# Patient Record
Sex: FEMALE | Race: BLACK OR AFRICAN AMERICAN | NOT HISPANIC OR LATINO | Employment: UNEMPLOYED | URBAN - METROPOLITAN AREA
[De-identification: names, ages, dates, MRNs, and addresses within clinical notes are randomized per-mention and may not be internally consistent; named-entity substitution may affect disease eponyms.]

---

## 2024-03-03 ENCOUNTER — CLINICAL SUPPORT (OUTPATIENT)
Dept: EMERGENCY MEDICINE | Facility: HOSPITAL | Age: 64
End: 2024-03-03
Payer: COMMERCIAL

## 2024-03-03 ENCOUNTER — HOSPITAL ENCOUNTER (OUTPATIENT)
Facility: HOSPITAL | Age: 64
Setting detail: OBSERVATION
Discharge: HOME | End: 2024-03-05
Attending: EMERGENCY MEDICINE | Admitting: PHYSICIAN ASSISTANT
Payer: COMMERCIAL

## 2024-03-03 ENCOUNTER — APPOINTMENT (OUTPATIENT)
Dept: RADIOLOGY | Facility: HOSPITAL | Age: 64
End: 2024-03-03
Payer: COMMERCIAL

## 2024-03-03 DIAGNOSIS — R06.02 SHORTNESS OF BREATH: Primary | ICD-10-CM

## 2024-03-03 DIAGNOSIS — R06.09 OTHER FORMS OF DYSPNEA: ICD-10-CM

## 2024-03-03 DIAGNOSIS — E87.79 OTHER HYPERVOLEMIA: ICD-10-CM

## 2024-03-03 PROBLEM — E87.70 FLUID EXCESS: Status: ACTIVE | Noted: 2024-03-03

## 2024-03-03 LAB
ABO GROUP (TYPE) IN BLOOD: NORMAL
ALBUMIN SERPL BCP-MCNC: 4.1 G/DL (ref 3.4–5)
ALP SERPL-CCNC: 116 U/L (ref 33–136)
ALT SERPL W P-5'-P-CCNC: 28 U/L (ref 7–45)
ANION GAP BLDV CALCULATED.4IONS-SCNC: 11 MMOL/L (ref 10–25)
ANION GAP SERPL CALC-SCNC: 14 MMOL/L (ref 10–20)
ANTIBODY SCREEN: NORMAL
AST SERPL W P-5'-P-CCNC: 16 U/L (ref 9–39)
ATRIAL RATE: 103 BPM
BASE EXCESS BLDV CALC-SCNC: 0.9 MMOL/L (ref -2–3)
BASOPHILS # BLD AUTO: 0.01 X10*3/UL (ref 0–0.1)
BASOPHILS NFR BLD AUTO: 0.1 %
BILIRUB SERPL-MCNC: 0.3 MG/DL (ref 0–1.2)
BNP SERPL-MCNC: 507 PG/ML (ref 0–99)
BODY TEMPERATURE: 37 DEGREES CELSIUS
BUN SERPL-MCNC: 16 MG/DL (ref 6–23)
CA-I BLDV-SCNC: 1.22 MMOL/L (ref 1.1–1.33)
CALCIUM SERPL-MCNC: 9.6 MG/DL (ref 8.6–10.6)
CARDIAC TROPONIN I PNL SERPL HS: 10 NG/L (ref 0–34)
CARDIAC TROPONIN I PNL SERPL HS: 10 NG/L (ref 0–34)
CHLORIDE BLDV-SCNC: 103 MMOL/L (ref 98–107)
CHLORIDE SERPL-SCNC: 104 MMOL/L (ref 98–107)
CO2 SERPL-SCNC: 25 MMOL/L (ref 21–32)
CREAT SERPL-MCNC: 0.95 MG/DL (ref 0.5–1.05)
D DIMER PPP FEU-MCNC: 421 NG/ML FEU
EGFRCR SERPLBLD CKD-EPI 2021: 68 ML/MIN/1.73M*2
EOSINOPHIL # BLD AUTO: 0 X10*3/UL (ref 0–0.7)
EOSINOPHIL NFR BLD AUTO: 0 %
ERYTHROCYTE [DISTWIDTH] IN BLOOD BY AUTOMATED COUNT: 15.3 % (ref 11.5–14.5)
FLUAV RNA RESP QL NAA+PROBE: NOT DETECTED
FLUBV RNA RESP QL NAA+PROBE: NOT DETECTED
GLUCOSE BLD MANUAL STRIP-MCNC: 166 MG/DL (ref 74–99)
GLUCOSE BLD MANUAL STRIP-MCNC: 223 MG/DL (ref 74–99)
GLUCOSE BLDV-MCNC: 284 MG/DL (ref 74–99)
GLUCOSE SERPL-MCNC: 259 MG/DL (ref 74–99)
HCO3 BLDV-SCNC: 26.6 MMOL/L (ref 22–26)
HCT VFR BLD AUTO: 35.8 % (ref 36–46)
HCT VFR BLD EST: 36 % (ref 36–46)
HGB BLD-MCNC: 11.8 G/DL (ref 12–16)
HGB BLDV-MCNC: 12.1 G/DL (ref 12–16)
IMM GRANULOCYTES # BLD AUTO: 0.1 X10*3/UL (ref 0–0.7)
IMM GRANULOCYTES NFR BLD AUTO: 1.3 % (ref 0–0.9)
INHALED O2 CONCENTRATION: 21 %
LACTATE BLDV-SCNC: 2 MMOL/L (ref 0.4–2)
LYMPHOCYTES # BLD AUTO: 1.11 X10*3/UL (ref 1.2–4.8)
LYMPHOCYTES NFR BLD AUTO: 14.4 %
MAGNESIUM SERPL-MCNC: 1.71 MG/DL (ref 1.6–2.4)
MCH RBC QN AUTO: 24.7 PG (ref 26–34)
MCHC RBC AUTO-ENTMCNC: 33 G/DL (ref 32–36)
MCV RBC AUTO: 75 FL (ref 80–100)
MONOCYTES # BLD AUTO: 0.26 X10*3/UL (ref 0.1–1)
MONOCYTES NFR BLD AUTO: 3.4 %
NEUTROPHILS # BLD AUTO: 6.22 X10*3/UL (ref 1.2–7.7)
NEUTROPHILS NFR BLD AUTO: 80.8 %
NRBC BLD-RTO: 0 /100 WBCS (ref 0–0)
OXYHGB MFR BLDV: 59.8 % (ref 45–75)
P AXIS: 77 DEGREES
P OFFSET: 196 MS
P ONSET: 139 MS
PCO2 BLDV: 46 MM HG (ref 41–51)
PH BLDV: 7.37 PH (ref 7.33–7.43)
PHOSPHATE SERPL-MCNC: 3.2 MG/DL (ref 2.5–4.9)
PLATELET # BLD AUTO: 317 X10*3/UL (ref 150–450)
PO2 BLDV: 42 MM HG (ref 35–45)
POTASSIUM BLDV-SCNC: 3.8 MMOL/L (ref 3.5–5.3)
POTASSIUM SERPL-SCNC: 3.7 MMOL/L (ref 3.5–5.3)
PR INTERVAL: 170 MS
PROT SERPL-MCNC: 7.2 G/DL (ref 6.4–8.2)
Q ONSET: 224 MS
QRS COUNT: 17 BEATS
QRS DURATION: 86 MS
QT INTERVAL: 360 MS
QTC CALCULATION(BAZETT): 471 MS
QTC FREDERICIA: 431 MS
R AXIS: 50 DEGREES
RBC # BLD AUTO: 4.78 X10*6/UL (ref 4–5.2)
RH FACTOR (ANTIGEN D): NORMAL
SAO2 % BLDV: 61 % (ref 45–75)
SARS-COV-2 RNA RESP QL NAA+PROBE: NOT DETECTED
SODIUM BLDV-SCNC: 137 MMOL/L (ref 136–145)
SODIUM SERPL-SCNC: 139 MMOL/L (ref 136–145)
T AXIS: 165 DEGREES
T OFFSET: 404 MS
TSH SERPL-ACNC: 0.6 MIU/L (ref 0.44–3.98)
VENTRICULAR RATE: 103 BPM
WBC # BLD AUTO: 7.7 X10*3/UL (ref 4.4–11.3)

## 2024-03-03 PROCEDURE — 99285 EMERGENCY DEPT VISIT HI MDM: CPT | Performed by: EMERGENCY MEDICINE

## 2024-03-03 PROCEDURE — 93010 ELECTROCARDIOGRAM REPORT: CPT | Performed by: EMERGENCY MEDICINE

## 2024-03-03 PROCEDURE — 84132 ASSAY OF SERUM POTASSIUM: CPT

## 2024-03-03 PROCEDURE — 84100 ASSAY OF PHOSPHORUS: CPT

## 2024-03-03 PROCEDURE — 93005 ELECTROCARDIOGRAM TRACING: CPT

## 2024-03-03 PROCEDURE — 84443 ASSAY THYROID STIM HORMONE: CPT

## 2024-03-03 PROCEDURE — 99285 EMERGENCY DEPT VISIT HI MDM: CPT | Mod: 25

## 2024-03-03 PROCEDURE — 71045 X-RAY EXAM CHEST 1 VIEW: CPT

## 2024-03-03 PROCEDURE — 2500000002 HC RX 250 W HCPCS SELF ADMINISTERED DRUGS (ALT 637 FOR MEDICARE OP, ALT 636 FOR OP/ED)

## 2024-03-03 PROCEDURE — 2500000001 HC RX 250 WO HCPCS SELF ADMINISTERED DRUGS (ALT 637 FOR MEDICARE OP)

## 2024-03-03 PROCEDURE — G0378 HOSPITAL OBSERVATION PER HR: HCPCS

## 2024-03-03 PROCEDURE — 86850 RBC ANTIBODY SCREEN: CPT

## 2024-03-03 PROCEDURE — 84484 ASSAY OF TROPONIN QUANT: CPT

## 2024-03-03 PROCEDURE — 2500000002 HC RX 250 W HCPCS SELF ADMINISTERED DRUGS (ALT 637 FOR MEDICARE OP, ALT 636 FOR OP/ED): Performed by: PHYSICIAN ASSISTANT

## 2024-03-03 PROCEDURE — 87636 SARSCOV2 & INF A&B AMP PRB: CPT

## 2024-03-03 PROCEDURE — 85025 COMPLETE CBC W/AUTO DIFF WBC: CPT

## 2024-03-03 PROCEDURE — 71045 X-RAY EXAM CHEST 1 VIEW: CPT | Performed by: RADIOLOGY

## 2024-03-03 PROCEDURE — 85379 FIBRIN DEGRADATION QUANT: CPT

## 2024-03-03 PROCEDURE — 83880 ASSAY OF NATRIURETIC PEPTIDE: CPT

## 2024-03-03 PROCEDURE — 36415 COLL VENOUS BLD VENIPUNCTURE: CPT

## 2024-03-03 PROCEDURE — 82947 ASSAY GLUCOSE BLOOD QUANT: CPT

## 2024-03-03 PROCEDURE — 83735 ASSAY OF MAGNESIUM: CPT

## 2024-03-03 RX ORDER — DEXTROSE MONOHYDRATE 100 MG/ML
0.3 INJECTION, SOLUTION INTRAVENOUS ONCE AS NEEDED
Status: DISCONTINUED | OUTPATIENT
Start: 2024-03-03 | End: 2024-03-05 | Stop reason: HOSPADM

## 2024-03-03 RX ORDER — METOPROLOL TARTRATE 50 MG/1
100 TABLET ORAL DAILY
Status: DISCONTINUED | OUTPATIENT
Start: 2024-03-03 | End: 2024-03-03

## 2024-03-03 RX ORDER — ZOLPIDEM TARTRATE 5 MG/1
10 TABLET ORAL ONCE
Status: COMPLETED | OUTPATIENT
Start: 2024-03-03 | End: 2024-03-03

## 2024-03-03 RX ORDER — HYDROCHLOROTHIAZIDE 25 MG/1
12.5 TABLET ORAL DAILY
Status: DISCONTINUED | OUTPATIENT
Start: 2024-03-03 | End: 2024-03-05 | Stop reason: HOSPADM

## 2024-03-03 RX ORDER — METOPROLOL TARTRATE 50 MG/1
50 TABLET ORAL ONCE
Status: COMPLETED | OUTPATIENT
Start: 2024-03-03 | End: 2024-03-03

## 2024-03-03 RX ORDER — INSULIN LISPRO 100 [IU]/ML
0-10 INJECTION, SOLUTION INTRAVENOUS; SUBCUTANEOUS
Status: DISCONTINUED | OUTPATIENT
Start: 2024-03-03 | End: 2024-03-05 | Stop reason: HOSPADM

## 2024-03-03 RX ORDER — DEXTROSE 50 % IN WATER (D50W) INTRAVENOUS SYRINGE
25
Status: DISCONTINUED | OUTPATIENT
Start: 2024-03-03 | End: 2024-03-05 | Stop reason: HOSPADM

## 2024-03-03 RX ADMIN — ZOLPIDEM TARTRATE 10 MG: 5 TABLET ORAL at 22:37

## 2024-03-03 RX ADMIN — METOPROLOL TARTRATE 50 MG: 50 TABLET, FILM COATED ORAL at 13:41

## 2024-03-03 RX ADMIN — HYDROCHLOROTHIAZIDE 12.5 MG: 25 TABLET ORAL at 13:38

## 2024-03-03 RX ADMIN — INSULIN LISPRO 4 UNITS: 100 INJECTION, SOLUTION INTRAVENOUS; SUBCUTANEOUS at 18:15

## 2024-03-03 ASSESSMENT — PAIN DESCRIPTION - LOCATION: LOCATION: BACK

## 2024-03-03 ASSESSMENT — LIFESTYLE VARIABLES
EVER HAD A DRINK FIRST THING IN THE MORNING TO STEADY YOUR NERVES TO GET RID OF A HANGOVER: NO
EVER FELT BAD OR GUILTY ABOUT YOUR DRINKING: NO
HAVE PEOPLE ANNOYED YOU BY CRITICIZING YOUR DRINKING: NO
HAVE YOU EVER FELT YOU SHOULD CUT DOWN ON YOUR DRINKING: NO

## 2024-03-03 ASSESSMENT — PAIN SCALES - GENERAL: PAINLEVEL_OUTOF10: 7

## 2024-03-03 ASSESSMENT — COLUMBIA-SUICIDE SEVERITY RATING SCALE - C-SSRS
1. IN THE PAST MONTH, HAVE YOU WISHED YOU WERE DEAD OR WISHED YOU COULD GO TO SLEEP AND NOT WAKE UP?: NO
6. HAVE YOU EVER DONE ANYTHING, STARTED TO DO ANYTHING, OR PREPARED TO DO ANYTHING TO END YOUR LIFE?: NO
2. HAVE YOU ACTUALLY HAD ANY THOUGHTS OF KILLING YOURSELF?: NO

## 2024-03-03 ASSESSMENT — PAIN - FUNCTIONAL ASSESSMENT: PAIN_FUNCTIONAL_ASSESSMENT: 0-10

## 2024-03-03 NOTE — DISCHARGE INSTRUCTIONS
Your tests were concerning for heart failure.  Please monitor and control your salt intake.  You should expect a call in the next couple days to get scheduled to see a heart doctor (cardiology).  If you do not hear by this Thursday, please call the following number to get scheduled: [phone]

## 2024-03-03 NOTE — ED PROVIDER NOTES
HPI:      Limitations to History: none  Additional History Obtained from: None   External records reviewed: Prior EMR notes    Chief Complaint   Patient presents with    Shortness of Breath          Ela Shankar is a 63 y.o. female with past medical history of HTN, T2DM, HLD presenting to the ED with shortness of breath over the last week.  Over the last days she has become more short of breath particularly on exertion, noting that she is having difficulty moving around, and is unable to lie flat at night.  Notes that she typically takes her blood pressure medications, however today she woke up, found herself very short of breath, and presented to the emergency department prior to taking her blood pressure medication.  On arrival was 195/98, given metoprolol which he takes at home.  She also notes mild chest pain, but is more concerned about her shortness of breath.  She denies any fevers, chills, nausea, vomiting at home.    Past Medical History:   Diagnosis Date    Diabetes mellitus (CMS/[facility])     Hyperlipidemia     Hypertension      History reviewed. No pertinent surgical history.  Social History     Tobacco Use    Smoking status: Former     Types: Cigarettes    Smokeless tobacco: Never     Allergies   Allergen Reactions    Lisinopril Cough   --------------------------------------------------------------------------------------------------------------------------------------    VS: As documented in the triage note and EMR flowsheet from this visit were reviewed.  Temp 36.6 °C (97.9 °F) HR (!) 105 BP (!) 195/98 RR (!) 22 Sat 97 % on      Physical Exam:  GEN:  no acute distress, appears comfortable. Conversational and appropriate.    HEENT: Normocephalic, atraumatic. Conjunctiva pink with no redness or exudates. Hearing grossly intact. Moist mucous membranes.  CARDIO: Tachycardic rate and regular rhythm. Normal S1, S2  without murmurs, rubs, or gallops.   PULM: Clear to auscultation bilaterally however tachypneic.  no rales, rhonchi, or wheezes. No accessory muscle use or stridor. Speaking in full sentences.  GI: Soft, non-tender, non-distended. No rebound tenderness or guarding.   SKIN: Warm and dry, no rashes, lesions, petechiae, or purpura.  MSK: ROM intact in all 4 extremities without contractures or pain. No peripheral edema, contusions, or wounds.    NEURO: A&Ox3, No focal findings identified. No confusion or gross mental status changes.  PSYCH: Appropriate mood and behavior, converses and responds appropriately during exam.        ---------------------------------------------------------------------------------------------------------------------------------------  Given in the ED:   Medications   metoprolol tartrate (Lopressor) tablet 50 mg (50 mg oral Given 3/3/24 1341)   zolpidem (Ambien) tablet 10 mg (10 mg oral Given 3/3/24 2237)   perflutren lipid microspheres (Definity) injection 1 mL of dilution (4 mL of dilution intravenous Given 3/4/24 1224)        Work up: All labs and imaging were independently reviewed by me.    Labs Reviewed   CBC WITH AUTO DIFFERENTIAL - Abnormal       Result Value    WBC 7.7      nRBC 0.0      RBC 4.78      Hemoglobin 11.8 (*)     Hematocrit 35.8 (*)     MCV 75 (*)     MCH 24.7 (*)     MCHC 33.0      RDW 15.3 (*)     Platelets 317      Neutrophils % 80.8      Immature Granulocytes %, Automated 1.3 (*)     Lymphocytes % 14.4      Monocytes % 3.4      Eosinophils % 0.0      Basophils % 0.1      Neutrophils Absolute 6.22      Immature Granulocytes Absolute, Automated 0.10      Lymphocytes Absolute 1.11 (*)     Monocytes Absolute 0.26      Eosinophils Absolute 0.00      Basophils Absolute 0.01     COMPREHENSIVE METABOLIC PANEL - Abnormal    Glucose 259 (*)     Sodium 139      Potassium 3.7      Chloride 104      Bicarbonate 25      Anion Gap 14      Urea Nitrogen 16      Creatinine 0.95      eGFR 68      Calcium 9.6      Albumin 4.1      Alkaline Phosphatase 116      Total Protein 7.2       AST 16      Bilirubin, Total 0.3      ALT 28     B-TYPE NATRIURETIC PEPTIDE - Abnormal     (*)     Narrative:        <100 pg/mL - Heart failure unlikely  100-299 pg/mL - Intermediate probability of acute heart                  failure exacerbation. Correlate with clinical                  context and patient history.    >=300 pg/mL - Heart Failure likely. Correlate with clinical                  context and patient history.     Biotin interference may cause falsely decreased results. Patients taking a Biotin dose of up to 5 mg/day should refrain from taking Biotin for 24 hours before sample  collection. Providers may contact their local laboratory for further information.   BLOOD GAS VENOUS FULL PANEL - Abnormal    POCT pH, Venous 7.37      POCT pCO2, Venous 46      POCT pO2, Venous 42      POCT SO2, Venous 61      POCT Oxy Hemoglobin, Venous 59.8      POCT Hematocrit Calculated, Venous 36.0      POCT Sodium, Venous 137      POCT Potassium, Venous 3.8      POCT Chloride, Venous 103      POCT Ionized Calicum, Venous 1.22      POCT Glucose, Venous 284 (*)     POCT Lactate, Venous 2.0      POCT Base Excess, Venous 0.9      POCT HCO3 Calculated, Venous 26.6 (*)     POCT Hemoglobin, Venous 12.1      POCT Anion Gap, Venous 11.0      Patient Temperature 37.0      FiO2 21     B-TYPE NATRIURETIC PEPTIDE - Abnormal     (*)     Narrative:        <100 pg/mL - Heart failure unlikely  100-299 pg/mL - Intermediate probability of acute heart                  failure exacerbation. Correlate with clinical                  context and patient history.    >=300 pg/mL - Heart Failure likely. Correlate with clinical                  context and patient history.     Biotin interference may cause falsely decreased results. Patients taking a Biotin dose of up to 5 mg/day should refrain from taking Biotin for 24 hours before sample  collection. Providers may contact their local laboratory for further information.   POCT GLUCOSE  - Abnormal    POCT Glucose 223 (*)    POCT GLUCOSE - Abnormal    POCT Glucose 166 (*)    POCT GLUCOSE - Abnormal    POCT Glucose 164 (*)    POCT GLUCOSE - Abnormal    POCT Glucose 133 (*)    POCT GLUCOSE - Abnormal    POCT Glucose 199 (*)    POCT GLUCOSE - Abnormal    POCT Glucose 124 (*)    POCT GLUCOSE - Abnormal    POCT Glucose 244 (*)    MAGNESIUM - Normal    Magnesium 1.71     SERIAL TROPONIN-INITIAL - Normal    Troponin I, High Sensitivity 10      Narrative:     Less than 99th percentile of normal range cutoff-  Female and children under 18 years old <35 ng/L; Male <54 ng/L: Negative  Repeat testing should be performed if clinically indicated.     Female and children under 18 years old  ng/L; Male  ng/L:  Consistent with possible cardiac damage and possible increased clinical   risk. Serial measurements may help to assess extent of myocardial damage.     >120 ng/L: Consistent with cardiac damage, increased clinical risk and  myocardial infarction. Serial measurements may help assess extent of   myocardial damage.      NOTE: Children less than 1 year old may have higher baseline troponin   levels and results should be interpreted in conjunction with the overall   clinical context.    NOTE: Troponin I testing is performed using a different   testing methodology at [facility] than at other   [facility]. Direct result comparisons should only   be made within the same method.     TSH WITH REFLEX TO FREE T4 IF ABNORMAL - Normal    Thyroid Stimulating Hormone 0.60      Narrative:     TSH testing is performed using different testing methodology at [facility] than at other [facility]. Direct result comparisons should only be made within the same method.     PHOSPHORUS - Normal    Phosphorus 3.2     SERIAL TROPONIN, 1 HOUR - Normal    Troponin I, High Sensitivity 10      Narrative:     Less than 99th percentile of normal range cutoff-  Female and children under 18  years old <35 ng/L; Male <54 ng/L: Negative  Repeat testing should be performed if clinically indicated.     Female and children under 18 years old  ng/L; Male  ng/L:  Consistent with possible cardiac damage and possible increased clinical   risk. Serial measurements may help to assess extent of myocardial damage.     >120 ng/L: Consistent with cardiac damage, increased clinical risk and  myocardial infarction. Serial measurements may help assess extent of   myocardial damage.      NOTE: Children less than 1 year old may have higher baseline troponin   levels and results should be interpreted in conjunction with the overall   clinical context.    NOTE: Troponin I testing is performed using a different   testing methodology at [facility]] than at other   [facility]. Direct result comparisons should only   be made within the same method.     D-DIMER, NON VTE - Normal    D-Dimer Non VTE, Quant (ng/mL FEU) 421      Narrative:     The D-Dimer assay is reported in ng/mL Fibrinogen Equivalent Units (FEU). The results of this assay should NOT be used for the exclusion of Deep Vein Thrombosis and/or Pulmonary Embolism.   SARS-COV-2 AND INFLUENZA A/B PCR - Normal    Flu A Result Not Detected      Flu B Result Not Detected      Coronavirus 2019, PCR Not Detected      Narrative:     This assay has received FDA Emergency Use Authorization (EUA) and  is only authorized for the duration of time that circumstances exist to justify the authorization of the emergency use of in vitro diagnostic tests for the detection of SARS-CoV-2 virus and/or diagnosis of COVID-19 infection under section 564(b)(1) of the Act, 21 U.S.C. 360bbb-3(b)(1). Testing for SARS-CoV-2 is only recommended for patients who meet current clinical and/or epidemiological criteria as defined by federal, state, or local public health directives. This assay is an in vitro diagnostic nucleic acid amplification test for the qualitative  detection of SARS-CoV-2, Influenza A, and Influenza B from nasopharyngeal specimens and has been validated for use at [facility]. Negative results do not preclude COVID-19 infections or Influenza A/B infections, and should not be used as the sole basis for diagnosis, treatment, or other management decisions. If Influenza A/B and RSV PCR results are negative, testing for Parainfluenza virus, Adenovirus and Metapneumovirus is routinely performed for [facility] [place] [place] in[place], and is available on other patients by placing an add-on request.    TROPONIN SERIES- (INITIAL, 1 HR)    Narrative:     The following orders were created for panel order Troponin I Series, High Sensitivity (0, 1 HR).  Procedure                               Abnormality         Status                     ---------                               -----------         ------                     Troponin I, High Sensiti...[[id number]]  Normal              Final result               Troponin, High Sensitivi...[[id number]]  Normal              Final result                 Please view results for these tests on the individual orders.   TYPE AND SCREEN    ABO TYPE B      Rh TYPE POS      ANTIBODY SCREEN NEG     POCT GLUCOSE METER   POCT GLUCOSE METER   POCT GLUCOSE METER   POCT GLUCOSE METER   POCT GLUCOSE METER   POCT GLUCOSE METER   POCT GLUCOSE METER   POCT GLUCOSE METER       Echocardiogram stress test   Final Result      XR chest 1 view   Final Result   Enlarged cardiac silhouette. No evidence of acute cardiopulmonary   process.             MACRO:   None        Signed by: Christoph Villalobos 3/3/2024 12:11 PM   Dictation workstation:   [id number]          ED Course as of 03/05/24 1717   Sun Mar 03, 2024   1406 ED Attending Documentation: This patient was seen by the resident physician.  I have seen and examined the patient, agree with the workup, evaluation, management and diagnosis. I reviewed and edited  the above documentation where necessary. I have looked at the EKG and agree with the interpretation. On my evaluation of the patient: 63-year-old female who presents the emergency department with hypertension, slight tachycardia and exertional shortness of breath.  The patient says that she has had a week of shortness with exertion, she never sleeps on her back so this is unchanged from previous.  She denies any leg swelling.  At rest she does not feel like she is getting a full breath but does not feel short of breath.  She denies any chest pain.  Exam is unremarkable with the exception of some diminished breath sounds bilaterally, patient's lab workup so far reveals a BNP of 500 but otherwise she is not on oxygen, she has no respiratory distress, her troponin is negative.  She does have EKG changes that are concerning that this may be an ischemic equivalent the patient will be placed in CDU for further workup.    Ted Clement MD   Attending Physician   [MV]   Mon Mar 04, 2024   1754 Echocardiogram stress test [BH]      ED Course User Index  [BH] Mario Perez PA-C  [MV] Milady Kilgore PA-C         Diagnoses as of 03/05/24 1717   Shortness of breath   Other hypervolemia       MDM:  Briefly, this is a 63 y.o. female with past medical history of HTN, T2DM, HLD presenting to the ED with shortness of breath over the last week, concerning for heart failure versus cardiac chest pain.  Patient's lab work was remarkable for an elevated BNP, however not clinically fluid overloaded.  Chest x-ray did not show any obvious signs of pneumonia.  EKG did not show any acute signs of STEMI, however EKG sinus sinus tachycardia at 103 bpm, with T wave inversions in 3, aVR, aVF, and V1, in addition to flattening in AVL.  No prior EKG to compare.  Patient noted that she did not have any chest pain, and blood pressure was notably improved after metoprolol dose.  However, given her tachycardia and notable shortness of breath,  a  D-dimer was performed and was unremarkable, therefore a CT PE was not performed.  Given patient's prior history, discussed admission to CDU for further cardiac stress testing, which she was agreeable to.  Remained hemodynamically stable and without chest pain in the ED.      Impression:   1. Shortness of breath    2. Other hypervolemia    3. Other forms of dyspnea      Plan and Disposition: Admission to CDU for observation and stress test in [facility]      Dale Taylor DO  Emergency Medicine, PGY-2    Patient was seen and evaluated by the attending physician. The attending ED physician agrees with the plan. Patient and/or patient´s representative was counseled regarding labs, imaging, likely diagnosis, and plan. All questions were answered.  Disclaimer: This note was dictated by speech recognition.  Attempt at proofreading was made to minimize errors.  Errors in transcription may be present.  Please call if questions.     Dale Taylor DO  Resident  03/05/24 5096

## 2024-03-03 NOTE — ED PROVIDER NOTES
History and Physical  [facility] CLINICAL DECISION  Patient: Ela Shankar  MRN: [id number]  : 1960  Date of Evaluation: 3/3/2024  ED Provider: Mario Perez PA-C      Limitations to history: None  Independent Historian: Yes  External Records Reviewed: Recent and relevant inpatient and outpatient notes and EMR      Patient History:  Ela Shankar is a 63 y.o. female with a past medical history of hypertension, DM 2, HLD and bilateral cataracts who is admitted to the Clinical Decision Unit for shortness of breath.  Patient reported increasing shortness of breath over the past week.  She states that she has been unable to walk up a flight of stairs without stopping to rest which is not her norm.  Patient also states that she have to prop herself up on pillows to sleep in the evenings, as she cannot lie flat in bed without becoming short of breath.  She reports cough productive of clear sputum.  She states that her legs are mildly swollen.  She denies chest pain, palpitations or discomfort.  Additionally she has no headache syncope dizziness nasal congestion rhinorrhea dysphagia nausea vomiting fever chills abdominal pain urinary symptoms diarrhea constipation or any recent illness.  Patient states she stopped smoking cigarettes 3 years ago.    The acute evaluation included:  Orders Placed This Encounter   Procedures    XR chest 1 view    Point of Care Ultrasound    Troponin I Series, High Sensitivity (0, 1 HR)    CBC and Auto Differential    Comprehensive Metabolic Panel    Magnesium    B-type natriuretic peptide    Troponin I, High Sensitivity, Initial    BLOOD GAS VENOUS FULL PANEL    TSH with reflex to Free T4 if abnormal    Phosphorus    Type And Screen    Troponin, High Sensitivity, 1 Hour    D-dimer, quantitative    Sars-CoV-2 and Influenza A/B PCR    Adult diet 2-3 grams sodium    Vital Signs    Cardiac Monitoring - ED/ICU/PACU Only    ECG 12 lead    ECG 12 lead    ECG 12 lead    Insert  and maintain peripheral IV    Send to CDU    Initiate observation status       I reviewed the below labs and imaging as ordered by the ED provider:  XR chest 1 view   Final Result   Enlarged cardiac silhouette. No evidence of acute cardiopulmonary   process.             MACRO:   None        Signed by: Christoph Villalobos 3/3/2024 12:11 PM   Dictation workstation:   [id number]      Point of Care Ultrasound    (Results Pending)       Labs Reviewed   CBC WITH AUTO DIFFERENTIAL - Abnormal       Result Value    WBC 7.7      nRBC 0.0      RBC 4.78      Hemoglobin 11.8 (*)     Hematocrit 35.8 (*)     MCV 75 (*)     MCH 24.7 (*)     MCHC 33.0      RDW 15.3 (*)     Platelets 317      Neutrophils % 80.8      Immature Granulocytes %, Automated 1.3 (*)     Lymphocytes % 14.4      Monocytes % 3.4      Eosinophils % 0.0      Basophils % 0.1      Neutrophils Absolute 6.22      Immature Granulocytes Absolute, Automated 0.10      Lymphocytes Absolute 1.11 (*)     Monocytes Absolute 0.26      Eosinophils Absolute 0.00      Basophils Absolute 0.01     COMPREHENSIVE METABOLIC PANEL - Abnormal    Glucose 259 (*)     Sodium 139      Potassium 3.7      Chloride 104      Bicarbonate 25      Anion Gap 14      Urea Nitrogen 16      Creatinine 0.95      eGFR 68      Calcium 9.6      Albumin 4.1      Alkaline Phosphatase 116      Total Protein 7.2      AST 16      Bilirubin, Total 0.3      ALT 28     B-TYPE NATRIURETIC PEPTIDE - Abnormal     (*)     Narrative:        <100 pg/mL - Heart failure unlikely  100-299 pg/mL - Intermediate probability of acute heart                  failure exacerbation. Correlate with clinical                  context and patient history.    >=300 pg/mL - Heart Failure likely. Correlate with clinical                  context and patient history.     Biotin interference may cause falsely decreased results. Patients taking a Biotin dose of up to 5 mg/day should refrain from taking Biotin for 24 hours before sample   collection. Providers may contact their local laboratory for further information.   BLOOD GAS VENOUS FULL PANEL - Abnormal    POCT pH, Venous 7.37      POCT pCO2, Venous 46      POCT pO2, Venous 42      POCT SO2, Venous 61      POCT Oxy Hemoglobin, Venous 59.8      POCT Hematocrit Calculated, Venous 36.0      POCT Sodium, Venous 137      POCT Potassium, Venous 3.8      POCT Chloride, Venous 103      POCT Ionized Calicum, Venous 1.22      POCT Glucose, Venous 284 (*)     POCT Lactate, Venous 2.0      POCT Base Excess, Venous 0.9      POCT HCO3 Calculated, Venous 26.6 (*)     POCT Hemoglobin, Venous 12.1      POCT Anion Gap, Venous 11.0      Patient Temperature 37.0      FiO2 21     MAGNESIUM - Normal    Magnesium 1.71     SERIAL TROPONIN-INITIAL - Normal    Troponin I, High Sensitivity 10      Narrative:     Less than 99th percentile of normal range cutoff-  Female and children under 18 years old <35 ng/L; Male <54 ng/L: Negative  Repeat testing should be performed if clinically indicated.     Female and children under 18 years old  ng/L; Male  ng/L:  Consistent with possible cardiac damage and possible increased clinical   risk. Serial measurements may help to assess extent of myocardial damage.     >120 ng/L: Consistent with cardiac damage, increased clinical risk and  myocardial infarction. Serial measurements may help assess extent of   myocardial damage.      NOTE: Children less than 1 year old may have higher baseline troponin   levels and results should be interpreted in conjunction with the overall   clinical context.    NOTE: Troponin I testing is performed using a different   testing methodology at [facility] than at other   [facility]. Direct result comparisons should only   be made within the same method.     TSH WITH REFLEX TO FREE T4 IF ABNORMAL - Normal    Thyroid Stimulating Hormone 0.60      Narrative:     TSH testing is performed using different testing methodology  at [facility] than at other [facility]. Direct result comparisons should only be made within the same method.     PHOSPHORUS - Normal    Phosphorus 3.2     SERIAL TROPONIN, 1 HOUR - Normal    Troponin I, High Sensitivity 10      Narrative:     Less than 99th percentile of normal range cutoff-  Female and children under 18 years old <35 ng/L; Male <54 ng/L: Negative  Repeat testing should be performed if clinically indicated.     Female and children under 18 years old  ng/L; Male  ng/L:  Consistent with possible cardiac damage and possible increased clinical   risk. Serial measurements may help to assess extent of myocardial damage.     >120 ng/L: Consistent with cardiac damage, increased clinical risk and  myocardial infarction. Serial measurements may help assess extent of   myocardial damage.      NOTE: Children less than 1 year old may have higher baseline troponin   levels and results should be interpreted in conjunction with the overall   clinical context.    NOTE: Troponin I testing is performed using a different   testing methodology at [facility] than at other   [facility]. Direct result comparisons should only   be made within the same method.     D-DIMER, NON VTE - Normal    D-Dimer Non VTE, Quant (ng/mL FEU) 421      Narrative:     The D-Dimer assay is reported in ng/mL Fibrinogen Equivalent Units (FEU). The results of this assay should NOT be used for the exclusion of Deep Vein Thrombosis and/or Pulmonary Embolism.   SARS-COV-2 AND INFLUENZA A/B PCR - Normal    Flu A Result Not Detected      Flu B Result Not Detected      Coronavirus 2019, PCR Not Detected      Narrative:     This assay has received FDA Emergency Use Authorization (EUA) and  is only authorized for the duration of time that circumstances exist to justify the authorization of the emergency use of in vitro diagnostic tests for the detection of SARS-CoV-2 virus and/or diagnosis of COVID-19  infection under section 564(b)(1) of the Act, 21 U.S.C. 360bbb-3(b)(1). Testing for SARS-CoV-2 is only recommended for patients who meet current clinical and/or epidemiological criteria as defined by federal, state, or local public health directives. This assay is an in vitro diagnostic nucleic acid amplification test for the qualitative detection of SARS-CoV-2, Influenza A, and Influenza B from nasopharyngeal specimens and has been validated for use at [facility]. Negative results do not preclude COVID-19 infections or Influenza A/B infections, and should not be used as the sole basis for diagnosis, treatment, or other management decisions. If Influenza A/B and RSV PCR results are negative, testing for Parainfluenza virus, Adenovirus and Metapneumovirus is routinely performed for [facility] pediatric oncology and intensive care inpatients, and is available on other patients by placing an add-on request.    TROPONIN SERIES- (INITIAL, 1 HR)    Narrative:     The following orders were created for panel order Troponin I Series, High Sensitivity (0, 1 HR).  Procedure                               Abnormality         Status                     ---------                               -----------         ------                     Troponin I, High Sensiti...[[id number]]  Normal              Final result               Troponin, High Sensitivi...[[id number]]  Normal              Final result                 Please view results for these tests on the individual orders.   TYPE AND SCREEN    ABO TYPE B      Rh TYPE POS      ANTIBODY SCREEN NEG             After discussion with the ED provider, a decision was made to admit the patient to the Clinical Decision Unit.    Upon admission to the Clinical Decision Unit, Mrs. Shankar is alert and oriented x 4 and appears in no acute distress.  Medical workup initiated in the emergency department included EKG, laboratory studies and chest x-ray.  Diagnostic information was  "obtained, reviewed and discussed with the patient.  EKG was nonischemic and without arrhythmia.  Laboratory studies were notable for elevated BNP of 507.  High-sensitivity serum troponin of 10, VTE D-dimer of 421, TSH within normal limits at 0.60 and negative influenza and COVID PCR.  Chest x-ray shows enlarged cardiac silhouette.  Patient is not hypoxic and appears to be resting comfortably.  She was placed into the clinical decision unit for close observation, continuous telemetry and stress echocardiogram.    Past History     Past Medical History:   Diagnosis Date    Diabetes mellitus (CMS/[facility])     Hyperlipidemia     Hypertension      History reviewed. No pertinent surgical history.  Social History     Socioeconomic History    Marital status: Single     Spouse name: None    Number of children: None    Years of education: None    Highest education level: None   Occupational History    None   Tobacco Use    Smoking status: Former     Types: Cigarettes    Smokeless tobacco: Never   Substance and Sexual Activity    Alcohol use: None    Drug use: None    Sexual activity: None   Other Topics Concern    None   Social History Narrative    None     Social Determinants of Health     Financial Resource Strain: Not on file   Food Insecurity: Not on file   Transportation Needs: Not on file   Physical Activity: Not on file   Stress: Not on file   Social Connections: Not on file   Intimate Partner Violence: Not on file   Housing Stability: Not on file         Medications/Allergies     Previous Medications    No medications on file     Allergies   Allergen Reactions    Lisinopril Cough         Review of Systems  All systems reviewed and otherwise negative, except as stated above in HPI.      Physical Exam     Visit Vitals  /75   Pulse 97   Temp 36.6 °C (97.9 °F)   Resp 12   Ht 1.575 m (5' 2\")   Wt 117 kg (258 lb)   SpO2 97%   BMI 47.19 kg/m²   Smoking Status Former   BSA 2.26 m²         Physical exam    VS: As documented " in the triage note and EMR flowsheet from this visit were reviewed.    General: Patient is AAOx3, obese, well nourished, is a good historian, answers questions appropriately    HEENT: head normocephalic, atraumatic, PERRLA, EOMs intact, oropharynx without erythema or exudate, buccal mucosa intact without lesions, nose is patent bilateral    Neck: supple, full ROM, negative for lymphadenopathy, JVD, thyromegaly, tracheal deviation, nuchal rigidity    Pulmonary: Clear to auscultation bilaterally, No wheezing, rales, or rhonchi, no accessory muscle use, able to speak full clear sentences    Cardiac: Normal rate and rhythm, no murmurs, rubs or gallops    GI: soft, non-tender, non-distended, normoactive bowelsounds in all four quadrants, no masses or organomegaly, no guarding or CVA tenderness noted    Musculoskeletal: full weight bearing, ROUSSEAU, no joint effusions, clubbing. 1+ bilateral pedal edema noted    Skin: Warm, dry, intact, no lesions or rashes noted, turgor is good.    Neuro: patient follow commands, cranial nerves 2-12 grossly intact, motor strengths 5/5 upper and lower extremities, sensation are symmetrical. No focal deficits.    Psych: Appropriate mood and affect for situation      Consultants  1) N/A      Impression and Plan  In summary, Ela Shankar is admitted to the [facility] Clinical Decision Unit for dyspnea. Dr. Clement is the CDU admission attending.    This patient has been risk-stratified based on available history, physical exam, and related study findings. Admission to the observation status for further diagnosis/treatment/monitoring of dyspnea is warranted clinically. This extended period of observation is specifically required to determine the need for hospitalization.     The goals of this admission based on the patient’s clinical problem list are:  1) Stable vital signs  2) Cardiac medical workup    Assessment/ Plan  1) Dyspnea  -Continuous telemetry  -Stress  echocardiogram in the a.m.    2) Pedal edema  -Continuous telemetry  -Stress echocardiogram in the a.m.    When met, appropriate disposition will be arranged.        Maroi Perez PA-C  03/03/24 [id number]       Mario Perez PA-C  03/03/24 1604

## 2024-03-04 ENCOUNTER — APPOINTMENT (OUTPATIENT)
Dept: CARDIOLOGY | Facility: HOSPITAL | Age: 64
End: 2024-03-04
Payer: COMMERCIAL

## 2024-03-04 DIAGNOSIS — R06.00 DYSPNEA, UNSPECIFIED TYPE: Primary | ICD-10-CM

## 2024-03-04 PROBLEM — F41.1 GAD (GENERALIZED ANXIETY DISORDER): Status: ACTIVE | Noted: 2020-09-24

## 2024-03-04 LAB
GLUCOSE BLD MANUAL STRIP-MCNC: 133 MG/DL (ref 74–99)
GLUCOSE BLD MANUAL STRIP-MCNC: 164 MG/DL (ref 74–99)
GLUCOSE BLD MANUAL STRIP-MCNC: 199 MG/DL (ref 74–99)

## 2024-03-04 PROCEDURE — 82947 ASSAY GLUCOSE BLOOD QUANT: CPT

## 2024-03-04 PROCEDURE — 2500000004 HC RX 250 GENERAL PHARMACY W/ HCPCS (ALT 636 FOR OP/ED): Performed by: PHYSICIAN ASSISTANT

## 2024-03-04 PROCEDURE — 2500000002 HC RX 250 W HCPCS SELF ADMINISTERED DRUGS (ALT 637 FOR MEDICARE OP, ALT 636 FOR OP/ED)

## 2024-03-04 PROCEDURE — 93018 CV STRESS TEST I&R ONLY: CPT | Performed by: INTERNAL MEDICINE

## 2024-03-04 PROCEDURE — 93016 CV STRESS TEST SUPVJ ONLY: CPT | Performed by: INTERNAL MEDICINE

## 2024-03-04 PROCEDURE — 93350 STRESS TTE ONLY: CPT | Performed by: INTERNAL MEDICINE

## 2024-03-04 PROCEDURE — G0378 HOSPITAL OBSERVATION PER HR: HCPCS

## 2024-03-04 PROCEDURE — 93350 STRESS TTE ONLY: CPT

## 2024-03-04 PROCEDURE — 2500000001 HC RX 250 WO HCPCS SELF ADMINISTERED DRUGS (ALT 637 FOR MEDICARE OP): Performed by: PHYSICIAN ASSISTANT

## 2024-03-04 RX ORDER — ATORVASTATIN CALCIUM 80 MG/1
80 TABLET, FILM COATED ORAL DAILY
COMMUNITY

## 2024-03-04 RX ORDER — TIRZEPATIDE 10 MG/.5ML
0.5 INJECTION, SOLUTION SUBCUTANEOUS
COMMUNITY
Start: 2024-02-01 | End: 2025-01-31

## 2024-03-04 RX ORDER — TOPIRAMATE 50 MG/1
1 TABLET, FILM COATED ORAL 2 TIMES DAILY
COMMUNITY
Start: 2023-10-12

## 2024-03-04 RX ORDER — LANCETS 33 GAUGE
EACH MISCELLANEOUS
COMMUNITY
Start: 2024-02-28

## 2024-03-04 RX ORDER — LOSARTAN POTASSIUM 25 MG/1
1 TABLET ORAL DAILY
COMMUNITY
Start: 2022-12-22

## 2024-03-04 RX ORDER — ATROPINE SULFATE 0.4 MG/ML
.25-2 INJECTION, SOLUTION ENDOTRACHEAL; INTRAMEDULLARY; INTRAMUSCULAR; INTRAVENOUS; SUBCUTANEOUS
OUTPATIENT
Start: 2024-03-04

## 2024-03-04 RX ORDER — ZOLPIDEM TARTRATE 10 MG/1
1 TABLET ORAL NIGHTLY PRN
COMMUNITY
Start: 2014-04-15 | End: 2024-04-09 | Stop reason: ALTCHOICE

## 2024-03-04 RX ORDER — DOBUTAMINE HYDROCHLORIDE 100 MG/100ML
5-40 INJECTION INTRAVENOUS CONTINUOUS
OUTPATIENT
Start: 2024-03-04

## 2024-03-04 RX ORDER — BACLOFEN 20 MG/1
20 TABLET ORAL 3 TIMES DAILY
COMMUNITY
Start: 2023-10-24

## 2024-03-04 RX ORDER — ZOLPIDEM TARTRATE 5 MG/1
10 TABLET ORAL NIGHTLY PRN
Status: DISCONTINUED | OUTPATIENT
Start: 2024-03-04 | End: 2024-03-05 | Stop reason: HOSPADM

## 2024-03-04 RX ORDER — METHOCARBAMOL 750 MG/1
TABLET, FILM COATED ORAL
COMMUNITY
Start: 2023-09-10 | End: 2024-02-01 | Stop reason: WASHOUT

## 2024-03-04 RX ORDER — LANCETS 30 GAUGE
EACH MISCELLANEOUS
COMMUNITY
Start: 2023-12-07

## 2024-03-04 RX ADMIN — HYDROCHLOROTHIAZIDE 12.5 MG: 25 TABLET ORAL at 14:51

## 2024-03-04 RX ADMIN — PERFLUTREN 4 ML OF DILUTION: 6.52 INJECTION, SUSPENSION INTRAVENOUS at 12:24

## 2024-03-04 RX ADMIN — ZOLPIDEM TARTRATE 10 MG: 5 TABLET ORAL at 23:13

## 2024-03-04 NOTE — PROGRESS NOTES
Verona Shankar is a 63 y.o. female on day 1 of admission presenting with Fluid excess.   Serial assessments of clinical progress include:  1.)  Patient still reports shortness of breath, denies chest pain, no wheezing, crackles or rales.  2.)  Patient aware of n.p.o. status.  3.)  Patient remained hemodynamically stable and neurologically intact on the rest of the night.      Objective  VS reviewed  Physical Exam:  GENERAL:  The patient appears nourished and normally developed. Vital signs as documented.     Appearance: Alert, oriented, cooperative, in no acute distress. Well nourished & well hydrated.    HEENT:  Head normocephalic, atraumatic, EOMs intact, PERRLA, Mucous membranes moist. Nares patent without copious rhinorrhea.  Oropharynx moist and clear.  Uvula midline.    Neck: Supple.  No meningismus.  No swelling.  Trachea midline. No lymphadenopathy.    Pulmonary:  Lungs are clear to auscultation, without any respiratory distress.  No wheezing, crackles or rales.  No hypoxia or dyspnea.  Able to speak full sentences, no accessory muscle use.    Cardia:   Regular rate and rhythm. No murmurs, rubs or gallops.  No JVD.    GI:  Soft, non-distended, non-tender, BS positive x 4 quadrants, No rebound or guarding, no peritoneal signs, no CVA tenderness, no masses or organomegaly.    Musculoskeletal: Symmetrical muscle bulk.  No peripheral edema.  Pulses intact distal.  Able to walk.    Integumentary: Warm, dry and intact.  No pallor or jaundice.  No lesions, rashes or open sores.    NEURO:  No obvious neurological deficits, normal sensation and strength bilaterally.  Speech clear and fluent.  Able to follow commands, CN 2-12 intact.    Psych: Appropriate mood and affect.      Relevant Results  Results for orders placed or performed during the hospital encounter of 03/03/24 (from the past 24 hour(s))   ECG 12 lead   Result Value Ref Range    Ventricular Rate 103 BPM    Atrial Rate 103 BPM    MA Interval 170  ms    QRS Duration 86 ms    QT Interval 360 ms    QTC Calculation(Bazett) 471 ms    P Axis 77 degrees    R Axis 50 degrees    T Axis 165 degrees    QRS Count 17 beats    Q Onset 224 ms    P Onset 139 ms    P Offset 196 ms    T Offset 404 ms    QTC Fredericia 431 ms   CBC and Auto Differential   Result Value Ref Range    WBC 7.7 4.4 - 11.3 x10*3/uL    nRBC 0.0 0.0 - 0.0 /100 WBCs    RBC 4.78 4.00 - 5.20 x10*6/uL    Hemoglobin 11.8 (L) 12.0 - 16.0 g/dL    Hematocrit 35.8 (L) 36.0 - 46.0 %    MCV 75 (L) 80 - 100 fL    MCH 24.7 (L) 26.0 - 34.0 pg    MCHC 33.0 32.0 - 36.0 g/dL    RDW 15.3 (H) 11.5 - 14.5 %    Platelets 317 150 - 450 x10*3/uL    Neutrophils % 80.8 40.0 - 80.0 %    Immature Granulocytes %, Automated 1.3 (H) 0.0 - 0.9 %    Lymphocytes % 14.4 13.0 - 44.0 %    Monocytes % 3.4 2.0 - 10.0 %    Eosinophils % 0.0 0.0 - 6.0 %    Basophils % 0.1 0.0 - 2.0 %    Neutrophils Absolute 6.22 1.20 - 7.70 x10*3/uL    Immature Granulocytes Absolute, Automated 0.10 0.00 - 0.70 x10*3/uL    Lymphocytes Absolute 1.11 (L) 1.20 - 4.80 x10*3/uL    Monocytes Absolute 0.26 0.10 - 1.00 x10*3/uL    Eosinophils Absolute 0.00 0.00 - 0.70 x10*3/uL    Basophils Absolute 0.01 0.00 - 0.10 x10*3/uL   Comprehensive Metabolic Panel   Result Value Ref Range    Glucose 259 (H) 74 - 99 mg/dL    Sodium 139 136 - 145 mmol/L    Potassium 3.7 3.5 - 5.3 mmol/L    Chloride 104 98 - 107 mmol/L    Bicarbonate 25 21 - 32 mmol/L    Anion Gap 14 10 - 20 mmol/L    Urea Nitrogen 16 6 - 23 mg/dL    Creatinine 0.95 0.50 - 1.05 mg/dL    eGFR 68 >60 mL/min/1.73m*2    Calcium 9.6 8.6 - 10.6 mg/dL    Albumin 4.1 3.4 - 5.0 g/dL    Alkaline Phosphatase 116 33 - 136 U/L    Total Protein 7.2 6.4 - 8.2 g/dL    AST 16 9 - 39 U/L    Bilirubin, Total 0.3 0.0 - 1.2 mg/dL    ALT 28 7 - 45 U/L   Magnesium   Result Value Ref Range    Magnesium 1.71 1.60 - 2.40 mg/dL   B-type natriuretic peptide   Result Value Ref Range     (H) 0 - 99 pg/mL   Troponin I, High  Sensitivity, Initial   Result Value Ref Range    Troponin I, High Sensitivity 10 0 - 34 ng/L   BLOOD GAS VENOUS FULL PANEL   Result Value Ref Range    POCT pH, Venous 7.37 7.33 - 7.43 pH    POCT pCO2, Venous 46 41 - 51 mm Hg    POCT pO2, Venous 42 35 - 45 mm Hg    POCT SO2, Venous 61 45 - 75 %    POCT Oxy Hemoglobin, Venous 59.8 45.0 - 75.0 %    POCT Hematocrit Calculated, Venous 36.0 36.0 - 46.0 %    POCT Sodium, Venous 137 136 - 145 mmol/L    POCT Potassium, Venous 3.8 3.5 - 5.3 mmol/L    POCT Chloride, Venous 103 98 - 107 mmol/L    POCT Ionized Calicum, Venous 1.22 1.10 - 1.33 mmol/L    POCT Glucose, Venous 284 (H) 74 - 99 mg/dL    POCT Lactate, Venous 2.0 0.4 - 2.0 mmol/L    POCT Base Excess, Venous 0.9 -2.0 - 3.0 mmol/L    POCT HCO3 Calculated, Venous 26.6 (H) 22.0 - 26.0 mmol/L    POCT Hemoglobin, Venous 12.1 12.0 - 16.0 g/dL    POCT Anion Gap, Venous 11.0 10.0 - 25.0 mmol/L    Patient Temperature 37.0 degrees Celsius    FiO2 21 %   TSH with reflex to Free T4 if abnormal   Result Value Ref Range    Thyroid Stimulating Hormone 0.60 0.44 - 3.98 mIU/L   Phosphorus   Result Value Ref Range    Phosphorus 3.2 2.5 - 4.9 mg/dL   Type And Screen   Result Value Ref Range    ABO TYPE B     Rh TYPE POS     ANTIBODY SCREEN NEG    D-dimer, quantitative   Result Value Ref Range    D-Dimer Non VTE, Quant (ng/mL FEU) 421 <=500 ng/mL FEU   Sars-CoV-2 and Influenza A/B PCR   Result Value Ref Range    Flu A Result Not Detected Not Detected    Flu B Result Not Detected Not Detected    Coronavirus 2019, PCR Not Detected Not Detected   Troponin, High Sensitivity, 1 Hour   Result Value Ref Range    Troponin I, High Sensitivity 10 0 - 34 ng/L   POCT GLUCOSE   Result Value Ref Range    POCT Glucose 223 (H) 74 - 99 mg/dL   POCT GLUCOSE   Result Value Ref Range    POCT Glucose 166 (H) 74 - 99 mg/dL       Imaging Results  ECG 12 lead    Result Date: 3/3/2024  Sinus tachycardia T wave abnormality, consider inferolateral ischemia Abnormal  ECG No previous ECGs available See ED provider note for full interpretation and clinical correlation Confirmed by Audra Bermeo ([id number]) on 3/3/2024 7:12:37 PM    XR chest 1 view    Result Date: 3/3/2024  Interpreted By:  Christoph Villalobos, STUDY: XR CHEST 1 VIEW;  3/3/2024 12:07 pm   INDICATION: Signs/Symptoms:sob.   COMPARISON: None.   ACCESSION NUMBER(S): [id number]   ORDERING CLINICIAN: JI EVANGELISTA   FINDINGS:         CARDIOMEDIASTINAL SILHOUETTE: Cardiomediastinal silhouette is enlarged. There is no evidence of edema.   LUNGS: Lungs are clear. There is no consolidation or effusion   ABDOMEN: No remarkable upper abdominal findings.   BONES: No acute osseous changes.       Enlarged cardiac silhouette. No evidence of acute cardiopulmonary process.     MACRO: None   Signed by: Christoph Villalobos 3/3/2024 12:11 PM Dictation workstation:   [id number]      Medications:  hydroCHLOROthiazide, 12.5 mg, oral, Daily  insulin lispro, 0-10 Units, subcutaneous, TID with meals       PRN medications: dextrose 10 % in water (D10W), dextrose, glucagon     Assessment/Plan     Ela Shankar continues to be managed in accordance with the CDU clinical guidelines for dyspnea. An update of their clinical problem list included:  1.)  Dyspnea  -Continuous telemetry  -Stress echocardiogram in the a.m.    2.)  Pedal edema  -Continuous telemetry  -Stress echocardiogram in the a.m.    We will observe the patient for the following endpoints:   1.)  Stable vitals  2.)  No acute arrhythmic changes  3.)  Symptomatic improvement    When met, appropriate disposition will be arranged.    Ela Shankar has been admitted to the CDU for 13 hours. I spent 25 minutes in the professional and overall care of this patient   @OBS@    Audra Bermeo, APRN-CNP  [facility]  Emergency Department  [place] [phone]

## 2024-03-05 VITALS
RESPIRATION RATE: 18 BRPM | TEMPERATURE: 96.8 F | BODY MASS INDEX: 47.48 KG/M2 | DIASTOLIC BLOOD PRESSURE: 80 MMHG | SYSTOLIC BLOOD PRESSURE: 128 MMHG | HEIGHT: 62 IN | OXYGEN SATURATION: 100 % | WEIGHT: 258 LBS | HEART RATE: 72 BPM

## 2024-03-05 LAB
BNP SERPL-MCNC: 262 PG/ML (ref 0–99)
GLUCOSE BLD MANUAL STRIP-MCNC: 124 MG/DL (ref 74–99)
GLUCOSE BLD MANUAL STRIP-MCNC: 244 MG/DL (ref 74–99)

## 2024-03-05 PROCEDURE — 36415 COLL VENOUS BLD VENIPUNCTURE: CPT

## 2024-03-05 PROCEDURE — 2500000001 HC RX 250 WO HCPCS SELF ADMINISTERED DRUGS (ALT 637 FOR MEDICARE OP)

## 2024-03-05 PROCEDURE — 2500000001 HC RX 250 WO HCPCS SELF ADMINISTERED DRUGS (ALT 637 FOR MEDICARE OP): Performed by: PHYSICIAN ASSISTANT

## 2024-03-05 PROCEDURE — 82947 ASSAY GLUCOSE BLOOD QUANT: CPT

## 2024-03-05 PROCEDURE — G0378 HOSPITAL OBSERVATION PER HR: HCPCS

## 2024-03-05 PROCEDURE — 83880 ASSAY OF NATRIURETIC PEPTIDE: CPT

## 2024-03-05 RX ORDER — METOPROLOL TARTRATE 100 MG/1
100 TABLET ORAL 2 TIMES DAILY
Status: DISCONTINUED | OUTPATIENT
Start: 2024-03-05 | End: 2024-03-05 | Stop reason: HOSPADM

## 2024-03-05 RX ORDER — TRIAMTERENE/HYDROCHLOROTHIAZID 37.5-25 MG
1 TABLET ORAL DAILY
Status: DISCONTINUED | OUTPATIENT
Start: 2024-03-05 | End: 2024-03-05 | Stop reason: HOSPADM

## 2024-03-05 RX ORDER — LOSARTAN POTASSIUM 25 MG/1
25 TABLET ORAL DAILY
Status: DISCONTINUED | OUTPATIENT
Start: 2024-03-05 | End: 2024-03-05 | Stop reason: HOSPADM

## 2024-03-05 RX ORDER — TRIAMTERENE AND HYDROCHLOROTHIAZIDE 37.5; 25 MG/1; MG/1
1 CAPSULE ORAL EVERY MORNING
COMMUNITY

## 2024-03-05 RX ORDER — TOPIRAMATE 25 MG/1
50 TABLET ORAL 2 TIMES DAILY
Status: DISCONTINUED | OUTPATIENT
Start: 2024-03-05 | End: 2024-03-05 | Stop reason: HOSPADM

## 2024-03-05 RX ORDER — ATORVASTATIN CALCIUM 80 MG/1
80 TABLET, FILM COATED ORAL DAILY
Status: DISCONTINUED | OUTPATIENT
Start: 2024-03-05 | End: 2024-03-05 | Stop reason: HOSPADM

## 2024-03-05 RX ORDER — METOPROLOL TARTRATE 100 MG/1
100 TABLET ORAL 2 TIMES DAILY
COMMUNITY

## 2024-03-05 RX ORDER — POLYETHYLENE GLYCOL 3350 17 G/17G
17 POWDER, FOR SOLUTION ORAL DAILY
Status: DISCONTINUED | OUTPATIENT
Start: 2024-03-05 | End: 2024-03-05 | Stop reason: HOSPADM

## 2024-03-05 RX ORDER — POLYETHYLENE GLYCOL 3350 17 G/17G
17 POWDER, FOR SOLUTION ORAL DAILY
COMMUNITY

## 2024-03-05 RX ADMIN — TOPIRAMATE 50 MG: 25 TABLET, FILM COATED ORAL at 05:39

## 2024-03-05 RX ADMIN — HYDROCHLOROTHIAZIDE 12.5 MG: 25 TABLET ORAL at 09:16

## 2024-03-05 RX ADMIN — INSULIN LISPRO 4 UNITS: 100 INJECTION, SOLUTION INTRAVENOUS; SUBCUTANEOUS at 11:51

## 2024-03-05 RX ADMIN — LOSARTAN POTASSIUM 25 MG: 25 TABLET, FILM COATED ORAL at 09:15

## 2024-03-05 RX ADMIN — ATORVASTATIN CALCIUM 80 MG: 80 TABLET, FILM COATED ORAL at 09:20

## 2024-03-05 RX ADMIN — TRIAMTERENE AND HYDROCHLOROTHIAZIDE 1 TABLET: 37.5; 25 TABLET ORAL at 09:17

## 2024-03-05 RX ADMIN — METOPROLOL TARTRATE 100 MG: 100 TABLET, FILM COATED ORAL at 09:16

## 2024-03-05 NOTE — PROGRESS NOTES
Pharmacy Medication History Review    Ela Shankar is a 63 y.o. female admitted for Fluid excess. Pharmacy reviewed the patient's [id number] medications and allergies for accuracy.    The list below reflects the updated PTA list. Comments regarding how patient may be taking medications differently can be found in the Admit Orders Activity  Prior to Admission Medications   Prescriptions Last Dose Informant   Mounjaro 10 mg/0.5 mL pen injector Past Week at patient states that she takes this medication  weekly on mondays Self   Sig: Inject 10 mg under the skin 1 (one) time per week.   OneTouch Delica Plus Lancet 33 gauge misc Unknown Self   OneTouch Ultra2 Meter misc Unknown Self   atorvastatin (Lipitor) 80 mg tablet Past Week at patient request  refills Self   Sig: Take 1 tablet (80 mg) by mouth once daily.   baclofen (Lioresal) 20 mg tablet Past Week Self   Sig: Take 1 tablet (20 mg) by mouth 3 times a day. Stop methocarbamol   blood sugar diagnostic strip Unknown Self   Sig: Use as instructed   losartan (Cozaar) 25 mg tablet Past Week at patient requsr Self   Sig: Take 1 tablet (25 mg) by mouth once daily.   metoprolol tartrate (Lopressor) 100 mg tablet Past Week at patient request refills Self   Sig: Take 1 tablet (100 mg) by mouth 2 times a day.   polyethylene glycol (Glycolax, Miralax) 17 gram packet Past Week at patient request refill Self   Sig: Take 17 g by mouth once daily.   topiramate (Topamax) 50 mg tablet Past Week Self   Sig: Take 1 tablet (50 mg) by mouth 2 times a day.   triamterene-hydrochlorothiazid (Dyazide) 37.5-25 mg capsule Past Week at patient request refills Self   Sig: Take 1 capsule by mouth once daily in the morning.   zolpidem (Ambien) 10 mg tablet Unknown at Pt states still has some supply at home, no OARRS records, no recent fill history Self   Sig: Take 1 tablet (10 mg) by mouth as needed at bedtime for sleep.      Facility-Administered Medications Last Administration Doses  Remaining   perflutren lipid microspheres (Definity) injection 10 mL of dilution None recorded 1         The list below reflects the updated allergy list. Please review each documented allergy for additional clarification and justification.  Allergies  Reviewed by Sam Vogel CPhT on 3/5/2024        Severity Reactions Comments    Lisinopril Not Specified Cough             Patient accepts M2B at discharge. Pharmacy has been updated to [facility].    Sources used to complete the med history include   Allergy list from epic   Epic dispense history   [facility] ( none )  Patient interview   3/2/2024 [facility] office visit summary  3/3/2024 [facility] clinical summary     Below are additional concerns with the patient's PTA list.  Patient is a good historian knows medication name frequency and indication - patient also has medication list from their most recent discharge from [facility] - summary date being 3/2/2024   Patient request refills: polyethylene glycol 17 gram packet - metoprolol 100 mg - losartan 25 mg - atorvastatin 80 mg - triamterene-hydrochlorothiazide 37.5-25 mg - tirzepatide 10 mg /0.5 ml     Sam Vogel CPhT  Transitions of Care Pharmacy Technician  [facility]  Please reach out via appening Secure Chat for questions, or if no response call "[employer] or [employer] “[employer]”

## 2024-03-05 NOTE — DISCHARGE SUMMARY
"Disposition Note  Clinical Decision Unit   Patient: Ela Shankar  MRN: [id number]  : 1960  Encounter Date: 3/3/2024  CDU Provider: Carlos Hebert PA-C      Subjective:    Ela Shankar is a 63 y.o. female has undergone comprehensive diagnostic evaluation and therapeutic management in accordance with the CDU guidelines for shortness of breath. Based on the patient's clinical response and diagnostic information during this period of observation, it has been determined that the patient will be discharged home.    This is a 63-year-old female with a history of hypertension, type 2 diabetes, hyperlipidemia who was admitted to the CDU for cardiac workup.  She had a elevated BNP in the 500s concerning for new onset CHF.    The patient underwent a stress echocardiogram in the CDU revealing an EF of 45% at rest, otherwise no evidence of ischemia.  She is asymptomatic.  She reports of few \"years\" of orthopnea and intermittent leg edema.  Concerning for congestive heart failure.  She currently appears euvolemic, is without pitting edema in her lower extremities, mentating appropriately in no apparent respiratory distress, lungs are clear.  Advised to continue her home medications including hydrochlorothiazide.  We will discharge patient with instructions for a sodium controlled diet and will refer her to cardiology for further management.    Orders Placed This Encounter   Procedures    XR chest 1 view    Point of Care Ultrasound    Troponin I Series, High Sensitivity (0, 1 HR)    CBC and Auto Differential    Comprehensive Metabolic Panel    Magnesium    B-type natriuretic peptide    Troponin I, High Sensitivity, Initial    BLOOD GAS VENOUS FULL PANEL    TSH with reflex to Free T4 if abnormal    Phosphorus    Type And Screen    Troponin, High Sensitivity, 1 Hour    D-dimer, quantitative    Sars-CoV-2 and Influenza A/B PCR    B-type natriuretic peptide    Referral to Cardiology    Adult diet 2-3 grams sodium    Vital Signs    " Cardiac Monitoring - ED/ICU/PACU Only    Diet instructions to nursing: 15 grams oral carbohydrate for low blood glucose    Glucose 10-70 mg/dL & CONSCIOUS- Give 15 Grams of Carbohydrates and repeat until blood glucose level reaches 100 mg/dL or greater.    Notify provider (specify parameters)    Notify provider (specify parameters)    Notify provider (specify parameters)    POCT GLUCOSE    POCT GLUCOSE    POCT GLUCOSE    POCT GLUCOSE    POCT GLUCOSE    POCT GLUCOSE    POCT GLUCOSE    ECG 12 lead    ECG 12 lead    ECG 12 lead    Insert and maintain peripheral IV    Send to CDU    Initiate observation status       Results for orders placed or performed during the hospital encounter of 03/03/24   CBC and Auto Differential   Result Value Ref Range    WBC 7.7 4.4 - 11.3 x10*3/uL    nRBC 0.0 0.0 - 0.0 /100 WBCs    RBC 4.78 4.00 - 5.20 x10*6/uL    Hemoglobin 11.8 (L) 12.0 - 16.0 g/dL    Hematocrit 35.8 (L) 36.0 - 46.0 %    MCV 75 (L) 80 - 100 fL    MCH 24.7 (L) 26.0 - 34.0 pg    MCHC 33.0 32.0 - 36.0 g/dL    RDW 15.3 (H) 11.5 - 14.5 %    Platelets 317 150 - 450 x10*3/uL    Neutrophils % 80.8 40.0 - 80.0 %    Immature Granulocytes %, Automated 1.3 (H) 0.0 - 0.9 %    Lymphocytes % 14.4 13.0 - 44.0 %    Monocytes % 3.4 2.0 - 10.0 %    Eosinophils % 0.0 0.0 - 6.0 %    Basophils % 0.1 0.0 - 2.0 %    Neutrophils Absolute 6.22 1.20 - 7.70 x10*3/uL    Immature Granulocytes Absolute, Automated 0.10 0.00 - 0.70 x10*3/uL    Lymphocytes Absolute 1.11 (L) 1.20 - 4.80 x10*3/uL    Monocytes Absolute 0.26 0.10 - 1.00 x10*3/uL    Eosinophils Absolute 0.00 0.00 - 0.70 x10*3/uL    Basophils Absolute 0.01 0.00 - 0.10 x10*3/uL   Comprehensive Metabolic Panel   Result Value Ref Range    Glucose 259 (H) 74 - 99 mg/dL    Sodium 139 136 - 145 mmol/L    Potassium 3.7 3.5 - 5.3 mmol/L    Chloride 104 98 - 107 mmol/L    Bicarbonate 25 21 - 32 mmol/L    Anion Gap 14 10 - 20 mmol/L    Urea Nitrogen 16 6 - 23 mg/dL    Creatinine 0.95 0.50 - 1.05 mg/dL     eGFR 68 >60 mL/min/1.73m*2    Calcium 9.6 8.6 - 10.6 mg/dL    Albumin 4.1 3.4 - 5.0 g/dL    Alkaline Phosphatase 116 33 - 136 U/L    Total Protein 7.2 6.4 - 8.2 g/dL    AST 16 9 - 39 U/L    Bilirubin, Total 0.3 0.0 - 1.2 mg/dL    ALT 28 7 - 45 U/L   Magnesium   Result Value Ref Range    Magnesium 1.71 1.60 - 2.40 mg/dL   B-type natriuretic peptide   Result Value Ref Range     (H) 0 - 99 pg/mL   Troponin I, High Sensitivity, Initial   Result Value Ref Range    Troponin I, High Sensitivity 10 0 - 34 ng/L   BLOOD GAS VENOUS FULL PANEL   Result Value Ref Range    POCT pH, Venous 7.37 7.33 - 7.43 pH    POCT pCO2, Venous 46 41 - 51 mm Hg    POCT pO2, Venous 42 35 - 45 mm Hg    POCT SO2, Venous 61 45 - 75 %    POCT Oxy Hemoglobin, Venous 59.8 45.0 - 75.0 %    POCT Hematocrit Calculated, Venous 36.0 36.0 - 46.0 %    POCT Sodium, Venous 137 136 - 145 mmol/L    POCT Potassium, Venous 3.8 3.5 - 5.3 mmol/L    POCT Chloride, Venous 103 98 - 107 mmol/L    POCT Ionized Calicum, Venous 1.22 1.10 - 1.33 mmol/L    POCT Glucose, Venous 284 (H) 74 - 99 mg/dL    POCT Lactate, Venous 2.0 0.4 - 2.0 mmol/L    POCT Base Excess, Venous 0.9 -2.0 - 3.0 mmol/L    POCT HCO3 Calculated, Venous 26.6 (H) 22.0 - 26.0 mmol/L    POCT Hemoglobin, Venous 12.1 12.0 - 16.0 g/dL    POCT Anion Gap, Venous 11.0 10.0 - 25.0 mmol/L    Patient Temperature 37.0 degrees Celsius    FiO2 21 %   TSH with reflex to Free T4 if abnormal   Result Value Ref Range    Thyroid Stimulating Hormone 0.60 0.44 - 3.98 mIU/L   Phosphorus   Result Value Ref Range    Phosphorus 3.2 2.5 - 4.9 mg/dL   Type And Screen   Result Value Ref Range    ABO TYPE B     Rh TYPE POS     ANTIBODY SCREEN NEG    Troponin, High Sensitivity, 1 Hour   Result Value Ref Range    Troponin I, High Sensitivity 10 0 - 34 ng/L   D-dimer, quantitative   Result Value Ref Range    D-Dimer Non VTE, Quant (ng/mL FEU) 421 <=500 ng/mL FEU   Sars-CoV-2 and Influenza A/B PCR   Result Value Ref Range    Flu  A Result Not Detected Not Detected    Flu B Result Not Detected Not Detected    Coronavirus 2019, PCR Not Detected Not Detected   B-type natriuretic peptide   Result Value Ref Range     (H) 0 - 99 pg/mL   POCT GLUCOSE   Result Value Ref Range    POCT Glucose 223 (H) 74 - 99 mg/dL   POCT GLUCOSE   Result Value Ref Range    POCT Glucose 166 (H) 74 - 99 mg/dL   POCT GLUCOSE   Result Value Ref Range    POCT Glucose 164 (H) 74 - 99 mg/dL   POCT GLUCOSE   Result Value Ref Range    POCT Glucose 133 (H) 74 - 99 mg/dL   POCT GLUCOSE   Result Value Ref Range    POCT Glucose 199 (H) 74 - 99 mg/dL   POCT GLUCOSE   Result Value Ref Range    POCT Glucose 124 (H) 74 - 99 mg/dL   POCT GLUCOSE   Result Value Ref Range    POCT Glucose 244 (H) 74 - 99 mg/dL   ECG 12 lead   Result Value Ref Range    Ventricular Rate 103 BPM    Atrial Rate 103 BPM    DE Interval 170 ms    QRS Duration 86 ms    QT Interval 360 ms    QTC Calculation(Bazett) 471 ms    P Axis 77 degrees    R Axis 50 degrees    T Axis 165 degrees    QRS Count 17 beats    Q Onset 224 ms    P Onset 139 ms    P Offset 196 ms    T Offset 404 ms    QTC Fredericia 431 ms            Past History     Past Medical History:   Diagnosis Date    Diabetes mellitus (CMS/HCC)     Hyperlipidemia     Hypertension      History reviewed. No pertinent surgical history.  Social History     Socioeconomic History    Marital status: Single     Spouse name: None    Number of children: None    Years of education: None    Highest education level: None   Occupational History    None   Tobacco Use    Smoking status: Former     Types: Cigarettes    Smokeless tobacco: Never   Substance and Sexual Activity    Alcohol use: None    Drug use: None    Sexual activity: None   Other Topics Concern    None   Social History Narrative    None     Social Determinants of Health     Financial Resource Strain: Not on file   Food Insecurity: Not on file   Transportation Needs: Not on file   Physical Activity: Not  on file   Stress: Not on file   Social Connections: Not on file   Intimate Partner Violence: Not on file   Housing Stability: Not on file         Medications/Allergies     Previous Medications    ATORVASTATIN (LIPITOR) 80 MG TABLET    Take 1 tablet (80 mg) by mouth once daily.    BACLOFEN (LIORESAL) 20 MG TABLET    Take 1 tablet (20 mg) by mouth 3 times a day. Stop methocarbamol    BLOOD SUGAR DIAGNOSTIC STRIP    Use as instructed    LOSARTAN (COZAAR) 25 MG TABLET    Take 1 tablet (25 mg) by mouth once daily.    METOPROLOL TARTRATE (LOPRESSOR) 100 MG TABLET    Take 1 tablet (100 mg) by mouth 2 times a day.    MOUNJARO 10 MG/0.5 ML PEN INJECTOR    Inject 10 mg under the skin 1 (one) time per week.    ONETOUCH DELICA PLUS LANCET 33 GAUGE MISC        ONETOUCH ULTRA2 METER MISC        POLYETHYLENE GLYCOL (GLYCOLAX, MIRALAX) 17 GRAM PACKET    Take 17 g by mouth once daily.    TOPIRAMATE (TOPAMAX) 50 MG TABLET    Take 1 tablet (50 mg) by mouth 2 times a day.    TRIAMTERENE-HYDROCHLOROTHIAZID (DYAZIDE) 37.5-25 MG CAPSULE    Take 1 capsule by mouth once daily in the morning.    ZOLPIDEM (AMBIEN) 10 MG TABLET    Take 1 tablet (10 mg) by mouth as needed at bedtime for sleep.     Allergies   Allergen Reactions    Lisinopril Cough         Review of Systems  All systems reviewed and otherwise negative, except as stated above in HPI.    Diagnostics reviewed by Carlos Hebert PA-C     Labs:  Results for orders placed or performed during the hospital encounter of 03/03/24   CBC and Auto Differential   Result Value Ref Range    WBC 7.7 4.4 - 11.3 x10*3/uL    nRBC 0.0 0.0 - 0.0 /100 WBCs    RBC 4.78 4.00 - 5.20 x10*6/uL    Hemoglobin 11.8 (L) 12.0 - 16.0 g/dL    Hematocrit 35.8 (L) 36.0 - 46.0 %    MCV 75 (L) 80 - 100 fL    MCH 24.7 (L) 26.0 - 34.0 pg    MCHC 33.0 32.0 - 36.0 g/dL    RDW 15.3 (H) 11.5 - 14.5 %    Platelets 317 150 - 450 x10*3/uL    Neutrophils % 80.8 40.0 - 80.0 %    Immature Granulocytes %, Automated 1.3 (H) 0.0 - 0.9 %     Lymphocytes % 14.4 13.0 - 44.0 %    Monocytes % 3.4 2.0 - 10.0 %    Eosinophils % 0.0 0.0 - 6.0 %    Basophils % 0.1 0.0 - 2.0 %    Neutrophils Absolute 6.22 1.20 - 7.70 x10*3/uL    Immature Granulocytes Absolute, Automated 0.10 0.00 - 0.70 x10*3/uL    Lymphocytes Absolute 1.11 (L) 1.20 - 4.80 x10*3/uL    Monocytes Absolute 0.26 0.10 - 1.00 x10*3/uL    Eosinophils Absolute 0.00 0.00 - 0.70 x10*3/uL    Basophils Absolute 0.01 0.00 - 0.10 x10*3/uL   Comprehensive Metabolic Panel   Result Value Ref Range    Glucose 259 (H) 74 - 99 mg/dL    Sodium 139 136 - 145 mmol/L    Potassium 3.7 3.5 - 5.3 mmol/L    Chloride 104 98 - 107 mmol/L    Bicarbonate 25 21 - 32 mmol/L    Anion Gap 14 10 - 20 mmol/L    Urea Nitrogen 16 6 - 23 mg/dL    Creatinine 0.95 0.50 - 1.05 mg/dL    eGFR 68 >60 mL/min/1.73m*2    Calcium 9.6 8.6 - 10.6 mg/dL    Albumin 4.1 3.4 - 5.0 g/dL    Alkaline Phosphatase 116 33 - 136 U/L    Total Protein 7.2 6.4 - 8.2 g/dL    AST 16 9 - 39 U/L    Bilirubin, Total 0.3 0.0 - 1.2 mg/dL    ALT 28 7 - 45 U/L   Magnesium   Result Value Ref Range    Magnesium 1.71 1.60 - 2.40 mg/dL   B-type natriuretic peptide   Result Value Ref Range     (H) 0 - 99 pg/mL   Troponin I, High Sensitivity, Initial   Result Value Ref Range    Troponin I, High Sensitivity 10 0 - 34 ng/L   BLOOD GAS VENOUS FULL PANEL   Result Value Ref Range    POCT pH, Venous 7.37 7.33 - 7.43 pH    POCT pCO2, Venous 46 41 - 51 mm Hg    POCT pO2, Venous 42 35 - 45 mm Hg    POCT SO2, Venous 61 45 - 75 %    POCT Oxy Hemoglobin, Venous 59.8 45.0 - 75.0 %    POCT Hematocrit Calculated, Venous 36.0 36.0 - 46.0 %    POCT Sodium, Venous 137 136 - 145 mmol/L    POCT Potassium, Venous 3.8 3.5 - 5.3 mmol/L    POCT Chloride, Venous 103 98 - 107 mmol/L    POCT Ionized Calicum, Venous 1.22 1.10 - 1.33 mmol/L    POCT Glucose, Venous 284 (H) 74 - 99 mg/dL    POCT Lactate, Venous 2.0 0.4 - 2.0 mmol/L    POCT Base Excess, Venous 0.9 -2.0 - 3.0 mmol/L    POCT HCO3  Calculated, Venous 26.6 (H) 22.0 - 26.0 mmol/L    POCT Hemoglobin, Venous 12.1 12.0 - 16.0 g/dL    POCT Anion Gap, Venous 11.0 10.0 - 25.0 mmol/L    Patient Temperature 37.0 degrees Celsius    FiO2 21 %   TSH with reflex to Free T4 if abnormal   Result Value Ref Range    Thyroid Stimulating Hormone 0.60 0.44 - 3.98 mIU/L   Phosphorus   Result Value Ref Range    Phosphorus 3.2 2.5 - 4.9 mg/dL   Type And Screen   Result Value Ref Range    ABO TYPE B     Rh TYPE POS     ANTIBODY SCREEN NEG    Troponin, High Sensitivity, 1 Hour   Result Value Ref Range    Troponin I, High Sensitivity 10 0 - 34 ng/L   D-dimer, quantitative   Result Value Ref Range    D-Dimer Non VTE, Quant (ng/mL FEU) 421 <=500 ng/mL FEU   Sars-CoV-2 and Influenza A/B PCR   Result Value Ref Range    Flu A Result Not Detected Not Detected    Flu B Result Not Detected Not Detected    Coronavirus 2019, PCR Not Detected Not Detected   B-type natriuretic peptide   Result Value Ref Range     (H) 0 - 99 pg/mL   POCT GLUCOSE   Result Value Ref Range    POCT Glucose 223 (H) 74 - 99 mg/dL   POCT GLUCOSE   Result Value Ref Range    POCT Glucose 166 (H) 74 - 99 mg/dL   POCT GLUCOSE   Result Value Ref Range    POCT Glucose 164 (H) 74 - 99 mg/dL   POCT GLUCOSE   Result Value Ref Range    POCT Glucose 133 (H) 74 - 99 mg/dL   POCT GLUCOSE   Result Value Ref Range    POCT Glucose 199 (H) 74 - 99 mg/dL   POCT GLUCOSE   Result Value Ref Range    POCT Glucose 124 (H) 74 - 99 mg/dL   POCT GLUCOSE   Result Value Ref Range    POCT Glucose 244 (H) 74 - 99 mg/dL   ECG 12 lead   Result Value Ref Range    Ventricular Rate 103 BPM    Atrial Rate 103 BPM    AK Interval 170 ms    QRS Duration 86 ms    QT Interval 360 ms    QTC Calculation(Bazett) 471 ms    P Axis 77 degrees    R Axis 50 degrees    T Axis 165 degrees    QRS Count 17 beats    Q Onset 224 ms    P Onset 139 ms    P Offset 196 ms    T Offset 404 ms    QTC Fredericia 431 ms     Radiographs:  Echocardiogram stress  "test   Final Result      XR chest 1 view   Final Result   Enlarged cardiac silhouette. No evidence of acute cardiopulmonary   process.             MACRO:   None        Signed by: Christoph Villalobos 3/3/2024 12:11 PM   Dictation workstation:   [id number]      Point of Care Ultrasound    (Results Pending)           Physical Exam     Visit Vitals  /80 (BP Location: Right arm, Patient Position: Lying)   Pulse 72   Temp 36 °C (96.8 °F) (Temporal)   Resp 18   Ht 1.575 m (5' 2\")   Wt 117 kg (258 lb)   SpO2 100%   BMI 47.19 kg/m²   Smoking Status Former   BSA 2.26 m²       GENERAL:  The patient appears nourished and normally developed. Vital signs as documented.     HEENT:  Head normocephalic, atraumatic, EOMs intact, PERRLA, Mucous membranes moist. Nares patent without copious rhinorrhea.  No lymphadenopathy.    PULMONARY:  Lungs are clear to auscultation, without any respiratory distress. Able to speak full sentences, no accessory muscle use    CARDIAC:   Normal rate. No murmurs, rubs or gallops    ABDOMEN:  Soft, non-distended, non-tender, BS positive x 4 quadrants, No rebound or guarding, no peritoneal signs, no CVA tenderness, no masses or organomegaly    MUSCULOSKELETAL:   Able to ambulate, Non edematous, with no obvious deformities. Pulses intact distal    SKIN:   Good color, with no significant rashes.  No pallor.    NEURO:  No obvious neurological deficits, normal sensation and strength bilaterally.  Able to follow commands.    Psych: Appropriate mood and affect    Consultants  1) N/A      Impression and Plan    In summary, Ela Shankar has been cared for according to the standard [facility] Clinical Decision Unit observation protocol for Fluid excess. This extended period of observation was specifically required to determine the need for hospitalization. Prior to discharge from observation, the final physical exam is documented above. I have reviewed the results of the labs and " imaging that were performed in the ED as well as the CDU.      Based on the patient's condition and test results, the patient will be discharged home    Date and Time of Disposition.   Discharge: 3/5/2024 1330  Admit: 3/3/2024 1534    Discharge Diagnosis  Fluid excess    Issues Requiring Follow-Up  Concern for heart failure    Discharge Meds     Your medication list        CONTINUE taking these medications        Instructions Last Dose Given Next Dose Due   OneTouch Delica Plus Lancet 33 gauge misc  Generic drug: lancets           OneTouch Ultra2 Meter misc  Generic drug: blood-glucose meter                  ASK your doctor about these medications        Instructions Last Dose Given Next Dose Due   atorvastatin 80 mg tablet  Commonly known as: Lipitor           baclofen 20 mg tablet  Commonly known as: Lioresal           blood sugar diagnostic strip           losartan 25 mg tablet  Commonly known as: Cozaar           metoprolol tartrate 100 mg tablet  Commonly known as: Lopressor           Mounjaro 10 mg/0.5 mL pen injector  Generic drug: tirzepatide           polyethylene glycol 17 gram packet  Commonly known as: Glycolax, Miralax           topiramate 50 mg tablet  Commonly known as: Topamax           triamterene-hydrochlorothiazid 37.5-25 mg capsule  Commonly known as: Dyazide           zolpidem 10 mg tablet  Commonly known as: Ambien                    Test Results Pending At Discharge  Pending Labs       No current pending labs.            Outpatient Follow-Up  No future appointments.      Carlos Hebert PA-C   Emergency Medicine/Clinical Decision Unit   [facility]

## 2024-03-05 NOTE — PROGRESS NOTES
"Daily Progress Note  [facility] CLINICAL DECISION    Date of evaluation: 3/5/2024 at 6:36 AM    Verona Shankar is a 63 year old obese female with PMHx of HTN, T2DM, HLD, Hyperthyroidism, VERN, Insomnia, Migraines, age-related nuclear cataracts and childhood asthma whom has been admitted to the CDU for 37 hours due to new onset ALEJANDRA, orthopnea and edema. Serial assessments of the patient's clinical progress include:  Vitals remain stable without evidence of hypoxia. Patient remains on fluid-restricted diet due to concerns for possible underlying CHF component. On my evaluation, she is overall well and denies any new or worsening chest pain/pressure, palpitations or SOB or unilateral edema. She continues to endorse 2-3 pillow orthopnea and reports difficulties fall asleep at night, in part due to her breathing.  Patient also mentioned history of insomnia and stated that she hasn't been able to get much sleep since being in the hospital due to \"inability to shut my mind off\". Endorses ruminating thoughts at night also keep her awake. States treatment with muscle relaxants, trazadone, benadryl and tylenol PM at home were all in-effective. She states PCP did start her on ambien at one time \"years ago\" but ultimately decided to discontinue off due to concerns for tolerance. However, she states prior CDU provider gave her ambien last night, which was effective and is requesting another dose tonight. OARRS reviewed and appropriate. Will give 10mg ambien x1 for insomnia but advised need for close outpatient follow-up with PCP and likely need for overnight sleep study to rule out sleep apnea.      Objective  PHYSICAL EXAM:   Vital signs reviewed and noted no distress. Afebrile.  General: Patient is conversant, well-appearing and well-nourished. NAD.  Head/Neck: Normocephalic, Atraumatic. Neck is supple, full ROM without lymphadenopathy.   Eyes: PERRL, EOMI without scleral icterus. Conjunctiva " clear.  ENMT: Hearing grossly intact. MMM. Posterior oropharynx unremarkable. Normal phonation. No stridor, drooling or trismus.  Cardiac: Regular rate & rhythm. No murmur, gallops, rubs or extrasystole.  Pulmonary: CTAB with normal effort and good aeration throughout. No accessory muscle usage. No adventitious breath sounds.   Abdomen: Soft, non-tender, nonsurgical. No masses. No rebound, rigidity or guarding. Normoactive BS x 4 quadrants.  MSK: Full Spontaneously ROM intact. Symmetric muscle bulk without step-offs or gross deformity.  Vascular: Distal pulses full and equal. No cyanosis, clubbing. Bilateral non-pitting LE edema in shocking distrubution. Capillary refill < 2s  Skin: WWP. Intact without rashes, lesions or discoloration. Turgor is good.  Neuro: CN II-XII intact. Awake, A&Ox3. Speech is fluent. No focal deficits noted.  Psychiatric: Mood and affect appropriate for situation.    Diagnostic Evaluation:     Labs reviewed     Labs    Results from last 72 hours   Lab Units 03/03/24  1226   WBC AUTO x10*3/uL 7.7   RBC AUTO x10*6/uL 4.78   HEMATOCRIT % 35.8*   MCV fL 75*   MCH pg 24.7*   RDW % 15.3*   PLATELETS AUTO x10*3/uL 317   NEUTROS ABS x10*3/uL 6.22     Results from last 72 hours   Lab Units 03/03/24  1226   GLUCOSE mg/dL 259*   SODIUM mmol/L 139   POTASSIUM mmol/L 3.7   CHLORIDE mmol/L 104   CO2 mmol/L 25   ANION GAP mmol/L 14   BUN mg/dL 16   CREATININE mg/dL 0.95   EGFR mL/min/1.73m*2 68   CALCIUM mg/dL 9.6   MAGNESIUM mg/dL 1.71   PHOSPHORUS mg/dL 3.2      Results from last 72 hours   Lab Units 03/03/24  1226   ALK PHOS U/L 116   BILIRUBIN TOTAL mg/dL 0.3   PROTEIN TOTAL g/dL 7.2   ALT U/L 28   AST U/L 16         Results from last 72 hours   Lab Units 03/03/24  1346 03/03/24  1226   BNP pg/mL  --  507*   TROPHS ng/L 10 10      Results from last 72 hours   Lab Units 03/04/24  1819 03/04/24  1304 03/04/24  0759 03/03/24  2207 03/03/24  1716   POCT GLUCOSE mg/dL 199* 133* 164* 166* 223*      Results  from last 72 hours   Lab Units 03/03/24  1226   POCT PH, VENOUS pH 7.37   POCT PCO2, VENOUS mm Hg 46   POCT PO2, VENOUS mm Hg 42   POCT SO2, VENOUS % 61   POCT HEMATOCRIT CALCULATED, VENOUS % 36.0   POCT SODIUM, VENOUS mmol/L 137   POCT POTASSIUM, VENOUS mmol/L 3.8   POCT CHLORIDE, VENOUS mmol/L 103   POCT IONIZED CALCIUM, VENOUS mmol/L 1.22   POCT GLUCOSE, VENOUS mg/dL 284*   POCT LACTATE, VENOUS mmol/L 2.0   POCT BASE EXCESS, VENOUS mmol/L 0.9   POCT HCO3 CALCULATED, VENOUS mmol/L 26.6*   POCT HEMOGLOBIN, VENOUS g/dL 12.1   POCT ANION GAP, VENOUS mmol/L 11.0   FIO2 % 21        Imaging  Echocardiogram stress test         XR chest 1 view   Final Result   Enlarged cardiac silhouette. No evidence of acute cardiopulmonary   process.             MACRO:   None        Signed by: Christoph Villalobos 3/3/2024 12:11 PM   Dictation workstation:   [id number]      Point of Care Ultrasound    (Results Pending)     ECG 12 lead    Result Date: 3/3/2024  Sinus tachycardia T wave abnormality, consider inferolateral ischemia Abnormal ECG No previous ECGs available See ED provider note for full interpretation and clinical correlation Confirmed by Audra Bermeo ([id number]) on 3/3/2024 7:12:37 PM         Medications:     Scheduled medications      - atorvastatin, 80 mg, oral, Daily  hydroCHLOROthiazide, 12.5 mg, oral, Daily  insulin lispro, 0-10 Units, subcutaneous, TID with meals  losartan, 25 mg, oral, Daily  metoprolol tartrate, 100 mg, oral, BID  perflutren lipid microspheres, 1 mL of dilution, intravenous, Once in imaging  perflutren lipid microspheres, 10 mL of dilution, intravenous, Once  polyethylene glycol, 17 g, oral, Daily  topiramate, 50 mg, oral, BID  triamterene-hydrochlorothiazid, 1 tablet, oral, Daily         Continuous medications      -       PRN medications      - PRN medications: dextrose 10 % in water (D10W), dextrose, glucagon, zolpidem     Assessment & Plan  Ela Shankar continues to be managed in accordance with the CDU  clinical guidelines for new-onset ALEJANDRA/orthopnea. An update of their clinical problem list included:     1) New onset Orthopnea/ALEJANDRA      Elevated BNP    -- Suspected CHF related    -- Continuous cardiac telemetry    -- Fluid restricted diet    -- Monitor VS + pulse oximetry     -- Repeat BNP to evaluate for improvement    -- Resume home Diazide + BP meds    -- Stress ECHO completed 03/04 @ 1200pm, pending read by ECHO lab    2) Insomnia    -- Per patient, she was previously on Ambien PRN    -- Cont. PRN Ambien    -- Advised need for outpatient PCP follow-up and/or sleep medicine    3) HTN/T2DM/HLD/Migraines    -- Resume home meds         > Med reconciliation per pharmacy    -- Mounjaro not on formulary, start on SSI (NPO protocol)    -- Serial Accu-checks    -- Patient to schedule PCP follow-up at time of discharge      Milady Kilgore PA-C  [facility]

## 2024-04-09 ENCOUNTER — OFFICE VISIT (OUTPATIENT)
Dept: CARDIOLOGY | Facility: HOSPITAL | Age: 64
End: 2024-04-09
Payer: COMMERCIAL

## 2024-04-09 VITALS
HEART RATE: 101 BPM | SYSTOLIC BLOOD PRESSURE: 131 MMHG | OXYGEN SATURATION: 97 % | HEIGHT: 63 IN | WEIGHT: 241.2 LBS | BODY MASS INDEX: 42.74 KG/M2 | DIASTOLIC BLOOD PRESSURE: 77 MMHG

## 2024-04-09 DIAGNOSIS — I50.9 NEW ONSET OF CONGESTIVE HEART FAILURE (MULTI): Primary | ICD-10-CM

## 2024-04-09 DIAGNOSIS — I11.0 HYPERTENSIVE HEART DISEASE WITH HEART FAILURE (MULTI): ICD-10-CM

## 2024-04-09 PROCEDURE — 3078F DIAST BP <80 MM HG: CPT | Performed by: INTERNAL MEDICINE

## 2024-04-09 PROCEDURE — 4010F ACE/ARB THERAPY RXD/TAKEN: CPT | Performed by: INTERNAL MEDICINE

## 2024-04-09 PROCEDURE — 1036F TOBACCO NON-USER: CPT | Performed by: INTERNAL MEDICINE

## 2024-04-09 PROCEDURE — 99214 OFFICE O/P EST MOD 30 MIN: CPT | Performed by: INTERNAL MEDICINE

## 2024-04-09 PROCEDURE — 3075F SYST BP GE 130 - 139MM HG: CPT | Performed by: INTERNAL MEDICINE

## 2024-04-09 PROCEDURE — 99204 OFFICE O/P NEW MOD 45 MIN: CPT | Performed by: INTERNAL MEDICINE

## 2024-04-09 ASSESSMENT — PAIN SCALES - GENERAL: PAINLEVEL: 0-NO PAIN

## 2024-04-09 NOTE — PROGRESS NOTES
History Of Present Illness:    eLeann Shankar is a 63 y.o. female referred to us for concerns of a reduced EF on her recent stress test       Past Medical History:  Mrs Shankar is a 63 year old woman with a PMH significant for HTN , DM ,  dyslipidemia and asthma who presented to the ED beginning of March 2023 with complaints of SOB which had been going on for a few days , no accompanying angina , no orthopnea , no PNDs , no palpitations or leg edema   She was concerned it could be an asthma attack   Her BNP was noted to be elevated at  507 , her troponins remained negative , she had a stress test which was negative for ischemia however her EF was read as being 45% and a full echocardiogram was advised   Does not appear she received IV loop diuretics during her stay in the ED   She has not had a recurrence of her symptoms since that presentation and has been doing well , reports being asymptomatic   She also denies having had any history of MI , PCI or any prior cardiac related hospitalizations   Compliant with her medications           Social History:  - Started smoking at age 13 years , 1 pack every 2 days , quit 3 years ago   - Denies having any alcohol use   _ No drug use     Family History:  Negative for early SCD or CAD /MI        Allergies:  Lisinopril    Outpatient Medications:  Current Outpatient Medications   Medication Instructions    atorvastatin (LIPITOR) 80 mg, oral, Daily    baclofen (LIORESAL) 20 mg, oral, 3 times daily, Stop methocarbamol    blood sugar diagnostic strip Use as instructed    losartan (Cozaar) 25 mg tablet 1 tablet, oral, Daily    metoprolol tartrate (LOPRESSOR) 100 mg, oral, 2 times daily    Mounjaro 10 mg/0.5 mL pen injector 0.5 mL, subcutaneous, Once Weekly    OneTouch Delica Plus Lancet 33 gauge misc     OneTouch Ultra2 Meter misc     polyethylene glycol (GLYCOLAX, MIRALAX) 17 g, oral, Daily    topiramate (Topamax) 50 mg tablet 1 tablet, oral, 2 times daily     "triamterene-hydrochlorothiazid (Dyazide) 37.5-25 mg capsule 1 capsule, oral, Every morning        Last Recorded Vitals:  Vitals:    04/09/24 1010   BP: 131/77   BP Location: Left arm   Patient Position: Sitting   Pulse: 101   SpO2: 97%   Weight: 109 kg (241 lb 3.2 oz)   Height: 1.588 m (5' 2.5\")       Physical Exam:    GEN: NAD , AOX3  HEENT : JVP at the clavicle   Heart : regular rhythm , normal S1 and S2 , no murmurs   Lungs : clear , resonant , normal air entry bilaterally   Abdomen : soft , non tender   Ext: warm , well perfused , no edema   Neuro : grossly intact          Last Labs:  CBC -  Lab Results   Component Value Date    WBC 7.7 03/03/2024    HGB 11.8 (L) 03/03/2024    HCT 35.8 (L) 03/03/2024    MCV 75 (L) 03/03/2024     03/03/2024       CMP -  Lab Results   Component Value Date    CALCIUM 9.6 03/03/2024    PHOS 3.2 03/03/2024    PROT 7.2 03/03/2024    ALBUMIN 4.1 03/03/2024    AST 16 03/03/2024    ALT 28 03/03/2024    ALKPHOS 116 03/03/2024    BILITOT 0.3 03/03/2024           RENAL FUNCTION PANEL -   Lab Results   Component Value Date    GLUCOSE 259 (H) 03/03/2024     03/03/2024    K 3.7 03/03/2024     03/03/2024    CO2 25 03/03/2024    ANIONGAP 14 03/03/2024    BUN 16 03/03/2024    CREATININE 0.95 03/03/2024    CALCIUM 9.6 03/03/2024    PHOS 3.2 03/03/2024    ALBUMIN 4.1 03/03/2024        Lab Results   Component Value Date     (H) 03/05/2024    HGBA1C 7.0 (H) 11/10/2023       Last Cardiology Tests:    Echo:  Echocardiogram stress test 03/04/2024    Stress Test:  1. The resting ejection fraction was estimated at 45 to 50% with a peak exercise ejection fraction estimated at 60 to 65%.   2. Mildly decreased global left ventricular systolic function.   3. Adequate level of stress achieved.   4. No clinical, echocardiographic or electrocardiographic evidence for ischemia at a maximal infusion.   5. No stress induced ischemia, however resting EF is reduced. Suggest full echo.   6. " There were no stress-induced wall motion abnormalities. This is a negative stress echo test for ischemia.      Assessment/Plan   Mrs Shankar is a 63 year old woman with a PMH significant for HTN , DM ,  dyslipidemia and asthma who presented to the ED beginning of March 2023 with complaints of SOB , noted to have an EF of 45-50% on her stress test .   Euvolemic on examination   Will obtain a surface echocardiogram for an accurate assessment of her EF and her valvular function   Will follow up with her afterwards       Argentina Spangler MD

## 2024-04-09 NOTE — PATIENT INSTRUCTIONS
To reach Dr. Spangler's office please call [phone](Alejandro). Fax [phone]. Call [phone] to schedule an appointment. You may also contact the HF RNs at [email or web address]     Thank you for coming to your appointment today. If you have any questions or need cardiac medication refills, please call the Heart Failure Office at [phone] option 6.   You may also contact the HF RNs at [email or web address]      Please schedule an echocardiogram at [phone]  Schedule a follow up visit after the echocardiogram at [phone]

## 2024-04-09 NOTE — PROGRESS NOTES
Referred by Dr. Hebert for Heart Failure (NPV )     History Of Present Illness:    Leeann Shankar is a 63 y.o. female presenting with new onset CHF.    Social Hx: Denies smoking, ETOH, illicits  Family Hx: Denies     Interval Hx:     Currently denies chest pain, palpitations, shortness of breath. No edema noted in BLE. Patient denies headaches, dizziness or recent falls.      Patient reports slight dyspnea on exertion, orthopnea, and PND.     Hospitalizations: Admitted 3/3-3/5 for new onset CHF   Medication adherence: Patient states yes   Diet adherence: Patient states yes  Exercise: Patient uses treadmill    Past Medical History:  She has a past medical history of Diabetes mellitus (CMS/[facility]), Hyperlipidemia, and Hypertension.    Past Surgical History:  She has no past surgical history on file.      Social History:  She reports that she has quit smoking. Her smoking use included cigarettes. She has never used smokeless tobacco. She reports that she does not currently use alcohol. She reports that she does not currently use drugs.    Family History:  No family history on file.     Allergies:  Lisinopril    Outpatient Medications:  Current Outpatient Medications   Medication Instructions    atorvastatin (LIPITOR) 80 mg, oral, Daily    baclofen (LIORESAL) 20 mg, oral, 3 times daily, Stop methocarbamol    blood sugar diagnostic strip Use as instructed    losartan (Cozaar) 25 mg tablet 1 tablet, oral, Daily    metoprolol tartrate (LOPRESSOR) 100 mg, oral, 2 times daily    Mounjaro 10 mg/0.5 mL pen injector 0.5 mL, subcutaneous, Once Weekly    OneTouch Delica Plus Lancet 33 gauge misc     OneTouch Ultra2 Meter misc     polyethylene glycol (GLYCOLAX, MIRALAX) 17 g, oral, Daily    topiramate (Topamax) 50 mg tablet 1 tablet, oral, 2 times daily    triamterene-hydrochlorothiazid (Dyazide) 37.5-25 mg capsule 1 capsule, oral, Every morning        Last Recorded Vitals:  Vitals:    04/09/24 1010   BP: 131/77   BP Location: Left arm  "  Patient Position: Sitting   Pulse: 101   SpO2: 97%   Weight: 109 kg (241 lb 3.2 oz)   Height: 1.588 m (5' 2.5\")       Physical Exam:  ***       Last Labs:  CBC -  Lab Results   Component Value Date    WBC 7.7 03/03/2024    HGB 11.8 (L) 03/03/2024    HCT 35.8 (L) 03/03/2024    MCV 75 (L) 03/03/2024     03/03/2024       CMP -  Lab Results   Component Value Date    CALCIUM 9.6 03/03/2024    PHOS 3.2 03/03/2024    PROT 7.2 03/03/2024    ALBUMIN 4.1 03/03/2024    AST 16 03/03/2024    ALT 28 03/03/2024    ALKPHOS 116 03/03/2024    BILITOT 0.3 03/03/2024       LIPID PANEL -   No results found for: \"CHOL\", \"TRIG\", \"HDL\", \"CHHDL\", \"LDLF\", \"VLDL\", \"NHDL\"    RENAL FUNCTION PANEL -   Lab Results   Component Value Date    GLUCOSE 259 (H) 03/03/2024     03/03/2024    K 3.7 03/03/2024     03/03/2024    CO2 25 03/03/2024    ANIONGAP 14 03/03/2024    BUN 16 03/03/2024    CREATININE 0.95 03/03/2024    CALCIUM 9.6 03/03/2024    PHOS 3.2 03/03/2024    ALBUMIN 4.1 03/03/2024        Lab Results   Component Value Date     (H) 03/05/2024    HGBA1C 7.0 (H) 11/10/2023       Last Cardiology Tests:  ECG:  ECG 12 lead 03/03/2024    ***  Echo:  Echocardiogram stress test 03/04/2024    ***  Ejection Fractions:  No results found for: \"EF\"  ***  Cath:  No results found for this or any previous visit from the past 1095 days.    ***  Stress Test:  No results found for this or any previous visit from the past 1095 days.    ***  Cardiac Imaging:  No results found for this or any previous visit from the past 1095 days.    ***    {Lab/Diag/Rad Review:88961}    Assessment/Plan   {Assess/Plan SmartLinks:30245}        SANTIAGO WOOD RN  "

## 2024-04-23 ENCOUNTER — APPOINTMENT (OUTPATIENT)
Dept: CARDIOLOGY | Facility: HOSPITAL | Age: 64
End: 2024-04-23
Payer: COMMERCIAL

## 2024-04-30 ENCOUNTER — HOSPITAL ENCOUNTER (OUTPATIENT)
Dept: CARDIOLOGY | Facility: HOSPITAL | Age: 64
Discharge: HOME | End: 2024-04-30
Payer: COMMERCIAL

## 2024-04-30 DIAGNOSIS — I50.9 NEW ONSET OF CONGESTIVE HEART FAILURE (MULTI): ICD-10-CM

## 2024-04-30 DIAGNOSIS — I11.0 HYPERTENSIVE HEART DISEASE WITH HEART FAILURE (MULTI): ICD-10-CM

## 2024-04-30 LAB
AORTIC VALVE PEAK VELOCITY: 1.02 M/S
AV PEAK GRADIENT: 4.2 MMHG
AVA (PEAK VEL): 2.47 CM2
EJECTION FRACTION APICAL 4 CHAMBER: 26.1
LEFT VENTRICLE INTERNAL DIMENSION DIASTOLE: 5.7 CM (ref 3.5–6)
LEFT VENTRICULAR OUTFLOW TRACT DIAMETER: 1.8 CM
LV EJECTION FRACTION BIPLANE: 31 %
RIGHT VENTRICLE FREE WALL PEAK S': 13.3 CM/S
TRICUSPID ANNULAR PLANE SYSTOLIC EXCURSION: 2 CM

## 2024-04-30 PROCEDURE — 93306 TTE W/DOPPLER COMPLETE: CPT

## 2024-04-30 PROCEDURE — 2500000004 HC RX 250 GENERAL PHARMACY W/ HCPCS (ALT 636 FOR OP/ED): Performed by: INTERNAL MEDICINE

## 2024-04-30 PROCEDURE — 93306 TTE W/DOPPLER COMPLETE: CPT | Performed by: INTERNAL MEDICINE

## 2024-04-30 RX ADMIN — PERFLUTREN 2 ML OF DILUTION: 6.52 INJECTION, SUSPENSION INTRAVENOUS at 11:24

## 2024-09-27 ENCOUNTER — OFFICE VISIT (OUTPATIENT)
Dept: CARDIOLOGY | Facility: HOSPITAL | Age: 64
End: 2024-09-27
Payer: COMMERCIAL

## 2024-09-27 VITALS
DIASTOLIC BLOOD PRESSURE: 80 MMHG | WEIGHT: 248 LBS | HEART RATE: 62 BPM | HEIGHT: 63 IN | BODY MASS INDEX: 43.94 KG/M2 | SYSTOLIC BLOOD PRESSURE: 153 MMHG | OXYGEN SATURATION: 97 %

## 2024-09-27 DIAGNOSIS — I50.20 HFREF (HEART FAILURE WITH REDUCED EJECTION FRACTION): ICD-10-CM

## 2024-09-27 DIAGNOSIS — I11.0 HYPERTENSIVE HEART DISEASE WITH HEART FAILURE: Primary | ICD-10-CM

## 2024-09-27 DIAGNOSIS — I50.20 ACC/AHA STAGE C SYSTOLIC HEART FAILURE: ICD-10-CM

## 2024-09-27 PROCEDURE — 99215 OFFICE O/P EST HI 40 MIN: CPT | Performed by: STUDENT IN AN ORGANIZED HEALTH CARE EDUCATION/TRAINING PROGRAM

## 2024-09-27 RX ORDER — METOPROLOL SUCCINATE 100 MG/1
100 TABLET, EXTENDED RELEASE ORAL DAILY
Qty: 30 TABLET | Refills: 11 | Status: SHIPPED | OUTPATIENT
Start: 2024-09-27 | End: 2025-09-27

## 2024-09-27 RX ORDER — SPIRONOLACTONE 25 MG/1
25 TABLET ORAL DAILY
Qty: 30 TABLET | Refills: 11 | Status: SHIPPED | OUTPATIENT
Start: 2024-09-27 | End: 2025-09-27

## 2024-09-27 RX ORDER — DAPAGLIFLOZIN 10 MG/1
10 TABLET, FILM COATED ORAL DAILY
Qty: 30 TABLET | Refills: 11 | Status: SHIPPED | OUTPATIENT
Start: 2024-09-27 | End: 2025-09-27

## 2024-09-27 ASSESSMENT — COLUMBIA-SUICIDE SEVERITY RATING SCALE - C-SSRS
6. HAVE YOU EVER DONE ANYTHING, STARTED TO DO ANYTHING, OR PREPARED TO DO ANYTHING TO END YOUR LIFE?: NO
2. HAVE YOU ACTUALLY HAD ANY THOUGHTS OF KILLING YOURSELF?: NO
1. IN THE PAST MONTH, HAVE YOU WISHED YOU WERE DEAD OR WISHED YOU COULD GO TO SLEEP AND NOT WAKE UP?: NO

## 2024-09-27 ASSESSMENT — PATIENT HEALTH QUESTIONNAIRE - PHQ9
SUM OF ALL RESPONSES TO PHQ9 QUESTIONS 1 AND 2: 0
2. FEELING DOWN, DEPRESSED OR HOPELESS: NOT AT ALL
1. LITTLE INTEREST OR PLEASURE IN DOING THINGS: NOT AT ALL

## 2024-09-27 ASSESSMENT — PAIN SCALES - GENERAL: PAINLEVEL: 0-NO PAIN

## 2024-09-27 NOTE — PATIENT INSTRUCTIONS
Thank you for coming in today. If you have any questions or concerns, you may call the Heart Failure Office at [phone] option 6, or [phone].  You may also contact our heart failure nursing team via email on [email or web address].    For quicker results set-up your  [employer] account to receive results and other correspondence directly to your phone.    Please bring all your pills/medications to your Cardiology appointments.    **  -Please start checking your blood pressure a few times a week.  Keep a log and bring this to your future cardiology visits    -We will renew your heart medications today    - Please make the following medication changes, we will send your prescriptions to [facility] [place] :  1.  STOP losartan    2.  1 day after stopping losartan, START Entresto 24/26 mg twice daily    3.  START generic dapagliflozin (Farxiga) 10 mg once daily    4.  START spironolactone 25 mg once daily    5. STOP Metoprolol TARTRATE    6. START Metoprolol SUCCINATE  100 mg ONCE a day    7. STOP Dyazide     - Please have the following tests done:  1.Blood tests in 1 week (CBC, comprehensive panel, TSH with auto reflex, iron, TIBC, ferritin,Vitamin B12,  folate, ENMANUEL with auto reflex, BNP, lipids)    2.  Blood tests in 4 weeks (RFP)    3.  Cardiac MRI (structure, function, morphology, regadenoson protocol)to  evaluate your heart muscle in more detail.  CALL [phone] to schedule this test    -You will be referred to the following teams, CALL  [phone] to schedule your appointments with:  1.  Cardiac rehabilitation    - Please make an appointment to be seen in 4 weeks

## 2024-09-27 NOTE — PROGRESS NOTES
"Referring Clinician:   Primary cardiologist: Dr. 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  My final recommendations will be communicated back to the requesting clinician  by way of shared Medical record.   Review of the electronic medical record shows a past medical history significant for hypertension, OA, hyperlipidemia, asthma, diabetes mellitus, obesity with BMI~45 kg/m².  She presented to [facility] 3/2024 with dyspnea and was found to have elevated natruretic peptides, and depressed ejection fraction.  On TTE 4/2024 LVEF 30-35% LVIDD 5.7 cm.  For HFrEF she is currently maintained on losartan.  There is a documented allergy to lisinopril (cough), she confirmed 9/27/2024 that she only had a cough with lisinopril.    Symptomatically, there is no chest pain, no SOB at rest, mild SOBOE, PND a few nights a week, 1 pillow orthopnoea,  intermittent chronic leg  swelling, no palpitations, no syncope, no pre syncope.    She is enrolled in the weight loss program and is an active participant.    Functionally, her exercise capacity is described as baseline - can walk in [employer] can climb a flight of stairs and walk a city block.  Moreover, she swims twice a week for up to 3 hours without major exertional symptoms.  Her knees and hip pain limit her mobility mostly.    She concedes that she \"loves salty food, fast food and all of that\".    She is strictly adherent with all prescribed medications    Her last HF hospitalisation was 3/2024    Surgical Hx:  - C sections x 4    Past Obstetric Hx:  - G4  - No known cardiac complications of pregnancy    Social Hx:  - Smoking- quit 2021, prev smoked from age [age] years 1/2 PPD  - ETOH- never a heavy drinker, nil now  - Illicit drugs-Last crack ~ 1980  - Lives alone, grandson visits often. Feels safe at home      Family Hx:  Specifically, there is no " "family history of  CAD, heart failure, ICD, PPM, OHT, arrhythmias, CVA, or sudden cardiac death.    Sister-heart disease? Dx  Niece- \"2 blood clots in her heart and fluid to her hips\"  - Cousin- ? Heart pump  Brother--heart disease ? Dx    Review of Systems   Constitutional: Positive for weight loss. Negative for decreased appetite.   HENT:  Negative for sore throat.    Eyes:  Negative for blurred vision and double vision.   Cardiovascular:  Positive for dyspnea on exertion, orthopnea, palpitations and paroxysmal nocturnal dyspnea. Negative for leg swelling.   Respiratory:  Negative for cough and shortness of breath.    Skin:  Negative for color change, dry skin and itching.   Musculoskeletal:  Positive for back pain. Negative for falls.   Gastrointestinal:  Positive for constipation. Negative for abdominal pain, nausea and vomiting.   Genitourinary:  Negative for dysuria.   Neurological:  Positive for headaches. Negative for dizziness and light-headedness.   Psychiatric/Behavioral:  Positive for depression. The patient is nervous/anxious.       Medication reconciliation completed, see below.     Medication Documentation Review Audit       Reviewed by Amy Rodriguez RN (Registered Nurse) on 09/27/24 at 1542      Medication Order Taking? Sig Documenting Provider Last Dose Status   atorvastatin (Lipitor) 80 mg tablet [id number] Yes Take 1 tablet (80 mg) by mouth once daily. Historical MD Matty Taking Active   baclofen (Lioresal) 20 mg tablet [id number] Yes Take 1 tablet (20 mg) by mouth 3 times a day. Stop methocarbamol Sea Starr MD Taking Active   blood sugar diagnostic strip [id number] Yes Use as instructed Sea Starr MD Taking Active   losartan (Cozaar) 25 mg tablet [id number] Yes Take 1 tablet (25 mg) by mouth once daily. Historical MD Matty Taking Active     Discontinued 03/04/24 6579   metoprolol tartrate (Lopressor) 100 mg tablet [id number] Yes Take 1 tablet (100 mg) by mouth 2 times " "a day. Historical Provider, MD Taking Active   Mounjaro 10 mg/0.5 mL pen injector [id number] Yes Inject 10 mg under the skin 1 (one) time per week. Historical Provider, MD Taking Active   OneTouch Delica Plus Lancet 33 gauge misc [id number] Yes  Historical Provider, MD Taking Active   OneTouch Ultra2 Meter misc [id number] Yes  Historical MD Matty Taking Active   perflutren lipid microspheres (Definity) injection 10 mL of dilution [id number]   Moy East MD  Active   polyethylene glycol (Glycolax, Miralax) 17 gram packet [id number] Yes Take 17 g by mouth once daily. Historical Provider, MD Taking Active   topiramate (Topamax) 50 mg tablet [id number] Yes Take 1 tablet (50 mg) by mouth 2 times a day. Historical Provider, MD Taking Active   triamterene-hydrochlorothiazid (Dyazide) 37.5-25 mg capsule [id number] Yes Take 1 capsule by mouth once daily in the morning. Historical Provider, MD Taking Active                     Investigations:    The electronic medical record has been reviewed by me for salient history. All cardiovascular imaging and testing available in the electronic medical record, and [employer] has been reviewed.    Visit Vitals  /80 (BP Location: Left arm, Patient Position: Sitting)   Pulse 62   Ht 1.588 m (5' 2.5\")   Wt 112 kg (248 lb)   SpO2 97%   BMI 44.64 kg/m²   Smoking Status Former   BSA 2.22 m²      On examination:    Very pleasant obese middle aged AA woman in no apparent CP or painful distress  Well  groomed   Neck: No JVD or HJR  CVS: HS 1,2.   No added sounds  Resp: CTA bilaterally. Percussion note resonant  Abdomen: Obese,SNT, BS wnl  Extremities: No pedal oedema  Skin: warm and dry  CNS: AO x 4, no gross deficits    Lab Results   Component Value Date    WBC 7.7 03/03/2024    HGB 11.8 (L) 03/03/2024    HCT 35.8 (L) 03/03/2024    MCV 75 (L) 03/03/2024     03/03/2024       Chemistry    Lab Results   Component Value Date/Time     03/03/2024 1226    K 3.7 03/03/2024 1226     " 03/03/2024 1226    CO2 25 03/03/2024 1226    BUN 16 03/03/2024 1226    CREATININE 0.95 03/03/2024 1226    Lab Results   Component Value Date/Time    CALCIUM 9.6 03/03/2024 1226    ALKPHOS 116 03/03/2024 1226    AST 16 03/03/2024 1226    ALT 28 03/03/2024 1226    BILITOT 0.3 03/03/2024 1226         Transthoracic echo (TTE) complete    Result Date: 4/30/2024   [facility], [address] [place] [address]                Tel [phone] and Fax [phone] TRANSTHORACIC ECHOCARDIOGRAM REPORT  Patient Name:      FLAVIA Freeman Physician:    [id number] Rodolfo Long MD Study Date:        4/30/2024            Ordering Provider:    [id number] DECLAN GRIJALVA MRN/PID:           [id number]             Fellow: Accession#:        [id number]         Nurse:                Cori Coker RN Date of Birth/Age: 1960 / 63 years Sonographer:          Lucille Calderon RDCS Gender:            F                    Additional Staff: Height:            157.48 cm            Admit Date: Weight:            109.32 kg            Admission Status:     Outpatient BSA / BMI:         2.07 m2 / 44.08      Encounter#:           [id number]                    kg/m2                                         Department Location:  [facility] Non                                                               Invasive Blood Pressure: 131 /77 mmHg Study Type:    TRANSTHORACIC ECHO (TTE) COMPLETE Diagnosis/ICD: Heart failure, unspecified-I50.9; Hypertensive heart disease with                heart failure-I11.0 Indication:    New onset of congestive heart failure; hypertensive heart disease                with heart failure CPT Code:      Echo Complete w Full Doppler-54252 Patient  History: Pertinent History: HTN, DM, DLD, asthma. Study Detail: The following Echo studies were performed: 2D, M-Mode, color flow               and Doppler. Technically challenging study due to body habitus.               Definity used as a contrast agent for endocardial border               definition. Total contrast used for this procedure was 2.0 mL via               IV push.  PHYSICIAN INTERPRETATION: Left Ventricle: The left ventricular systolic function is moderately decreased, with an estimated ejection fraction of 30-35%. There is global hypokinesis of the left ventricle with minor regional variations. The left ventricular cavity size is mildly dilated. There is mild eccentric left ventricular hypertrophy. Spectral Doppler shows an impaired relaxation pattern of left ventricular diastolic filling. Left Atrium: The left atrium is normal in size. Right Ventricle: The right ventricle is normal in size. There is normal right ventricular global systolic function. Right Atrium: The right atrium is normal in size. Aortic Valve: The aortic valve is trileaflet. There is trivial aortic valve regurgitation. The peak instantaneous gradient of the aortic valve is 4.2 mmHg. Mitral Valve: The mitral valve is normal in structure. There is trace mitral valve regurgitation. Tricuspid Valve: The tricuspid valve is structurally normal. There is trace tricuspid regurgitation. The right ventricular systolic pressure is unable to be estimated. Pulmonic Valve: The pulmonic valve is structurally normal. The pulmonic valve regurgitation was not well visualized. Pericardium: There is no pericardial effusion noted. Aorta: The aortic root is normal. Systemic Veins: The inferior vena cava appears to be of normal size. In comparison to the previous echocardiogram(s): There are no prior studies on this patient for comparison purposes.  CONCLUSIONS:  1. Left ventricular systolic function is moderately decreased with a 30-35% estimated  ejection fraction.  2. Poorly visualized anatomical structures due to suboptimal image quality.  3. Spectral Doppler shows an impaired relaxation pattern of left ventricular diastolic filling.  4. There is mild eccentric left ventricular hypertrophy.  5. There is global hypokinesis of the left ventricle with minor regional variations. QUANTITATIVE DATA SUMMARY: 2D MEASUREMENTS:                          Normal Ranges: Ao Root d:     2.80 cm   (2.0-3.7cm) LAs:           3.20 cm   (2.7-4.0cm) IVSd:          1.00 cm   (0.6-1.1cm) LVPWd:         0.80 cm   (0.6-1.1cm) LVIDd:         5.70 cm   (3.9-5.9cm) LVIDs:         4.10 cm LV Mass Index: 95.5 g/m2 LV % FS        28.1 % AORTA MEASUREMENTS:                    Normal Ranges: Asc Ao, d: 2.60 cm (2.1-3.4cm) LV SYSTOLIC FUNCTION BY 2D PLANIMETRY (MOD):                     Normal Ranges: EF-A4C View: 26.1 % (>=55%) EF-A2C View: 38.6 % EF-Biplane:  31.3 % AORTIC VALVE:                         Normal Ranges: AoV Vmax:      1.02 m/s (<=1.7m/s) AoV Peak P.2 mmHg (<20mmHg) LVOT Max Alejandro:  0.99 m/s (<=1.1m/s) LVOT VTI:      20.20 cm LVOT Diameter: 1.80 cm  (1.8-2.4cm) AoV Area,Vmax: 2.47 cm2 (2.5-4.5cm2)  RIGHT VENTRICLE: TAPSE: 20.3 mm RV s'  0.13 m/s TRICUSPID VALVE/RVSP:                   Normal Ranges: IVC Diam: 1.40 cm PULMONIC VALVE:                         Normal Ranges: PV Accel Time: 79 msec  (>120ms) PV Max Alejandro:    0.7 m/s  (0.6-0.9m/s) PV Max P.8 mmHg  [id number] Rodolfo Long MD Electronically signed on 2024 at 11:57:59 AM  ** Final **     Echocardiogram stress test    Result Date: 3/5/2024    [facility], [address] [place] [address]                Tel [phone] and Fax [phone]  Dobutamine Stress Echo Patient Name:     OMARI OSEI        Ordering Provider:    [id number] ABDELRAHMAN RODRIGUES Study Date:       3/4/2024            Reading Physician:    [id number] Escobar                                                              MD MRN/PID:          [id number]            Supervising           [id number] Karri Escobar                                       Physician:            MD Accession#:       [id number]        Fellow:               [id number] Moy East MD Date of           1960 / 63      Fellow: Birth/Age:        years Gender:           F                   Nurse:                Sofia Hernandez RN Admission Status: Inpatient           Sonographer:          ADM Height:           157.48 cm           Technologist: Weight:           117.03 kg           Additional Staff: BSA:              2.13 m2             Encounter#:           [id number] BMI:              47.19 kg/m2         Patient Location: Study Type:    ECHOCARDIOGRAM STRESS TEST Diagnosis/ICD: Other forms of dyspnea-R06.09 Indication:    Dyspnea on Exertion CPT Code:      Stress Echo-25989; Stress Test Interpretation-88135; Stress Test                Supervision-[id number] Falls Risk:  Study Details: Correct procedure and correct patient verified verbally and with                ID Band checked.  Patient History: Hypertension, dyslipidemia and dyspnea. Allergies: Lisinopril-cough. Smoker:    Former. Diabetes:  Yes.  Medications: The patient's prescribed medication is HCTZ, metoprolol. The patient took medications as prescribed.  Patient Performance: The patient received 30 mcg/kg/min of Dobutamine and a total dose of 0.25 mg of atropine in divided doses of 0.25 mg at 11:20:28 AM. The resting blood pressure was 166/97 mmHg with a heart rate of 90 bpm. The patient developed no symptoms during the stress exam. The blood pressure response was normal. The test was terminated due to: MPHR >85%. Patient has met the discharge criteria and is discharged to their floor.  Baseline ECG: Resting ECG showed normal sinus rhythm.  Stress ECG: Stress ECG showed sinus tachycardia. No ST changes.  Stress Stage Data:  +---+------+-------+---------------------------------------+ HR Sys BPDias BPComments                                +---+------+-------+---------------------------------------+ 90 166   97                                             +---+------+-------+---------------------------------------+ 87 169   89     Dobutamine @ 5 mcg/kg/min. No symptoms  +---+------+-------+---------------------------------------+ 98 185   79     Dobutamine @ 10mcg/kg/min. No symptoms  +---+------+-------+---------------------------------------+ [id number]   80     Dobutamine @ 20 mcg/kg/min. No symptoms +---+------+-------+---------------------------------------+ 170956   73     Dobutamine @ 30 mcg/kg/min. No symptoms +---+------+-------+---------------------------------------+  Recovery ECG: Recovery ECG showed normal sinus rhythm, with rare PVC.  +------------+---+------+-------+----------------------+             HR Sys BPDias BPComments               +------------+---+------+-------+----------------------+ Recovery I  129             1 minute. No sympoms   +------------+---+------+-------+----------------------+ Recovery II [id number]   74     2 minutes. No symptoms +------------+---+------+-------+----------------------+ Recovery [id number]   73     4 minutes. No symptoms +------------+---+------+-------+----------------------+ Recovery IV 96 153   82     6 minutes. No symtoms  +------------+---+------+-------+----------------------+  Baseline Echo: Global LV systolic function is mildly decreased. The resting baseline ejection fraction was estimated at 45 to 50%. There are no regional wall motion abnormalities at baseline.  Stress Echo: There are no stress induced regional wall motion abnormalities. The ejection fraction is approximately 60 to 65% at peak stress. The left ventricular internal cavity size at peak stress is decreased. At peak stress, the global LV systolic function is  increased.  Summary:  1. The resting ejection fraction was estimated at 45 to 50% with a peak exercise ejection fraction estimated at 60 to 65%.  2. Mildly decreased global left ventricular systolic function.  3. Adequate level of stress achieved.  4. No clinical, echocardiographic or electrocardiographic evidence for ischemia at a maximal infusion.  5. No stress induced ischemia, however resting EF is reduced. Suggest full echo.  6. There were no stress-induced wall motion abnormalities. This is a negative stress echo test for ischemia. 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  My final recommendations will be communicated back to the requesting clinician  by way of shared Medical record.   Review of the electronic medical record shows a past medical history significant for hypertension, OA, hyperlipidemia, asthma, diabetes mellitus, obesity with BMI~45 kg/m².  She presented to [facility] 3/2024 with dyspnea and was found to have elevated natruretic peptides, and depressed ejection fraction.  On TTE 4/2024 LVEF 30-35% LVIDD 5.7 cm.  For HFrEF she is currently maintained on losartan.  There is a documented allergy to lisinopril (cough), she confirmed 9/27/2024 that she only had a cough with lisinopril.    NYHA Functional Class: 2  ACC/AHA Stage C heart failure  Volume status: Euvolemic  Perfusion status: Warm to touch  Aetiology: TBD    PLAN:  #HFrEF  -We will work towards medication optimization, as below, in the first instance  -Cardiac MRI with regadenoson challenge prior to next visit  - Plan for repeat TTE and CPET once heart failure medication is at goal  - Labs: CBC, comprehensive panel, TSH with auto reflex, iron, TIBC, ferritin,Vitamin B12,  folate, ENMANUEL with auto reflex, BNP, lipids  - Genetic testing has not been  undertaken.  There is a vague sense of heart disease in her family, but no clear/known history of other relatives with heart failure  -She was referred to cardiac rehabilitation today  -Once medications are optimised will consider need for device therapy  -Heart failure lifestyle modifications discussed and Qs answered.     -Medication optimisation:  BB: Stop metoprolol titrate         Start metoprolol succinate 100 mg once daily  RAASi: Stop losartan              Start sacubitril/valsartan 24/26 mg twice daily  AA: Start spironolactone 12 5 mg once daily, with close renal function monitoring  SGLT2i: Start generic dapagliflozin 10 mg once daily, we discussed potential side effects   Stop Triamterene-hydrochlorothiazide     COUNSELING:   We discussed the following non-pharmacological measures during this visit:  ·Smoking and alcohol abstinence/cessation, if applicable.  ·Dietary and medication compliance (in particular, salt restriction)  ·Monitoring daily weights and blood pressures  ·Exercise regimen (walking)    Heart Failure Education Booklet given: 9/27/2024    Follow-up appointment in 4 weeks     This note was transcribed using the Dragon Dictation system. There may be grammatical, punctuation, or verbiage errors that can occur with voice recognition programs.    Georgette Ortiz MD PhD

## 2024-10-11 ENCOUNTER — LAB (OUTPATIENT)
Dept: LAB | Facility: LAB | Age: 64
End: 2024-10-11
Payer: COMMERCIAL

## 2024-10-11 DIAGNOSIS — I50.20 ACC/AHA STAGE C SYSTOLIC HEART FAILURE: ICD-10-CM

## 2024-10-11 DIAGNOSIS — I50.20 HFREF (HEART FAILURE WITH REDUCED EJECTION FRACTION): ICD-10-CM

## 2024-10-11 DIAGNOSIS — I11.0 HYPERTENSIVE HEART DISEASE WITH HEART FAILURE: ICD-10-CM

## 2024-10-11 LAB
ALBUMIN SERPL BCP-MCNC: 4.2 G/DL (ref 3.4–5)
ALP SERPL-CCNC: 165 U/L (ref 33–136)
ALT SERPL W P-5'-P-CCNC: 24 U/L (ref 7–45)
ANION GAP SERPL CALC-SCNC: 12 MMOL/L (ref 10–20)
AST SERPL W P-5'-P-CCNC: 21 U/L (ref 9–39)
BASOPHILS # BLD AUTO: 0.05 X10*3/UL (ref 0–0.1)
BASOPHILS NFR BLD AUTO: 0.9 %
BILIRUB SERPL-MCNC: 0.3 MG/DL (ref 0–1.2)
BNP SERPL-MCNC: 183 PG/ML (ref 0–99)
BUN SERPL-MCNC: 13 MG/DL (ref 6–23)
CALCIUM SERPL-MCNC: 9.8 MG/DL (ref 8.6–10.6)
CHLORIDE SERPL-SCNC: 108 MMOL/L (ref 98–107)
CHOLEST SERPL-MCNC: 127 MG/DL (ref 0–199)
CHOLESTEROL/HDL RATIO: 2.8
CO2 SERPL-SCNC: 27 MMOL/L (ref 21–32)
CREAT SERPL-MCNC: 1 MG/DL (ref 0.5–1.05)
EGFRCR SERPLBLD CKD-EPI 2021: 63 ML/MIN/1.73M*2
EOSINOPHIL # BLD AUTO: 0.19 X10*3/UL (ref 0–0.7)
EOSINOPHIL NFR BLD AUTO: 3.5 %
ERYTHROCYTE [DISTWIDTH] IN BLOOD BY AUTOMATED COUNT: 16.1 % (ref 11.5–14.5)
FERRITIN SERPL-MCNC: 139 NG/ML (ref 8–150)
FOLATE SERPL-MCNC: 9.4 NG/ML
GLUCOSE SERPL-MCNC: 150 MG/DL (ref 74–99)
HCT VFR BLD AUTO: 40.6 % (ref 36–46)
HDLC SERPL-MCNC: 45 MG/DL
HGB BLD-MCNC: 12.3 G/DL (ref 12–16)
IMM GRANULOCYTES # BLD AUTO: 0.02 X10*3/UL (ref 0–0.7)
IMM GRANULOCYTES NFR BLD AUTO: 0.4 % (ref 0–0.9)
IRON SATN MFR SERPL: 20 % (ref 25–45)
IRON SERPL-MCNC: 59 UG/DL (ref 35–150)
LDLC SERPL CALC-MCNC: 69 MG/DL
LYMPHOCYTES # BLD AUTO: 2.49 X10*3/UL (ref 1.2–4.8)
LYMPHOCYTES NFR BLD AUTO: 46.1 %
MCH RBC QN AUTO: 24.6 PG (ref 26–34)
MCHC RBC AUTO-ENTMCNC: 30.3 G/DL (ref 32–36)
MCV RBC AUTO: 81 FL (ref 80–100)
MONOCYTES # BLD AUTO: 0.4 X10*3/UL (ref 0.1–1)
MONOCYTES NFR BLD AUTO: 7.4 %
NEUTROPHILS # BLD AUTO: 2.25 X10*3/UL (ref 1.2–7.7)
NEUTROPHILS NFR BLD AUTO: 41.7 %
NON HDL CHOLESTEROL: 82 MG/DL (ref 0–149)
NRBC BLD-RTO: 0 /100 WBCS (ref 0–0)
PHOSPHATE SERPL-MCNC: 4.2 MG/DL (ref 2.5–4.9)
PLATELET # BLD AUTO: 317 X10*3/UL (ref 150–450)
POTASSIUM SERPL-SCNC: 4.7 MMOL/L (ref 3.5–5.3)
PROT SERPL-MCNC: 6.8 G/DL (ref 6.4–8.2)
RBC # BLD AUTO: 4.99 X10*6/UL (ref 4–5.2)
SODIUM SERPL-SCNC: 142 MMOL/L (ref 136–145)
TIBC SERPL-MCNC: 296 UG/DL (ref 240–445)
TRIGL SERPL-MCNC: 67 MG/DL (ref 0–149)
TSH SERPL-ACNC: 1.44 MIU/L (ref 0.44–3.98)
UIBC SERPL-MCNC: 237 UG/DL (ref 110–370)
VIT B12 SERPL-MCNC: 499 PG/ML (ref 211–911)
VLDL: 13 MG/DL (ref 0–40)
WBC # BLD AUTO: 5.4 X10*3/UL (ref 4.4–11.3)

## 2024-10-11 PROCEDURE — 36415 COLL VENOUS BLD VENIPUNCTURE: CPT

## 2024-10-11 PROCEDURE — 84100 ASSAY OF PHOSPHORUS: CPT

## 2024-10-11 PROCEDURE — 83540 ASSAY OF IRON: CPT

## 2024-10-11 PROCEDURE — 84443 ASSAY THYROID STIM HORMONE: CPT

## 2024-10-11 PROCEDURE — 82746 ASSAY OF FOLIC ACID SERUM: CPT

## 2024-10-11 PROCEDURE — 85025 COMPLETE CBC W/AUTO DIFF WBC: CPT

## 2024-10-11 PROCEDURE — 86038 ANTINUCLEAR ANTIBODIES: CPT

## 2024-10-11 PROCEDURE — 80053 COMPREHEN METABOLIC PANEL: CPT

## 2024-10-11 PROCEDURE — 80061 LIPID PANEL: CPT

## 2024-10-11 PROCEDURE — 82728 ASSAY OF FERRITIN: CPT

## 2024-10-11 PROCEDURE — 82607 VITAMIN B-12: CPT

## 2024-10-11 PROCEDURE — 83550 IRON BINDING TEST: CPT

## 2024-10-11 PROCEDURE — 83880 ASSAY OF NATRIURETIC PEPTIDE: CPT

## 2024-10-12 ENCOUNTER — APPOINTMENT (OUTPATIENT)
Dept: RADIOLOGY | Facility: HOSPITAL | Age: 64
End: 2024-10-12
Payer: COMMERCIAL

## 2024-10-12 ENCOUNTER — HOSPITAL ENCOUNTER (EMERGENCY)
Facility: HOSPITAL | Age: 64
Discharge: HOME | End: 2024-10-12
Attending: EMERGENCY MEDICINE
Payer: COMMERCIAL

## 2024-10-12 VITALS
RESPIRATION RATE: 16 BRPM | WEIGHT: 245 LBS | HEART RATE: 84 BPM | HEIGHT: 62 IN | TEMPERATURE: 97.8 F | DIASTOLIC BLOOD PRESSURE: 75 MMHG | BODY MASS INDEX: 45.08 KG/M2 | SYSTOLIC BLOOD PRESSURE: 149 MMHG | OXYGEN SATURATION: 98 %

## 2024-10-12 DIAGNOSIS — R10.9 LEFT FLANK PAIN: Primary | ICD-10-CM

## 2024-10-12 LAB
ALBUMIN SERPL BCP-MCNC: 4.7 G/DL (ref 3.4–5)
ALP SERPL-CCNC: 127 U/L (ref 33–136)
ALT SERPL W P-5'-P-CCNC: 27 U/L (ref 7–45)
ANION GAP SERPL CALC-SCNC: 10 MMOL/L (ref 10–20)
APPEARANCE UR: CLEAR
AST SERPL W P-5'-P-CCNC: 42 U/L (ref 9–39)
BACTERIA #/AREA URNS AUTO: ABNORMAL /HPF
BASOPHILS # BLD AUTO: 0.04 X10*3/UL (ref 0–0.1)
BASOPHILS NFR BLD AUTO: 0.6 %
BILIRUB SERPL-MCNC: 0.5 MG/DL (ref 0–1.2)
BILIRUB UR STRIP.AUTO-MCNC: NEGATIVE MG/DL
BUN SERPL-MCNC: 16 MG/DL (ref 6–23)
CALCIUM SERPL-MCNC: 9.5 MG/DL (ref 8.6–10.6)
CHLORIDE SERPL-SCNC: 104 MMOL/L (ref 98–107)
CO2 SERPL-SCNC: 30 MMOL/L (ref 21–32)
COLOR UR: ABNORMAL
CREAT SERPL-MCNC: 1.01 MG/DL (ref 0.5–1.05)
EGFRCR SERPLBLD CKD-EPI 2021: 63 ML/MIN/1.73M*2
EOSINOPHIL # BLD AUTO: 0.23 X10*3/UL (ref 0–0.7)
EOSINOPHIL NFR BLD AUTO: 3.4 %
ERYTHROCYTE [DISTWIDTH] IN BLOOD BY AUTOMATED COUNT: 15.9 % (ref 11.5–14.5)
GLUCOSE SERPL-MCNC: 97 MG/DL (ref 74–99)
GLUCOSE UR STRIP.AUTO-MCNC: ABNORMAL MG/DL
HCT VFR BLD AUTO: 41.1 % (ref 36–46)
HGB BLD-MCNC: 12.5 G/DL (ref 12–16)
IMM GRANULOCYTES # BLD AUTO: 0.02 X10*3/UL (ref 0–0.7)
IMM GRANULOCYTES NFR BLD AUTO: 0.3 % (ref 0–0.9)
KETONES UR STRIP.AUTO-MCNC: NEGATIVE MG/DL
LEUKOCYTE ESTERASE UR QL STRIP.AUTO: NEGATIVE
LIPASE SERPL-CCNC: 45 U/L (ref 9–82)
LYMPHOCYTES # BLD AUTO: 3.11 X10*3/UL (ref 1.2–4.8)
LYMPHOCYTES NFR BLD AUTO: 46.3 %
MCH RBC QN AUTO: 24.7 PG (ref 26–34)
MCHC RBC AUTO-ENTMCNC: 30.4 G/DL (ref 32–36)
MCV RBC AUTO: 81 FL (ref 80–100)
MONOCYTES # BLD AUTO: 0.53 X10*3/UL (ref 0.1–1)
MONOCYTES NFR BLD AUTO: 7.9 %
NEUTROPHILS # BLD AUTO: 2.79 X10*3/UL (ref 1.2–7.7)
NEUTROPHILS NFR BLD AUTO: 41.5 %
NITRITE UR QL STRIP.AUTO: NEGATIVE
NRBC BLD-RTO: 0 /100 WBCS (ref 0–0)
PH UR STRIP.AUTO: 5.5 [PH]
PLATELET # BLD AUTO: 314 X10*3/UL (ref 150–450)
POTASSIUM SERPL-SCNC: 4 MMOL/L (ref 3.5–5.3)
POTASSIUM SERPL-SCNC: 4.2 MMOL/L (ref 3.5–5.3)
POTASSIUM SERPL-SCNC: 6.5 MMOL/L (ref 3.5–5.3)
PROT SERPL-MCNC: 7.6 G/DL (ref 6.4–8.2)
PROT UR STRIP.AUTO-MCNC: ABNORMAL MG/DL
RBC # BLD AUTO: 5.06 X10*6/UL (ref 4–5.2)
RBC # UR STRIP.AUTO: NEGATIVE /UL
RBC #/AREA URNS AUTO: ABNORMAL /HPF
SODIUM SERPL-SCNC: 137 MMOL/L (ref 136–145)
SP GR UR STRIP.AUTO: >1.05
SQUAMOUS #/AREA URNS AUTO: ABNORMAL /HPF
UROBILINOGEN UR STRIP.AUTO-MCNC: NORMAL MG/DL
WBC # BLD AUTO: 6.7 X10*3/UL (ref 4.4–11.3)
WBC #/AREA URNS AUTO: ABNORMAL /HPF

## 2024-10-12 PROCEDURE — 2500000004 HC RX 250 GENERAL PHARMACY W/ HCPCS (ALT 636 FOR OP/ED): Performed by: PHYSICIAN ASSISTANT

## 2024-10-12 PROCEDURE — 80053 COMPREHEN METABOLIC PANEL: CPT | Performed by: PHYSICIAN ASSISTANT

## 2024-10-12 PROCEDURE — 74177 CT ABD & PELVIS W/CONTRAST: CPT

## 2024-10-12 PROCEDURE — 84132 ASSAY OF SERUM POTASSIUM: CPT | Performed by: PHYSICIAN ASSISTANT

## 2024-10-12 PROCEDURE — 96374 THER/PROPH/DIAG INJ IV PUSH: CPT | Performed by: EMERGENCY MEDICINE

## 2024-10-12 PROCEDURE — 83690 ASSAY OF LIPASE: CPT | Performed by: PHYSICIAN ASSISTANT

## 2024-10-12 PROCEDURE — 2550000001 HC RX 255 CONTRASTS: Performed by: EMERGENCY MEDICINE

## 2024-10-12 PROCEDURE — 99284 EMERGENCY DEPT VISIT MOD MDM: CPT | Mod: 25 | Performed by: EMERGENCY MEDICINE

## 2024-10-12 PROCEDURE — 84132 ASSAY OF SERUM POTASSIUM: CPT | Mod: 59

## 2024-10-12 PROCEDURE — 85025 COMPLETE CBC W/AUTO DIFF WBC: CPT | Performed by: PHYSICIAN ASSISTANT

## 2024-10-12 PROCEDURE — 81001 URINALYSIS AUTO W/SCOPE: CPT | Performed by: PHYSICIAN ASSISTANT

## 2024-10-12 PROCEDURE — 36415 COLL VENOUS BLD VENIPUNCTURE: CPT

## 2024-10-12 PROCEDURE — 36415 COLL VENOUS BLD VENIPUNCTURE: CPT | Performed by: PHYSICIAN ASSISTANT

## 2024-10-12 RX ORDER — KETOROLAC TROMETHAMINE 15 MG/ML
15 INJECTION, SOLUTION INTRAMUSCULAR; INTRAVENOUS ONCE
Status: COMPLETED | OUTPATIENT
Start: 2024-10-12 | End: 2024-10-12

## 2024-10-12 RX ADMIN — IOHEXOL 80 ML: 350 INJECTION, SOLUTION INTRAVENOUS at 14:27

## 2024-10-12 RX ADMIN — KETOROLAC TROMETHAMINE 15 MG: 15 INJECTION, SOLUTION INTRAMUSCULAR; INTRAVENOUS at 15:19

## 2024-10-12 ASSESSMENT — COLUMBIA-SUICIDE SEVERITY RATING SCALE - C-SSRS
1. IN THE PAST MONTH, HAVE YOU WISHED YOU WERE DEAD OR WISHED YOU COULD GO TO SLEEP AND NOT WAKE UP?: NO
2. HAVE YOU ACTUALLY HAD ANY THOUGHTS OF KILLING YOURSELF?: NO
6. HAVE YOU EVER DONE ANYTHING, STARTED TO DO ANYTHING, OR PREPARED TO DO ANYTHING TO END YOUR LIFE?: NO

## 2024-10-12 ASSESSMENT — PAIN DESCRIPTION - LOCATION: LOCATION: BACK

## 2024-10-12 ASSESSMENT — PAIN - FUNCTIONAL ASSESSMENT: PAIN_FUNCTIONAL_ASSESSMENT: 0-10

## 2024-10-12 ASSESSMENT — PAIN SCALES - GENERAL
PAINLEVEL_OUTOF10: 8
PAINLEVEL_OUTOF10: 10 - WORST POSSIBLE PAIN

## 2024-10-12 NOTE — Clinical Note
Leeann Shankar was seen and treated in our emergency department on 10/12/2024.  She may return to work on 10/13/2024.       If you have any questions or concerns, please don't hesitate to call.      Carlos Farias PA-C

## 2024-10-12 NOTE — DISCHARGE INSTRUCTIONS
You were seen in the emergency department for evaluation of left-sided flank pain.    Your CT scan showed no abnormality.  You could have a kidney stone that was not seen on CT.  If you do should pass in the next couple of days.  You can take Tylenol or ibuprofen as needed for pain.  You understand that you are leaving with preliminary results.  I will call you if anything is positive or changes.    Please follow-up with your primary care provider.  If you do not have a primary care provider you can call [phone] to make an appointment.    Please do not hesitate to return to the ED if symptoms change or worsen.

## 2024-10-12 NOTE — ED PROVIDER NOTES
HPI   Chief Complaint   Patient presents with    Back Pain       HPI  Patient is a 64-year-old female past medical history of DM T1, CHF, hypertension, hyperlipidemia presents to the ED for evaluation of left flank pain x 1 week.  Patient reports worsening of left-sided flank pain over the last 3 days.  She was seen at an urgent care where her urine was negative for infection but positive for glucose.  Pain is described as constant with intermittent episodes of sharp pain.  Her pain is positional.  She denies fever, chills, nausea, vomiting, constipation, diarrhea, dysuria, hematuria, vaginal symptoms, shortness of breath, chest pain.       Patient History   Past Medical History:   Diagnosis Date    Diabetes mellitus (Multi)     Hyperlipidemia     Hypertension      No past surgical history on file.  No family history on file.  Social History     Tobacco Use    Smoking status: Former     Types: Cigarettes    Smokeless tobacco: Never   Substance Use Topics    Alcohol use: Not Currently    Drug use: Not Currently       Physical Exam   ED Triage Vitals [10/12/24 1134]   Temperature Heart Rate Respirations BP   36.6 °C (97.8 °F) 84 16 149/75      Pulse Ox Temp Source Heart Rate Source Patient Position   98 % Oral Monitor --      BP Location FiO2 (%)     -- --       Physical Exam  Vitals reviewed.   Constitutional:       General: She is not in acute distress.     Appearance: Normal appearance. She is not ill-appearing.   HENT:      Head: Normocephalic and atraumatic.      Nose: No rhinorrhea.   Eyes:      General: No scleral icterus.        Right eye: No discharge.         Left eye: No discharge.   Cardiovascular:      Rate and Rhythm: Normal rate and regular rhythm.      Pulses: Normal pulses.      Heart sounds: Normal heart sounds.   Pulmonary:      Effort: Pulmonary effort is normal. No respiratory distress.      Breath sounds: Normal breath sounds. No stridor. No wheezing or rhonchi.   Abdominal:      General: Abdomen  is protuberant.      Palpations: Abdomen is soft.      Tenderness: There is abdominal tenderness in the left upper quadrant. There is left CVA tenderness. There is no right CVA tenderness, guarding or rebound.   Musculoskeletal:      Cervical back: Normal range of motion and neck supple.   Skin:     General: Skin is warm and dry.      Capillary Refill: Capillary refill takes less than 2 seconds.   Neurological:      General: No focal deficit present.      Mental Status: She is alert and oriented to person, place, and time.      Coordination: Coordination normal.   Psychiatric:         Mood and Affect: Mood normal.         Behavior: Behavior normal.           ED Course & MDM   ED Course as of 10/12/24 1845   Sat Oct 12, 2024   1543 POTASSIUM(!!): 6.5 [HK]   1555 CT abdomen pelvis w IV contrast  IMPRESSION:  No acute abnormality within the abdomen or pelvis.   [HK]   1645 POTASSIUM: 4.2 [HK]      ED Course User Index  [HK] Carlos Farias PA-C         Diagnoses as of 10/12/24 1845   Left flank pain                 No data recorded     Kori Coma Scale Score: 15 (10/12/24 1133 : Margret Jay RN)                           Medical Decision Making  Patient is a 64-year-old female past medical history of DM T1, CHF, hypertension, hyperlipidemia presents to the ED for evaluation of left flank pain x 1 week.  On exam patient is uncomfortable appearing and in no acute distress.  Vital signs are stable.  Physical exam significant for left upper quadrant and left CVA tenderness.  Differential includes but is not limited to nephrolithiasis, pyelonephritis, viral enteritis, muscle strain, other acute intra-abdominal etiology.  Staffed with ED attending physician.    Pain treated in ED with IV Toradol.  CBC largely unremarkable.  CMP with hemolyzed potassium of 6.5.  Repeat potassium 4.2.  Lipase 45.  CT abdomen pelvis with no acute process.  Multiple attempts to obtain urine sample.  Obtained and is pending.   Urinalysis is negative for infection.  Positive for glucose.  Patient is on Jardiance and without urinary/vaginal symptoms.    On reevaluation patient reports minimal improvement in her symptoms.  Shared preliminary CT results with patient told her she could have a small stone that the CT did not .  There are no signs of infection or hydronephrosis. Patient does not want to wait for final results and wishes to leave the ER.  Patient understands risks of leaving ER before results are back.    Social determinants of health affecting care:  None     I independently reviewed all imaging and labs.    Patient was informed of the aforementioned findings.  Symptoms are felt to be due to left flank pain.  Advised to take Tylenol or ibuprofen as needed for pain.    At this time, low suspicion for an acute process that would necessitate further emergent workup, urgent specialist consultation, or hospitalization.  Based on clinical workup and discussion with attending physician, the disposition of discharge is appropriate.  Advised patient to pursue close follow-up with PCP.  Patient was provided with appropriate information to assist in obtaining the proper follow-up.  Discussed strict return to ED protocols with patient, including low threshold to return for changing/worsening symptoms.  Patient demonstrated understanding and agreement with the plan of care, and all questions answered prior to discharge.  Patient stable at time of discharge.     --------------------------------------------------------------------------------------------------------------------------------------------------------------------------------------------------------------------------    This note was dictated using a speech recognition program.  While an attempt was made at proof reading to minimize errors, minor errors in transcription may be present call for questions.     Procedure  Procedures     Carlos Farias PA-C  10/12/24 9018

## 2024-10-12 NOTE — ED TRIAGE NOTES
"Pt c/o sharp pain from L flank to low back. Pt states \"I went to urgent care a couple of days ago, I thought it was a UTI but it was not a UTI.\" Pt states pain started x 1 week ago. Pt denies any injury/falls. Pt states she was sent home with tylenol with no relief. Pt denies radiation or numbness/tingling, abd complaints.   " [address] [place] [address]  Dept: [phone]  Dept Fax: [place]: [phone]    Randolph Kapoor is a 4 y.o. female who presents today for her medical conditions/complaints as noted below. Randolph Kapoor is complaining of Conjunctivitis (Bilateral )        HPI:   Pt presents with her mother; c/o right eye redness and eyelid redness and swelling since this morning. Admits some drainage from the right eye. Mother denies congestion, cough, fever, body aches, chills. No meds for this. Alleviating factors include warm compresses. S/s moderate. Stable    Conjunctivitis   Associated symptoms include eye discharge (right) and eye redness (right). Pertinent negatives include no fever, no constipation, no diarrhea, no vomiting, no congestion, no ear discharge, no ear pain, no rhinorrhea, no sore throat, no cough, no wheezing and no rash. History reviewed. No pertinent past medical history. No past surgical history on file. No family history on file.     Social History     Tobacco Use    Smoking status: Never    Smokeless tobacco: Never   Substance Use Topics    Alcohol use: Not on file        Current Outpatient Medications   Medication Sig Dispense Refill    tobramycin-dexamethasone (TOBRADEX) 0.3-0.1 % ophthalmic suspension Place 1 drop into both eyes every 4 hours (while awake) for 7 days 1 each 1    fluticasone (FLONASE) 50 MCG/ACT nasal spray 1 spray by Each Nostril route daily 32 g 11    cetirizine HCl (ZYRTEC CHILDRENS ALLERGY) 5 MG/5ML SOLN Take 2.5 mLs by mouth daily 120 mL 11    albuterol (PROVENTIL) (2.5 MG/3ML) 0.083% nebulizer solution Take 3 mLs by nebulization every 4 hours as needed for Wheezing 3 boxes 60 mL 1    ondansetron (ZOFRAN) 4 MG/5ML solution Take 1.5 mLs by mouth every 6-8 hours as needed for Nausea or Vomiting 50 mL 0    Nutritional Supplements (PEDIASURE) LIQD 1-2 a day

## 2024-10-13 LAB — HOLD SPECIMEN: NORMAL

## 2024-10-14 LAB — ANA SER QL HEP2 SUBST: NEGATIVE

## 2024-10-17 ENCOUNTER — TELEPHONE (OUTPATIENT)
Dept: CARDIAC REHAB | Facility: HOSPITAL | Age: 64
End: 2024-10-17

## 2024-10-17 NOTE — TELEPHONE ENCOUNTER
PATIENT: Leeann Shankar  DATE: 10/17/2024    Called patient regarding scheduling Cardiac Rehab at [facility].   She was scheduled for program intake evaluation on 11/5 at 3:00 PM.     Katharine Venegas RN

## 2024-11-05 ENCOUNTER — CLINICAL SUPPORT (OUTPATIENT)
Dept: CARDIAC REHAB | Facility: HOSPITAL | Age: 64
End: 2024-11-05
Payer: COMMERCIAL

## 2024-11-05 VITALS
DIASTOLIC BLOOD PRESSURE: 72 MMHG | OXYGEN SATURATION: 97 % | HEART RATE: 84 BPM | SYSTOLIC BLOOD PRESSURE: 128 MMHG | BODY MASS INDEX: 44.66 KG/M2 | WEIGHT: 244.2 LBS

## 2024-11-05 DIAGNOSIS — I11.0 HYPERTENSIVE HEART DISEASE WITH HEART FAILURE: ICD-10-CM

## 2024-11-05 RX ORDER — MIRTAZAPINE 15 MG/1
1 TABLET, FILM COATED ORAL NIGHTLY
COMMUNITY
Start: 2024-08-13

## 2024-11-05 RX ORDER — LIDOCAINE HCL 4 G/100G
CREAM TOPICAL
COMMUNITY
Start: 2024-10-10

## 2024-11-05 RX ORDER — TIRZEPATIDE 12.5 MG/.5ML
INJECTION, SOLUTION SUBCUTANEOUS
COMMUNITY
Start: 2024-11-04

## 2024-11-05 RX ORDER — TRIAMTERENE/HYDROCHLOROTHIAZID 37.5-25 MG
TABLET ORAL
COMMUNITY
Start: 2024-08-13

## 2024-11-05 RX ORDER — DICLOFENAC SODIUM 10 MG/G
4 GEL TOPICAL
COMMUNITY
Start: 2024-10-10

## 2024-11-05 RX ORDER — NITROFURANTOIN 25; 75 MG/1; MG/1
CAPSULE ORAL
COMMUNITY
Start: 2024-09-20

## 2024-11-05 RX ORDER — TIZANIDINE 4 MG/1
4 TABLET ORAL
COMMUNITY
Start: 2024-10-10

## 2024-11-05 RX ORDER — LIDOCAINE 40 MG/G
CREAM TOPICAL
COMMUNITY
Start: 2024-10-24

## 2024-11-05 RX ORDER — HYDROCHLOROTHIAZIDE 25 MG/1
12.5 TABLET ORAL
COMMUNITY
Start: 2024-03-03 | End: 2024-11-08 | Stop reason: ALTCHOICE

## 2024-11-05 ASSESSMENT — PATIENT HEALTH QUESTIONNAIRE - PHQ9
SUM OF ALL RESPONSES TO PHQ QUESTIONS 1-9: 7
4. FEELING TIRED OR HAVING LITTLE ENERGY: SEVERAL DAYS
SUM OF ALL RESPONSES TO PHQ QUESTIONS 1-9: 7
3. TROUBLE FALLING OR STAYING ASLEEP OR SLEEPING TOO MUCH: NEARLY EVERY DAY
5. POOR APPETITE OR OVEREATING: NOT AT ALL
SUM OF ALL RESPONSES TO PHQ9 QUESTIONS 1 & 2: 0
9. THOUGHTS THAT YOU WOULD BE BETTER OFF DEAD, OR OF HURTING YOURSELF: NOT AT ALL
1. LITTLE INTEREST OR PLEASURE IN DOING THINGS: NOT AT ALL
6. FEELING BAD ABOUT YOURSELF - OR THAT YOU ARE A FAILURE OR HAVE LET YOURSELF OR YOUR FAMILY DOWN: NOT AT ALL
2. FEELING DOWN, DEPRESSED OR HOPELESS: NOT AT ALL
8. MOVING OR SPEAKING SO SLOWLY THAT OTHER PEOPLE COULD HAVE NOTICED. OR THE OPPOSITE, BEING SO FIGETY OR RESTLESS THAT YOU HAVE BEEN MOVING AROUND A LOT MORE THAN USUAL: NEARLY EVERY DAY
7. TROUBLE CONCENTRATING ON THINGS, SUCH AS READING THE NEWSPAPER OR WATCHING TELEVISION: NOT AT ALL

## 2024-11-05 ASSESSMENT — DUKE ACTIVITY SCORE INDEX (DASI)
CAN YOU RUN A SHORT DISTANCE: NO
CAN YOU HAVE SEXUAL RELATIONS: NO
DASI METS SCORE: 3.6
CAN YOU DO LIGHT WORK AROUND THE HOUSE LIKE DUSTING OR WASHING DISHES: YES
CAN YOU CLIMB A FLIGHT OF STAIRS OR WALK UP A HILL: NO
CAN YOU DO MODERATE WORK AROUND THE HOUSE LIKE VACUUMING, SWEEPING FLOORS OR CARRYING GROCERIES: NO
CAN YOU PARTICIPATE IN MODERATE RECREATIONAL ACTIVITIES LIKE GOLF, BOWLING, DANCING, DOUBLES TENNIS OR THROWING A BASEBALL OR FOOTBALL: NO
CAN YOU WALK A BLOCK OR TWO ON LEVEL GROUND: NO
CAN YOU DO HEAVY WORK AROUND THE HOUSE LIKE SCRUBBING FLOORS OR LIFTING AND MOVING HEAVY FURNITURE: NO
CAN YOU DO YARD WORK LIKE RAKING LEAVES, WEEDING OR PUSHING A MOWER: NO
CAN YOU WALK INDOORS, SUCH AS AROUND YOUR HOUSE: YES
CAN YOU PARTICIPATE IN STRENOUS SPORTS LIKE SWIMMING, SINGLES TENNIS, FOOTBALL, BASKETBALL, OR SKIING: NO
TOTAL_SCORE: 7.2
CAN YOU TAKE CARE OF YOURSELF (EAT, DRESS, BATHE, OR USE TOILET): YES

## 2024-11-05 NOTE — PROGRESS NOTES
Cardiac Rehabilitation Initial Treatment Plan    Name: Leeann Shankar   Medical Record Number: [id number]  YOB: 1960   Age: 64 y.o.  Today’s Date: 11/5/2024   Primary Care Physician: No primary care provider on file.   Referring Physician: Georgette Ortiz MD*   Program Location: [address]        General  Primary Diagnosis:   1. Hypertensive heart disease with heart failure  Referral to Cardiac Rehab    Follow Up In Cardiac Rehab         Onset/Date of Diagnosis: 03/04/24   Session #: 0  AACVPR Risk Stratification: High   Falls Risk: Low    Psychosocial Initial Assessment:   Pre PHQ-9: 7  Pre PHQ-9: 7     Sent PH-Q 9 to MD if score > 20: No; score < 20    Pt reported/currently experiencing stress: Yes; Anxiety; Severity: moderate and Depression; Severity: moderate  Patient uses stress management skills: Yes , prayer  History of:  anxiety and depression  Currently seeing a mental health provider: No  Social Support: Yes, Whom:daughter (aid), kids, grandchildren  Quality of Life Survey: KCCQ (see Heart Failure Management below)    Learning Assessment:  Learning assessment/barriers: Work  Preferred learning method: Auditory, Visual, Reading handout, and Writing handout  Barriers: None  Comments:    Stages of Change: Contemplation    Psychosocial Plan  Goal Status: Initial Assessment; goals not yet started  Psychosocial Goals: Demonstrating proper techniques for stress management, Maintain or lower PH-Q 9 score by discharge, and Identify strategies for managing depression    Psychosocial Interventions/Education:   To be done in Cardiac Rehab.    Nutrition Initial Assessment:    Diet Habit Survey: Picture Your Plate  Pre: Survey to be given during initial exercise session.  Post: To be done at discharge.    Survey results reviewed with Dietician: Not at this time    Lipids Assessment  Current Dietary Guidelines:  Regular  Barriers to dietary change: no  Hyperlipidemia: Yes , takes  atorvastatin  Lab Results   Component Value Date    CHOL 127 10/11/2024    HDL 45.0 10/11/2024    LDLCALC 69 10/11/2024    TRIG 67 10/11/2024     Diabetes Assessment  History of Diabetes: Yes Pt monitors BS at home: Yes   Frequency: every other /day  FBS range: < 150  Hypoglycemic Episodes: No   Lab Results   Component Value Date    HGBA1C 8.1 (H) 08/13/2024      Weight Management  Weight: 111 kg (244 lb 3.2 oz)     Nutrition Plan  Goal Status: Initial Assessment; goals not yet started  Nutrition Goals: Improve Diet Habit Survey score by 5-10 points by discharge, Adapt a low-sodium, DASH diet prior to discharge, Adapt a Mediterranean focused diet prior to discharge, Learn how to read and interpret nutrition labels prior to discharge, Lose 1lb/week while enrolled in program, Improve HgbA1C prior to discharge, Check blood glucose daily, and Verbalize S/S of hypoglycemia or hyperglycemia by discharge    Nutrition Interventions/Education:   To be done in Cardiac Rehab.    Exercise Initial Assessment:  Current Home Exercise: Has full gym at home in basement (bike, treadmill, row machine, and weights); also swims but has not gone for the past month  Current Home Exercise?: No  Mode: NA  Frequency: NA  Duration: NA     Exercise Prescription  Exercise Prescription based on:   [facility] Activity Status Index (DASI)  DASI Score: 7.2   MET Score: 3.6     Patient health history  Frequency:  2 days/week  Mode: Treadmill, NuStep, and Recumbent Cycle  Duration: 24 total aerobic minutes  Intensity: RPE 12-16  Target HR:  To be calculated after 6 attended sessions.  MET Level: 2.6  Patient wears supplemental O2: No   Modality Workload METs Duration (minutes)   1 Pre-Exercise      2 Session Warm Up   5 : 00   3 Treadmill 2.0 mph @ 0 % 2.6 8 : 00   4 NuStep 4000 3 load @ 52 treadwell 2.6 8 : 00   5 Recumbent Bike 2 load @ 55 rpms 2.2 8 : 00   6 Post-Exercise         Resistance Training: No   Home Exercise Prescription given: To be given prior  to discharge from program.    Exercise Plan  Goal Status: Initial Assessment; goals not yet started  Exercise Goals: Increase exercise MET level by 5-10% each week, Increase total exercise duration to 30-45 minutes, Obtain 150 minutes/week of moderate intensity aerobic exercise, Initiate strength training 2-3 days a week, Initiate flexibility training 2-3 days a week, and Establish a home exercise program before discharge    Exercise Interventions/Education:   To be done in Cardiac Rehab.    Other Core Components/Risk Factor Initial Assessment:  Medication adherence  Medication compliance: Yes  Uses pill box/organizer: Yes   Carries medication list: Yes   Current Medications:   Medication Documentation Review Audit       Reviewed by Katharine Venegas RN (Registered Nurse) on 11/05/24 at 1352      Medication Order Taking? Sig Documenting Provider Last Dose Status   atorvastatin (Lipitor) 80 mg tablet [id number] Yes Take 1 tablet (80 mg) by mouth once daily. Historical Provider, MD Taking Active   baclofen (Lioresal) 20 mg tablet [id number] Yes Take 1 tablet (20 mg) by mouth 3 times a day. Stop methocarbamol Historical Provider, MD Taking Active   blood sugar diagnostic strip [id number] Yes Use as instructed Historical Provider, MD Taking Active   dapagliflozin propanediol (Farxiga) 10 mg [id number] Yes Take 1 tablet (10 mg) by mouth once daily. Georgette Ortiz MD PhD  Active     Discontinued 03/04/24 2219   metoprolol succinate XL (Toprol-XL) 100 mg 24 hr tablet [id number] Yes Take 1 tablet (100 mg) by mouth once daily. Do not crush or chew. Georgette Ortiz MD PhD  Active   Mounjaro 10 mg/0.5 mL pen injector [id number] Yes Inject 10 mg under the skin 1 (one) time per week. Historical Provider, MD Taking Active   OneTouch Delica Plus Lancet 33 gauge misc [id number] Yes  Historical Provider, MD Taking Active   OneTouch Ultra2 Meter misc [id number] Yes  Historical Provider, MD Taking Active   perflutren lipid microspheres  (Definity) injection 10 mL of dilution [id number]   Moy [place] MD  Active   polyethylene glycol (Glycolax, Miralax) 17 gram packet [id number] Yes Take 17 g by mouth once daily. Historical Provider, MD Taking Active   sacubitriL-valsartan (Entresto) 24-26 mg tablet [id number] Yes Take 1 tablet by mouth 2 times a day. Georgette Ortiz MD PhD  Active   spironolactone (Aldactone) 25 mg tablet [id number] Yes Take 1 tablet (25 mg) by mouth once daily. Georgette Ortiz MD PhD  Active   topiramate (Topamax) 50 mg tablet [id number] Yes Take 1 tablet (50 mg) by mouth 2 times a day. Historical Provider, MD Taking Active                   Is patient prescribed Nitroglycerin? No  Blood Pressure Management  History of Hypertension: Yes   Medication Changes: No   Resting BP:  Visit Vitals  /72   Pulse 84       Heart Failure Management  Hx of Heart Failure: Yes, Type (selection): HFrEF  Most recent EF: Echocardiogram - LVEF (%): 30     Onset of heart failure diagnosis: 3/2024  Last heart failure hospitalization: 3/3/24  Number of HF admissions per year: 1    Symptoms: Fatigue with exertion, Shortness of breath, and Orthopnea (sleeps on stomach)  Is there a family Hx of HF: Yes   Does patient obtain daily weight No , has scale     KCCQ survey: Pre: 76.88  Post: To be done at discharge    Heart Failure Reassessment Heart Failure Symptom Management: Initial Treatment Plan. Will reassess in 30 days.    Heart Failure Goals: Able to verbalize signs and symptoms of fluid retention and when to contact MD, Adhere to proper fluid restrictions, Adapt a low sodium diet and verbalize guidelines, Obtain daily weight and verbalize proper method of obtaining weights    Smoking/Tobacco Assessment  Social History     Tobacco Use    Smoking status: Former     Types: Cigarettes    Smokeless tobacco: Never   Substance Use Topics    Alcohol use: Not Currently    Drug use: Not Currently      Other Core Component Plan  Goal Status: Initial  Assessment; goals not yet started  Other Core Component Goals: Verbalize medication usage and drug actions by discharge and Achieve resting BP of < 130/80 by discharge    Other Core Component Interventions/Education:   To be done in Cardiac Rehab.    Individual Patient Goals:  Resume swimming by discharge.  Establish home exercise regimen by discharge from program.  Goal Status: Initial Assessment; goals not yet started    Staff Comments:  Initial assessment done In Person at [facility]. Patient plans to attend initial session 11/12/24 at 2:00 PM.  Due to work schedule, patient plans to attend 2 days a week on Tuesdays and Thursdays.      Rehab Staff Signature: Katharine Venegas RN

## 2024-11-07 ENCOUNTER — TELEPHONE (OUTPATIENT)
Dept: CARDIOLOGY | Facility: HOSPITAL | Age: 64
End: 2024-11-07

## 2024-11-08 ENCOUNTER — OFFICE VISIT (OUTPATIENT)
Dept: CARDIOLOGY | Facility: HOSPITAL | Age: 64
End: 2024-11-08
Payer: COMMERCIAL

## 2024-11-08 VITALS
HEIGHT: 62 IN | OXYGEN SATURATION: 100 % | BODY MASS INDEX: 44.53 KG/M2 | DIASTOLIC BLOOD PRESSURE: 84 MMHG | HEART RATE: 94 BPM | SYSTOLIC BLOOD PRESSURE: 137 MMHG | WEIGHT: 242 LBS

## 2024-11-08 DIAGNOSIS — I50.20 HFREF (HEART FAILURE WITH REDUCED EJECTION FRACTION): ICD-10-CM

## 2024-11-08 DIAGNOSIS — I15.8 OTHER SECONDARY HYPERTENSION: Primary | ICD-10-CM

## 2024-11-08 PROCEDURE — 3048F LDL-C <100 MG/DL: CPT | Performed by: STUDENT IN AN ORGANIZED HEALTH CARE EDUCATION/TRAINING PROGRAM

## 2024-11-08 PROCEDURE — 99215 OFFICE O/P EST HI 40 MIN: CPT | Performed by: STUDENT IN AN ORGANIZED HEALTH CARE EDUCATION/TRAINING PROGRAM

## 2024-11-08 PROCEDURE — 3008F BODY MASS INDEX DOCD: CPT | Performed by: STUDENT IN AN ORGANIZED HEALTH CARE EDUCATION/TRAINING PROGRAM

## 2024-11-08 PROCEDURE — 3075F SYST BP GE 130 - 139MM HG: CPT | Performed by: STUDENT IN AN ORGANIZED HEALTH CARE EDUCATION/TRAINING PROGRAM

## 2024-11-08 PROCEDURE — 3079F DIAST BP 80-89 MM HG: CPT | Performed by: STUDENT IN AN ORGANIZED HEALTH CARE EDUCATION/TRAINING PROGRAM

## 2024-11-08 PROCEDURE — 1036F TOBACCO NON-USER: CPT | Performed by: STUDENT IN AN ORGANIZED HEALTH CARE EDUCATION/TRAINING PROGRAM

## 2024-11-08 RX ORDER — ACETAMINOPHEN 500 MG
TABLET ORAL
Qty: 1 KIT | Refills: 0 | Status: SHIPPED
Start: 2024-11-08 | End: 2024-11-08

## 2024-11-08 RX ORDER — ACETAMINOPHEN 500 MG
TABLET ORAL
Qty: 1 KIT | Refills: 0 | Status: SHIPPED | OUTPATIENT
Start: 2024-11-08

## 2024-11-08 ASSESSMENT — ENCOUNTER SYMPTOMS
GASTROINTESTINAL NEGATIVE: 1
CARDIOVASCULAR NEGATIVE: 1
RESPIRATORY NEGATIVE: 1
CONSTITUTIONAL NEGATIVE: 1
NEUROLOGICAL NEGATIVE: 1

## 2024-11-08 NOTE — PROGRESS NOTES
"Referring Clinician:   Primary cardiologist: Dr. 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  Review of the electronic medical record shows a past medical history significant for hypertension, OA, hyperlipidemia, asthma, diabetes mellitus, obesity with BMI~44 kg/m².  She presented to [facility] 3/2024 with dyspnea and was found to have elevated natruretic peptides, and depressed ejection fraction.  On TTE 4/2024 LVEF 30-35% LVIDD 5.7 cm.  There is a documented allergy to lisinopril (cough), she confirmed 9/27/2024 that she only had a cough with lisinopril.  At last visit HF GDMT adjustment commenced    Symptomatically, there is no chest pain, no SOB at rest, mild SOBOE, PND a few nights a week, 1 pillow orthopnoea,  no further  chronic leg  swelling ( wears compresison stockings), no palpitations, no syncope, no pre syncope.    She is enrolled in the weight loss program and is an active participant.    Functionally, her exercise capacity is described as baseline - can walk in [employer] can climb a flight of stairs and walk a city block.  Moreover, she swims twice a week for up to 3 hours without major exertional symptoms.  Her knees and hip pain limit her mobility mostly.  She is due to start rehab 11/12/2024  She concedes that she \"love salty food, fast food and all of that\". Since last visit she has been doing better in this regard    She is strictly adherent with all prescribed medications    Her last HF hospitalisation was 3/2024    Review of Systems   Constitutional: Negative.   Cardiovascular: Negative.    Respiratory: Negative.     Gastrointestinal: Negative.    Genitourinary: Negative.    Neurological: Negative.       Surgical Hx:  - C sections x 4    Past Obstetric Hx:  - G4  - No known cardiac complications of pregnancy    Social Hx:  - Smoking- quit 2021, prev smoked from age " "[age] years 1/2 PPD  - ETOH- never a heavy drinker, nil now  - Illicit drugs-Last crack ~ 1980  - Lives alone, grandson visits often. Feels safe at home      Family Hx:  Specifically, there is no family history of  CAD, heart failure, ICD, PPM, OHT, arrhythmias, CVA, or sudden cardiac death.    Sister-heart disease? Dx  Niece- \"2 blood clots in her heart and fluid to her hips\"  - Cousin- ? Heart pump  Brother--heart disease ? Dx     Medication reconciliation completed, see below.     Medication Documentation Review Audit       Reviewed by Tricia Duffy RN (Registered Nurse) on 11/08/24 at 1306      Medication Order Taking? Sig Documenting Provider Last Dose Status   atorvastatin (Lipitor) 80 mg tablet [id number] Yes Take 1 tablet (80 mg) by mouth once daily. Sea Starr MD Taking Active   baclofen (Lioresal) 20 mg tablet [id number] No Take 1 tablet (20 mg) by mouth 3 times a day. Stop methocarbamol   Patient not taking: Reported on 11/8/2024    Historical MD Matty Taking Active   blood sugar diagnostic strip [id number] Yes Use as instructed Historical Provider, MD Taking Active   dapagliflozin propanediol (Farxiga) 10 mg [id number] Yes Take 1 tablet (10 mg) by mouth once daily. Georgette Ortiz MD PhD  Active   diclofenac sodium (Voltaren) 1 % gel [id number] Yes Apply 4.5 inches (4 g) topically. Historical MD Matty  Active   hydroCHLOROthiazide (HYDRODiuril) 25 mg tablet [id number]  Take 0.5 tablets (12.5 mg) by mouth once daily.   Patient not taking: Reported on 11/8/2024    Historical MD Matty  Active   lidocaine (lidocaine HCL) 4 % cream [id number] Yes Apply thin layer to affected area every 4 hours as needed for pain Historical Provider, MD  Active   lidocaine 4 % cream [id number] Yes  Historical Provider, MD  Active     Discontinued 03/04/24 2219   metoprolol succinate XL (Toprol-XL) 100 mg 24 hr tablet [id number] Yes Take 1 tablet (100 mg) by mouth once daily. Do not crush or chew. " "Georgette Ortiz MD PhD  Active   mirtazapine (Remeron) 15 mg tablet [id number] Yes Take 1 tablet (15 mg) by mouth once daily at bedtime. Historical Provider, MD  Active   Discontinued 11/05/24 1558   Mounjaro 12.5 mg/0.5 mL pen injector [id number] Yes  Historical Provider, MD  Active   nitrofurantoin, macrocrystal-monohydrate, (Macrobid) 100 mg capsule [id number] Yes  Historical Provider, MD  Active   OneTouch Delica Plus Lancet 33 gauge misc [id number] Yes  Historical Provider, MD Taking Active   OneTouch Ultra2 Meter misc [id number] Yes  Historical Provider, MD Taking Active   perflutren lipid microspheres (Definity) injection 10 mL of dilution [id number]   Moy East MD  Active   polyethylene glycol (Glycolax, Miralax) 17 gram packet [id number] Yes Take 17 g by mouth once daily. Historical Provider, MD Taking Active   sacubitriL-valsartan (Entresto) 24-26 mg tablet [id number] Yes Take 1 tablet by mouth 2 times a day. Georgette Ortiz MD PhD  Active   spironolactone (Aldactone) 25 mg tablet [id number] Yes Take 1 tablet (25 mg) by mouth once daily. Georgette Ortiz MD PhD  Active   tiZANidine (Zanaflex) 4 mg tablet [id number] Yes Take 1 tablet (4 mg) by mouth. Historical MD Matty  Active   topiramate (Topamax) 50 mg tablet [id number] Yes Take 1 tablet (50 mg) by mouth 2 times a day. Historical Provider, MD Taking Active   triamterene-hydrochlorothiazid (Maxzide-25) 37.5-25 mg tablet [id number]     Patient not taking: Reported on 11/8/2024    Historical Provider, MD  Active                   Allergies   Allergen Reactions   • Lisinopril Cough        Investigations:    The electronic medical record has been reviewed by me for salient history. All cardiovascular imaging and testing available in the electronic medical record, and [employer] has been reviewed.    Visit Vitals  /84   Pulse 94   Ht 1.575 m (5' 2\")   Wt 110 kg (242 lb)   SpO2 100%   BMI 44.26 kg/m²   Smoking Status Former   BSA 2.19 m²         On " examination:    Very pleasant obese middle aged AA woman in no apparent CP or painful distress  Well  groomed   Neck: No JVD or HJR  CVS: HS 1,2.   No added sounds  Resp: CTA bilaterally. Percussion note resonant  Abdomen: Obese,SNT, BS wnl  Extremities: No pedal oedema  Skin: warm and dry  CNS: AO x 4, no gross deficits    Lab Results   Component Value Date    WBC 6.7 10/12/2024    HGB 12.5 10/12/2024    HCT 41.1 10/12/2024    MCV 81 10/12/2024     10/12/2024       Chemistry    Lab Results   Component Value Date/Time     10/12/2024 1340    K 4.0 10/12/2024 1606     10/12/2024 1340    CO2 30 10/12/2024 1340    BUN 16 10/12/2024 1340    CREATININE 1.01 10/12/2024 1340    Lab Results   Component Value Date/Time    CALCIUM 9.5 10/12/2024 1340    ALKPHOS 127 10/12/2024 1340    AST 42 (H) 10/12/2024 1340    ALT 27 10/12/2024 1340    BILITOT 0.5 10/12/2024 1340           Transthoracic echo (TTE) complete    Result Date: 4/30/2024   [facility], [address] [place] [address]                Tel [phone] and Fax [phone] TRANSTHORACIC ECHOCARDIOGRAM REPORT  Patient Name:      FLAVIA Freeman Physician:    [id number] Rodolfo Long MD Study Date:        4/30/2024            Ordering Provider:    [id number] DECLAN GRIJALVA MRN/PID:           [id number]             Fellow: Accession#:        [id number]         Nurse:                Cori Coker RN Date of Birth/Age: 1960 / 63 years Sonographer:          Lucille Calderon RDCS Gender:            F                    Additional Staff: Height:            157.48 cm            Admit Date: Weight:            109.32 kg            Admission Status:     Outpatient BSA / BMI:          2.07 m2 / 44.08      Encounter#:           [id number]                    kg/m2                                         Department Location:  [facility] Non                                                               Invasive Blood Pressure: 131 /77 mmHg Study Type:    TRANSTHORACIC ECHO (TTE) COMPLETE Diagnosis/ICD: Heart failure, unspecified-I50.9; Hypertensive heart disease with                heart failure-I11.0 Indication:    New onset of congestive heart failure; hypertensive heart disease                with heart failure CPT Code:      Echo Complete w Full Doppler-94763 Patient History: Pertinent History: HTN, DM, DLD, asthma. Study Detail: The following Echo studies were performed: 2D, M-Mode, color flow               and Doppler. Technically challenging study due to body habitus.               Definity used as a contrast agent for endocardial border               definition. Total contrast used for this procedure was 2.0 mL via               IV push.  PHYSICIAN INTERPRETATION: Left Ventricle: The left ventricular systolic function is moderately decreased, with an estimated ejection fraction of 30-35%. There is global hypokinesis of the left ventricle with minor regional variations. The left ventricular cavity size is mildly dilated. There is mild eccentric left ventricular hypertrophy. Spectral Doppler shows an impaired relaxation pattern of left ventricular diastolic filling. Left Atrium: The left atrium is normal in size. Right Ventricle: The right ventricle is normal in size. There is normal right ventricular global systolic function. Right Atrium: The right atrium is normal in size. Aortic Valve: The aortic valve is trileaflet. There is trivial aortic valve regurgitation. The peak instantaneous gradient of the aortic valve is 4.2 mmHg. Mitral Valve: The mitral valve is normal in structure. There is trace mitral valve regurgitation. Tricuspid Valve: The tricuspid valve is structurally normal. There  is trace tricuspid regurgitation. The right ventricular systolic pressure is unable to be estimated. Pulmonic Valve: The pulmonic valve is structurally normal. The pulmonic valve regurgitation was not well visualized. Pericardium: There is no pericardial effusion noted. Aorta: The aortic root is normal. Systemic Veins: The inferior vena cava appears to be of normal size. In comparison to the previous echocardiogram(s): There are no prior studies on this patient for comparison purposes.  CONCLUSIONS:  1. Left ventricular systolic function is moderately decreased with a 30-35% estimated ejection fraction.  2. Poorly visualized anatomical structures due to suboptimal image quality.  3. Spectral Doppler shows an impaired relaxation pattern of left ventricular diastolic filling.  4. There is mild eccentric left ventricular hypertrophy.  5. There is global hypokinesis of the left ventricle with minor regional variations. QUANTITATIVE DATA SUMMARY: 2D MEASUREMENTS:                          Normal Ranges: Ao Root d:     2.80 cm   (2.0-3.7cm) LAs:           3.20 cm   (2.7-4.0cm) IVSd:          1.00 cm   (0.6-1.1cm) LVPWd:         0.80 cm   (0.6-1.1cm) LVIDd:         5.70 cm   (3.9-5.9cm) LVIDs:         4.10 cm LV Mass Index: 95.5 g/m2 LV % FS        28.1 % AORTA MEASUREMENTS:                    Normal Ranges: Asc Ao, d: 2.60 cm (2.1-3.4cm) LV SYSTOLIC FUNCTION BY 2D PLANIMETRY (MOD):                     Normal Ranges: EF-A4C View: 26.1 % (>=55%) EF-A2C View: 38.6 % EF-Biplane:  31.3 % AORTIC VALVE:                         Normal Ranges: AoV Vmax:      1.02 m/s (<=1.7m/s) AoV Peak P.2 mmHg (<20mmHg) LVOT Max Alejandro:  0.99 m/s (<=1.1m/s) LVOT VTI:      20.20 cm LVOT Diameter: 1.80 cm  (1.8-2.4cm) AoV Area,Vmax: 2.47 cm2 (2.5-4.5cm2)  RIGHT VENTRICLE: TAPSE: 20.3 mm RV s'  0.13 m/s TRICUSPID VALVE/RVSP:                   Normal Ranges: IVC Diam: 1.40 cm PULMONIC VALVE:                         Normal Ranges: PV Accel Time: 79  msec  (>120ms) PV Max Alejandro:    0.7 m/s  (0.6-0.9m/s) PV Max P.8 mmHg  [id number] Rodolfo Long MD Electronically signed on 2024 at 11:57:59 AM  ** Final **     Echocardiogram stress test    Result Date: 3/5/2024    [facility], [address] [place] [address]                Tel [phone] and Fax [phone]  Dobutamine Stress Echo Patient Name:     OMARI OSEI        Ordering Provider:    [id number] ABDELRAHMAN REINA RODRIGUES Study Date:       3/4/2024            Reading Physician:    [id number] Escobar MD MRN/PID:          [id number]            Supervising           [id number] Karri Escobar                                       Physician:            MD Accession#:       [id number]        Fellow:               [id number] Moy East MD Date of           1960      Fellow: Birth/Age:        years Gender:           F                   Nurse:                Sofia Hernandez RN Admission Status: Inpatient           Sonographer:          ADM Height:           157.48 cm           Technologist: Weight:           117.03 kg           Additional Staff: BSA:              2.13 m2             Encounter#:           [id number] BMI:              47.19 kg/m2         Patient Location: Study Type:    ECHOCARDIOGRAM STRESS TEST Diagnosis/ICD: Other forms of dyspnea-R06.09 Indication:    Dyspnea on Exertion CPT Code:      Stress Echo-32505; Stress Test Interpretation-65839; Stress Test                Supervision-[id number] Falls Risk:  Study Details: Correct procedure and correct patient verified verbally and with                ID Band checked.  Patient History: Hypertension, dyslipidemia and dyspnea. Allergies: Lisinopril-cough. Smoker:    Former. Diabetes:  Yes.  Medications: The patient's prescribed medication is HCTZ, metoprolol. The patient took medications as prescribed.  Patient Performance: The patient  received 30 mcg/kg/min of Dobutamine and a total dose of 0.25 mg of atropine in divided doses of 0.25 mg at 11:20:28 AM. The resting blood pressure was 166/97 mmHg with a heart rate of 90 bpm. The patient developed no symptoms during the stress exam. The blood pressure response was normal. The test was terminated due to: MPHR >85%. Patient has met the discharge criteria and is discharged to their floor.  Baseline ECG: Resting ECG showed normal sinus rhythm.  Stress ECG: Stress ECG showed sinus tachycardia. No ST changes.  Stress Stage Data: +---+------+-------+---------------------------------------+ HR Sys BPDias BPComments                                +---+------+-------+---------------------------------------+ 90 166   97                                             +---+------+-------+---------------------------------------+ 87 169   89     Dobutamine @ 5 mcg/kg/min. No symptoms  +---+------+-------+---------------------------------------+ 98 185   79     Dobutamine @ 10mcg/kg/min. No symptoms  +---+------+-------+---------------------------------------+ 129383   80     Dobutamine @ 20 mcg/kg/min. No symptoms +---+------+-------+---------------------------------------+ [id number]   73     Dobutamine @ 30 mcg/kg/min. No symptoms +---+------+-------+---------------------------------------+  Recovery ECG: Recovery ECG showed normal sinus rhythm, with rare PVC.  +------------+---+------+-------+----------------------+             HR Sys BPDias BPComments               +------------+---+------+-------+----------------------+ Recovery I  129             1 minute. No sympoms   +------------+---+------+-------+----------------------+ Recovery II [id number]   74     2 minutes. No symptoms +------------+---+------+-------+----------------------+ Recovery [id number]   73     4 minutes. No symptoms +------------+---+------+-------+----------------------+ Recovery IV 96 153    82     6 minutes. No symtoms  +------------+---+------+-------+----------------------+  Baseline Echo: Global LV systolic function is mildly decreased. The resting baseline ejection fraction was estimated at 45 to 50%. There are no regional wall motion abnormalities at baseline.  Stress Echo: There are no stress induced regional wall motion abnormalities. The ejection fraction is approximately 60 to 65% at peak stress. The left ventricular internal cavity size at peak stress is decreased. At peak stress, the global LV systolic function is increased.  Summary:  1. The resting ejection fraction was estimated at 45 to 50% with a peak exercise ejection fraction estimated at 60 to 65%.  2. Mildly decreased global left ventricular systolic function.  3. Adequate level of stress achieved.  4. No clinical, echocardiographic or electrocardiographic evidence for ischemia at a maximal infusion.  5. No stress induced ischemia, however resting EF is reduced. Suggest full echo.  6. There were no stress-induced wall motion abnormalities. This is a negative stress echo test for ischemia. 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  My final recommendations will be communicated back to the requesting clinician  by way of shared Medical record.   Review of the electronic medical record shows a past medical history significant for hypertension, OA, hyperlipidemia, asthma, diabetes mellitus, obesity with BMI~45 kg/m².  She presented to [facility] 3/2024 with dyspnea and was found to have elevated natruretic peptides, and depressed ejection fraction.  On TTE 4/2024 LVEF 30-35% LVIDD 5.7 cm.  For HFrEF she is currently maintained on losartan.  There is a documented allergy to lisinopril (cough), she confirmed 9/27/2024 that she only had a cough with  lisinopril.    NYHA Functional Class: 2  ACC/AHA Stage C heart failure  Volume status: Euvolemic  Perfusion status: Warm to touch  Aetiology: TBD    PLAN:  #HFrEF  -We will work towards medication optimization, as below, in the first instance  -Cardiac MRI with regadenoson challenge prior to next visit  - Plan for repeat TTE and CPET once heart failure medication is at goal  - Genetic testing has not been undertaken.  There is a vague sense of heart disease in her family, but no clear/known history of other relatives with heart failure  - Continue cardiac rehabilitation today  -Once medications are optimised will consider need for device therapy  -Heart failure lifestyle modifications discussed and Qs answered.     -Medication optimisation:  BB:  Continue metoprolol succinate 100 mg once daily  RAASi: Increase sacubitril/valsartan to 49/51 mg twice daily  AA: Continue  spironolactone 12 5 mg once daily  SGLT2i: Continue generic dapagliflozin 10 mg once daily, we discussed potential side effects   Stop Triamterene-hydrochlorothiazide     COUNSELING:   We discussed the following non-pharmacological measures during this visit:  ·Smoking and alcohol abstinence/cessation, if applicable.  ·Dietary and medication compliance (in particular, salt restriction)  ·Monitoring daily weights and blood pressures  ·Exercise regimen (walking)    Heart Failure Education Booklet given: 9/27/2024    Follow-up appointment in 4 weeks     This note was transcribed using the Dragon Dictation system. There may be grammatical, punctuation, or verbiage errors that can occur with voice recognition programs.    Georgette Ortiz MD PhD

## 2024-11-08 NOTE — PATIENT INSTRUCTIONS
Thank you for coming in today. If you have any questions or concerns, you may call the Heart Failure Office at [phone] option [phone], or [phone].  You may also contact our heart failure nursing team via email on [email or web address].    For quicker results set-up your  [employer] [facility] account to receive results and other correspondence directly to your phone.    Please bring all your pills/medications to your Cardiology appointments.    **  -We will renew your heart failure medications today    - Please make the following medication changes:  1. STOP hydrochlorothiazide (also called hydrochlorothiazide)    2. INCREASE Entresto to 49/51 mg twice daily    - Please have the following tests done:  1.Blood tests just before your next visit (RFP, BNP)    2. Cardiac MRI as scheduled for later this month ( 11/26/2024 at 3:00 PM). Call [phone] to confirm the location of this test    - Please make an appointment to be seen in 4 weeks  (VIRTUAL)

## 2024-11-12 ENCOUNTER — APPOINTMENT (OUTPATIENT)
Dept: CARDIAC REHAB | Facility: HOSPITAL | Age: 64
End: 2024-11-12
Payer: COMMERCIAL

## 2024-11-14 ENCOUNTER — APPOINTMENT (OUTPATIENT)
Dept: CARDIAC REHAB | Facility: HOSPITAL | Age: 64
End: 2024-11-14
Payer: COMMERCIAL

## 2024-11-19 ENCOUNTER — APPOINTMENT (OUTPATIENT)
Dept: CARDIAC REHAB | Facility: HOSPITAL | Age: 64
End: 2024-11-19
Payer: COMMERCIAL

## 2024-11-19 ENCOUNTER — TELEPHONE (OUTPATIENT)
Dept: CARDIAC REHAB | Facility: HOSPITAL | Age: 64
End: 2024-11-19
Payer: COMMERCIAL

## 2024-11-19 NOTE — PROGRESS NOTES
PATIENT: Leeann Shankar  DATE: 11/19/2024    Called patient regarding missed Cardiac Rehab appointment at [facility].   Left voicemail to call our offices at [phone].    Kathya Acevedo EP

## 2024-11-20 ENCOUNTER — TELEPHONE (OUTPATIENT)
Dept: CARDIAC REHAB | Facility: HOSPITAL | Age: 64
End: 2024-11-20
Payer: COMMERCIAL

## 2024-11-20 NOTE — TELEPHONE ENCOUNTER
PATIENT: Leeann Shankar  DATE: 11/20/2024    Patient called regarding scheduling Cardiac Rehab at [facility].   Pt would like to hold starting program until after Thanksgiving holiday due to busy schedule and recovering from illness. Appointments were rescheduled and she is set to start Cardiac Rehab on 12/5/24 at 2:00 PM.     Katharine Venegas RN

## 2024-11-21 ENCOUNTER — APPOINTMENT (OUTPATIENT)
Dept: CARDIAC REHAB | Facility: HOSPITAL | Age: 64
End: 2024-11-21
Payer: COMMERCIAL

## 2024-11-26 ENCOUNTER — HOSPITAL ENCOUNTER (OUTPATIENT)
Dept: RADIOLOGY | Facility: HOSPITAL | Age: 64
End: 2024-11-26
Payer: COMMERCIAL

## 2024-12-05 ENCOUNTER — APPOINTMENT (OUTPATIENT)
Dept: CARDIAC REHAB | Facility: HOSPITAL | Age: 64
End: 2024-12-05
Payer: COMMERCIAL

## 2024-12-05 NOTE — PROGRESS NOTES
Cardiac Rehabilitation Initial Treatment Plan    Name: Leeann Shankar   Medical Record Number: [id number]  YOB: 1960   Age: 64 y.o.  Today’s Date: 12/5/2024   Primary Care Physician: No primary care provider on file.   Referring Physician: Georgette Ortiz MD*   Program Location: [address]        General  Primary Diagnosis:   1. Hypertensive heart disease with heart failure  Referral to Cardiac Rehab    Follow Up In Cardiac Rehab         Onset/Date of Diagnosis: 03/04/24   Session #: 0  AACVPR Risk Stratification: High   Falls Risk: Low    Psychosocial Initial Assessment:   Pre PHQ-9: 7  No data recorded     Sent PH-Q 9 to MD if score > 20: No; score < 20    Pt reported/currently experiencing stress: Yes; Anxiety; Severity: moderate and Depression; Severity: moderate  Patient uses stress management skills: Yes , prayer  History of:  anxiety and depression  Currently seeing a mental health provider: No  Social Support: Yes, Whom:daughter (aid), kids, grandchildren  Quality of Life Survey: KCCQ (see Heart Failure Management below)    Learning Assessment:  Learning assessment/barriers: Work  Preferred learning method: Auditory, Visual, Reading handout, and Writing handout  Barriers: None  Comments:    Stages of Change: Contemplation    Psychosocial Plan  Goal Status: Initial Assessment; goals not yet started  Psychosocial Goals: Demonstrating proper techniques for stress management, Maintain or lower PH-Q 9 score by discharge, and Identify strategies for managing depression    Psychosocial Interventions/Education:   To be done in Cardiac Rehab.    Nutrition Initial Assessment:    Diet Habit Survey: Picture Your Plate  Pre: Survey to be given during initial exercise session.  Post: To be done at discharge.    Survey results reviewed with Dietician: Not at this time    Lipids Assessment  Current Dietary Guidelines:  Regular  Barriers to dietary change: no  Hyperlipidemia: Yes , takes  atorvastatin  Lab Results   Component Value Date    CHOL 127 10/11/2024    HDL 45.0 10/11/2024    LDLCALC 69 10/11/2024    TRIG 67 10/11/2024     Diabetes Assessment  History of Diabetes: Yes Pt monitors BS at home: Yes   Frequency: every other /day  FBS range: < 150  Hypoglycemic Episodes: No   Lab Results   Component Value Date    HGBA1C 8.1 (H) 08/13/2024      Weight Management  Weight: 111 kg (244 lb 3.2 oz)     Nutrition Plan  Goal Status: Initial Assessment; goals not yet started  Nutrition Goals: Improve Diet Habit Survey score by 5-10 points by discharge, Adapt a low-sodium, DASH diet prior to discharge, Adapt a Mediterranean focused diet prior to discharge, Learn how to read and interpret nutrition labels prior to discharge, Lose 1lb/week while enrolled in program, Improve HgbA1C prior to discharge, Check blood glucose daily, and Verbalize S/S of hypoglycemia or hyperglycemia by discharge    Nutrition Interventions/Education:   To be done in Cardiac Rehab.    Exercise Initial Assessment:  Current Home Exercise: Has full gym at home in basement (bike, treadmill, row machine, and weights); also swims but has not gone for the past month  Current Home Exercise?: No  Mode: NA  Frequency: NA  Duration: NA     Exercise Prescription  Exercise Prescription based on:   [facility] Activity Status Index (DASI)  DASI Score: 7.2   MET Score: 3.6     Patient health history  Frequency:  2 days/week  Mode: Treadmill, NuStep, and Recumbent Cycle  Duration: 24 total aerobic minutes  Intensity: RPE 12-16  Target HR:  To be calculated after 6 attended sessions.  MET Level: 2.6  Patient wears supplemental O2: No   Modality Workload METs Duration (minutes)   1 Pre-Exercise      2 Session Warm Up   5 : 00   3 Treadmill 2.0 mph @ 0 % 2.6 8 : 00   4 NuStep 4000 3 load @ 52 treadwell 2.6 8 : 00   5 Recumbent Bike 2 load @ 55 rpms 2.2 8 : 00   6 Post-Exercise         Resistance Training: No   Home Exercise Prescription given: To be given prior  to discharge from program.    Exercise Plan  Goal Status: Initial Assessment; goals not yet started  Exercise Goals: Increase exercise MET level by 5-10% each week, Increase total exercise duration to 30-45 minutes, Obtain 150 minutes/week of moderate intensity aerobic exercise, Initiate strength training 2-3 days a week, Initiate flexibility training 2-3 days a week, and Establish a home exercise program before discharge    Exercise Interventions/Education:   To be done in Cardiac Rehab.    Other Core Components/Risk Factor Initial Assessment:  Medication adherence  Medication compliance: Yes  Uses pill box/organizer: Yes   Carries medication list: Yes   Current Medications:   Medication Documentation Review Audit       Reviewed by Tricia Duffy RN (Registered Nurse) on 11/08/24 at 1306      Medication Order Taking? Sig Documenting Provider Last Dose Status   atorvastatin (Lipitor) 80 mg tablet [id number] Yes Take 1 tablet (80 mg) by mouth once daily. Historical Provider, MD Taking Active   baclofen (Lioresal) 20 mg tablet [id number] No Take 1 tablet (20 mg) by mouth 3 times a day. Stop methocarbamol   Patient not taking: Reported on 11/8/2024    Historical Provider, MD Taking Active   blood sugar diagnostic strip [id number] Yes Use as instructed Historical Provider, MD Taking Active   dapagliflozin propanediol (Farxiga) 10 mg [id number] Yes Take 1 tablet (10 mg) by mouth once daily. Georgette Ortiz MD PhD  Active   diclofenac sodium (Voltaren) 1 % gel [id number] Yes Apply 4.5 inches (4 g) topically. Historical Provider, MD  Active   hydroCHLOROthiazide (HYDRODiuril) 25 mg tablet [id number]  Take 0.5 tablets (12.5 mg) by mouth once daily.   Patient not taking: Reported on 11/8/2024    Historical Provider, MD  Active   lidocaine (lidocaine HCL) 4 % cream [id number] Yes Apply thin layer to affected area every 4 hours as needed for pain Historical Provider, MD  Active   lidocaine 4 % cream [id number] Yes   Historical Provider, MD  Active     Discontinued 03/04/24 2219   metoprolol succinate XL (Toprol-XL) 100 mg 24 hr tablet [id number] Yes Take 1 tablet (100 mg) by mouth once daily. Do not crush or chew. Georgette Ortiz MD PhD  Active   mirtazapine (Remeron) 15 mg tablet [id number] Yes Take 1 tablet (15 mg) by mouth once daily at bedtime. Historical Provider, MD  Active   Discontinued 11/05/24 1558   Mounjaro 12.5 mg/0.5 mL pen injector [id number] Yes  Historical Provider, MD  Active   nitrofurantoin, macrocrystal-monohydrate, (Macrobid) 100 mg capsule [id number] Yes  Historical Provider, MD  Active   OneTouch Delica Plus Lancet 33 gauge misc [id number] Yes  Historical Provider, MD Taking Active   OneTouch Ultra2 Meter misc [id number] Yes  Historical Provider, MD Taking Active   perflutren lipid microspheres (Definity) injection 10 mL of dilution [id number]   Moy East MD  Active   polyethylene glycol (Glycolax, Miralax) 17 gram packet [id number] Yes Take 17 g by mouth once daily. Historical Provider, MD Taking Active   sacubitriL-valsartan (Entresto) 24-26 mg tablet [id number] Yes Take 1 tablet by mouth 2 times a day. Georgette Ortiz MD PhD  Active   spironolactone (Aldactone) 25 mg tablet [id number] Yes Take 1 tablet (25 mg) by mouth once daily. Georgette Ortiz MD PhD  Active   tiZANidine (Zanaflex) 4 mg tablet [id number] Yes Take 1 tablet (4 mg) by mouth. Historical MD Matty  Active   topiramate (Topamax) 50 mg tablet [id number] Yes Take 1 tablet (50 mg) by mouth 2 times a day. Historical Provider, MD Taking Active   triamterene-hydrochlorothiazid (Maxzide-25) 37.5-25 mg tablet [id number]     Patient not taking: Reported on 11/8/2024    Historical MD Matty  Active                   Is patient prescribed Nitroglycerin? No  Blood Pressure Management  History of Hypertension: Yes   Medication Changes: No   Resting BP:  Visit Vitals  /72   Pulse 84       Heart Failure Management  Hx of Heart Failure:  Yes, Type (selection): HFrEF  Most recent EF: Echocardiogram - LVEF (%): 30     Onset of heart failure diagnosis: 3/2024  Last heart failure hospitalization: 3/3/24  Number of HF admissions per year: 1    Symptoms: Fatigue with exertion, Shortness of breath, and Orthopnea (sleeps on stomach)  Is there a family Hx of HF: Yes   Does patient obtain daily weight No , has scale     KCCQ survey: Pre: 76.88  Post: To be done at discharge    Heart Failure Reassessment Heart Failure Symptom Management: Initial Treatment Plan. Will reassess in 30 days.    Heart Failure Goals: Able to verbalize signs and symptoms of fluid retention and when to contact MD, Adhere to proper fluid restrictions, Adapt a low sodium diet and verbalize guidelines, Obtain daily weight and verbalize proper method of obtaining weights    Smoking/Tobacco Assessment  Social History     Tobacco Use    Smoking status: Former     Types: Cigarettes    Smokeless tobacco: Never   Substance Use Topics    Alcohol use: Not Currently    Drug use: Not Currently      Other Core Component Plan  Goal Status: Initial Assessment; goals not yet started  Other Core Component Goals: Verbalize medication usage and drug actions by discharge and Achieve resting BP of < 130/80 by discharge    Other Core Component Interventions/Education:   To be done in Cardiac Rehab.    Individual Patient Goals:  Resume swimming by discharge.  Establish home exercise regimen by discharge from program.  Goal Status: Initial Assessment; goals not yet started    Staff Comments:  Initial assessment done In Person at [facility]. Patient plans to attend initial session 11/12/24 at 2:00 PM.  Due to work schedule, patient plans to attend 2 days a week on Tuesdays and Thursdays.      Rehab Staff Signature: JUAN Lora

## 2024-12-12 ENCOUNTER — TELEPHONE (OUTPATIENT)
Dept: CARDIAC REHAB | Facility: HOSPITAL | Age: 64
End: 2024-12-12
Payer: COMMERCIAL

## 2024-12-12 NOTE — PROGRESS NOTES
PATIENT: Leeann Shankar  DATE: 12/12/2024    Called patient regarding missed appointments in Cardiac Rehab at [facility].   Left voicemail to call our offices to at [phone].    Kathya Acevedo EP

## 2024-12-13 ENCOUNTER — APPOINTMENT (OUTPATIENT)
Dept: CARDIOLOGY | Facility: HOSPITAL | Age: 64
End: 2024-12-13
Payer: COMMERCIAL

## 2024-12-17 ENCOUNTER — APPOINTMENT (OUTPATIENT)
Dept: CARDIAC REHAB | Facility: HOSPITAL | Age: 64
End: 2024-12-17
Payer: COMMERCIAL

## 2024-12-19 ENCOUNTER — APPOINTMENT (OUTPATIENT)
Dept: CARDIAC REHAB | Facility: HOSPITAL | Age: 64
End: 2024-12-19
Payer: COMMERCIAL

## 2024-12-19 ENCOUNTER — TELEPHONE (OUTPATIENT)
Dept: CARDIAC REHAB | Facility: HOSPITAL | Age: 64
End: 2024-12-19
Payer: COMMERCIAL

## 2024-12-19 NOTE — PROGRESS NOTES
PATIENT: Leeann Shankar  DATE: 12/19/2024    Called patient regarding missed Cardiac Rehab appointments at [facility].   Spoke to patient and she reports to be busy and unable to attend rehab until after new year. Scheduled her rehab appointments from 01/07/2025.    JUAN Lora

## 2024-12-24 ENCOUNTER — APPOINTMENT (OUTPATIENT)
Dept: CARDIAC REHAB | Facility: HOSPITAL | Age: 64
End: 2024-12-24
Payer: COMMERCIAL

## 2024-12-26 ENCOUNTER — APPOINTMENT (OUTPATIENT)
Dept: CARDIAC REHAB | Facility: HOSPITAL | Age: 64
End: 2024-12-26
Payer: COMMERCIAL

## 2024-12-31 ENCOUNTER — APPOINTMENT (OUTPATIENT)
Dept: CARDIAC REHAB | Facility: HOSPITAL | Age: 64
End: 2024-12-31
Payer: COMMERCIAL

## 2025-01-02 ENCOUNTER — APPOINTMENT (OUTPATIENT)
Dept: CARDIAC REHAB | Facility: HOSPITAL | Age: 65
End: 2025-01-02
Payer: COMMERCIAL

## 2025-01-07 ENCOUNTER — CLINICAL SUPPORT (OUTPATIENT)
Dept: CARDIAC REHAB | Facility: HOSPITAL | Age: 65
End: 2025-01-07
Payer: COMMERCIAL

## 2025-01-07 DIAGNOSIS — I11.0 HYPERTENSIVE HEART DISEASE WITH HEART FAILURE: ICD-10-CM

## 2025-01-07 PROCEDURE — 93798 PHYS/QHP OP CAR RHAB W/ECG: CPT | Performed by: INTERNAL MEDICINE

## 2025-01-16 ENCOUNTER — APPOINTMENT (OUTPATIENT)
Dept: CARDIOLOGY | Facility: HOSPITAL | Age: 65
End: 2025-01-16
Payer: COMMERCIAL

## 2025-01-16 ENCOUNTER — TELEPHONE (OUTPATIENT)
Dept: CARDIAC REHAB | Facility: HOSPITAL | Age: 65
End: 2025-01-16

## 2025-01-21 ENCOUNTER — LAB (OUTPATIENT)
Dept: LAB | Facility: LAB | Age: 65
End: 2025-01-21
Payer: COMMERCIAL

## 2025-01-21 ENCOUNTER — CLINICAL SUPPORT (OUTPATIENT)
Dept: CARDIAC REHAB | Facility: HOSPITAL | Age: 65
End: 2025-01-21
Payer: COMMERCIAL

## 2025-01-21 DIAGNOSIS — I11.0 HYPERTENSIVE HEART DISEASE WITH HEART FAILURE: ICD-10-CM

## 2025-01-21 DIAGNOSIS — I15.8 OTHER SECONDARY HYPERTENSION: ICD-10-CM

## 2025-01-21 DIAGNOSIS — I50.20 HFREF (HEART FAILURE WITH REDUCED EJECTION FRACTION): ICD-10-CM

## 2025-01-21 LAB
ALBUMIN SERPL BCP-MCNC: 4.8 G/DL (ref 3.4–5)
ANION GAP SERPL CALC-SCNC: 17 MMOL/L (ref 10–20)
BNP SERPL-MCNC: 21 PG/ML (ref 0–99)
BUN SERPL-MCNC: 24 MG/DL (ref 6–23)
CALCIUM SERPL-MCNC: 10 MG/DL (ref 8.6–10.6)
CHLORIDE SERPL-SCNC: 105 MMOL/L (ref 98–107)
CO2 SERPL-SCNC: 21 MMOL/L (ref 21–32)
CREAT SERPL-MCNC: 1.48 MG/DL (ref 0.5–1.05)
EGFRCR SERPLBLD CKD-EPI 2021: 40 ML/MIN/1.73M*2
GLUCOSE SERPL-MCNC: 96 MG/DL (ref 74–99)
PHOSPHATE SERPL-MCNC: 4.5 MG/DL (ref 2.5–4.9)
POTASSIUM SERPL-SCNC: 5 MMOL/L (ref 3.5–5.3)
SODIUM SERPL-SCNC: 138 MMOL/L (ref 136–145)

## 2025-01-21 PROCEDURE — 80069 RENAL FUNCTION PANEL: CPT

## 2025-01-21 PROCEDURE — 93798 PHYS/QHP OP CAR RHAB W/ECG: CPT | Performed by: INTERNAL MEDICINE

## 2025-01-21 PROCEDURE — 83880 ASSAY OF NATRIURETIC PEPTIDE: CPT

## 2025-01-23 ENCOUNTER — APPOINTMENT (OUTPATIENT)
Dept: CARDIAC REHAB | Facility: HOSPITAL | Age: 65
End: 2025-01-23
Payer: COMMERCIAL

## 2025-01-23 ENCOUNTER — OFFICE VISIT (OUTPATIENT)
Dept: CARDIOLOGY | Facility: CLINIC | Age: 65
End: 2025-01-23
Payer: COMMERCIAL

## 2025-01-23 VITALS
DIASTOLIC BLOOD PRESSURE: 70 MMHG | BODY MASS INDEX: 43.05 KG/M2 | HEIGHT: 63 IN | OXYGEN SATURATION: 99 % | WEIGHT: 243 LBS | SYSTOLIC BLOOD PRESSURE: 130 MMHG | HEART RATE: 100 BPM | RESPIRATION RATE: 20 BRPM

## 2025-01-23 DIAGNOSIS — I50.22 CHRONIC CLINICAL SYSTOLIC HEART FAILURE: ICD-10-CM

## 2025-01-23 DIAGNOSIS — I50.20 HFREF (HEART FAILURE WITH REDUCED EJECTION FRACTION): Primary | ICD-10-CM

## 2025-01-23 PROCEDURE — 99215 OFFICE O/P EST HI 40 MIN: CPT | Performed by: STUDENT IN AN ORGANIZED HEALTH CARE EDUCATION/TRAINING PROGRAM

## 2025-01-23 PROCEDURE — 3075F SYST BP GE 130 - 139MM HG: CPT | Performed by: STUDENT IN AN ORGANIZED HEALTH CARE EDUCATION/TRAINING PROGRAM

## 2025-01-23 PROCEDURE — 1036F TOBACCO NON-USER: CPT | Performed by: STUDENT IN AN ORGANIZED HEALTH CARE EDUCATION/TRAINING PROGRAM

## 2025-01-23 PROCEDURE — 3078F DIAST BP <80 MM HG: CPT | Performed by: STUDENT IN AN ORGANIZED HEALTH CARE EDUCATION/TRAINING PROGRAM

## 2025-01-23 PROCEDURE — 3008F BODY MASS INDEX DOCD: CPT | Performed by: STUDENT IN AN ORGANIZED HEALTH CARE EDUCATION/TRAINING PROGRAM

## 2025-01-23 RX ORDER — SACUBITRIL AND VALSARTAN 97; 103 MG/1; MG/1
1 TABLET, FILM COATED ORAL 2 TIMES DAILY
Qty: 60 TABLET | Refills: 11 | Status: SHIPPED | OUTPATIENT
Start: 2025-01-23 | End: 2026-01-23

## 2025-01-23 ASSESSMENT — ENCOUNTER SYMPTOMS
CARDIOVASCULAR NEGATIVE: 1
RESPIRATORY NEGATIVE: 1
DEPRESSION: 0
NEUROLOGICAL NEGATIVE: 1
CONSTITUTIONAL NEGATIVE: 1
GASTROINTESTINAL NEGATIVE: 1

## 2025-01-23 NOTE — PROGRESS NOTES
Referring Clinician:   Primary cardiologist: Dr. 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  Review of the electronic medical record shows a past medical history significant for hypertension, OA, hyperlipidemia, asthma, diabetes mellitus, obesity with BMI~44 kg/m². She presented to [facility] 3/2024 with dyspnea and was found to have elevated natruretic peptides, and depressed ejection fraction.  On TTE 4/2024 LVEF 30-35% LVIDD 5.7 cm.  There is a documented allergy to lisinopril (cough), she confirmed 9/27/2024 that she only had a cough with lisinopril.  At last visit HF GDMT adjustment commenced  Between visits she has been attending cardiac rehabilitation, but is unable to get her cardiac MRI due to insurance declination.  She saw the heart failure team at [facility] ( Dr Mitchell) and is planning to continue her advanced heart failure care with [facility] HF team.  She has been fully adherent with heart failure pharmacotherapy  Her sister is in hospice ( cancer)      Symptomatically, there is no chest pain, no SOB at rest, mild SOBOE, PND a few nights a week, 1 pillow orthopnoea,  no further  chronic leg  swelling ( wears compresison stockings), no palpitations, no syncope, no pre syncope.    Over the holiday she briefly used her sister's  potassium for muscle pain. She discussed  not using medication as this fashion.  Fortunately her potassium levels are normal.    She is enrolled in the weight loss program and is an active participant.  Plans to lose 20 pounds by our next visit.    Functionally, her exercise capacity is described as baseline - can walk in [employer] can climb a flight of stairs and walk a city block.  Moreover, she swims twice a week for up to 3 hours without major exertional symptoms.  Her knees and hip pain limit her mobility mostly.    She initially volunteered  "that she \"love salty food, fast food and all of that\".  Overall, since her initial visit, has been doing better in this regard    She is strictly adherent with all prescribed medications    Her last HF hospitalisation was 3/2024.    She brought her blood pressure log today with satisfactory readings.  This has been scanned into EPIC.    Review of Systems   Constitutional: Negative.   Cardiovascular: Negative.    Respiratory: Negative.     Gastrointestinal: Negative.    Genitourinary: Negative.    Neurological: Negative.         Surgical Hx:  - C sections x 4    Past Obstetric Hx:  - G4  - No known cardiac complications of pregnancy    Social Hx:  - Smoking- quit 2021, prev smoked from age [age] years 1/2 PPD  - ETOH- never a heavy drinker, nil now  - Illicit drugs-Last crack ~ 1980  - Lives alone, grandson visits often. Feels safe at home      Family Hx:  Specifically, there is no family history of  CAD, heart failure, ICD, PPM, OHT, arrhythmias, CVA, or sudden cardiac death.    Sister-heart disease? Dx  Niece- \"2 blood clots in her heart and fluid to her hips\"  - Cousin- ? Heart pump  Brother--heart disease ? Dx     Medication reconciliation completed, see below.     Medication Documentation Review Audit       Reviewed by Macarena Hernandez RN (Registered Nurse) on 01/23/25 at 1336      Medication Order Taking? Sig Documenting Provider Last Dose Status   atorvastatin (Lipitor) 80 mg tablet [id number] Yes Take 1 tablet (80 mg) by mouth once daily. Historical Provider, MD  Active   baclofen (Lioresal) 20 mg tablet [id number] Yes Take 1 tablet (20 mg) by mouth 3 times a day. Stop methocarbamol Historical Provider, MD  Active   blood pressure monitor (Blood Pressure Kit) kit [id number] Yes Take blood pressure 1-2 times a day Georgette Ortiz MD PhD  Active   blood sugar diagnostic strip [id number] Yes Use as instructed Historical Provider, MD  Active   dapagliflozin propanediol (Farxiga) 10 mg [id number] Yes Take 1 tablet (10 " mg) by mouth once daily. Georgette Ortiz MD PhD  Active   diclofenac sodium (Voltaren) 1 % gel [id number] Yes Apply 4.5 inches (4 g) topically. Historical Provider, MD  Active   lidocaine (lidocaine HCL) 4 % cream [id number] Yes Apply thin layer to affected area every 4 hours as needed for pain Historical Provider, MD  Active   lidocaine 4 % cream [id number]     Patient not taking: Reported on 1/23/2025    Historical MD Matty  Flag for Review     Discontinued 03/04/24 2219   metoprolol succinate XL (Toprol-XL) 100 mg 24 hr tablet [id number] Yes Take 1 tablet (100 mg) by mouth once daily. Do not crush or chew. Georgette Ortiz MD PhD  Active   mirtazapine (Remeron) 15 mg tablet [id number] Yes Take 1 tablet (15 mg) by mouth once daily at bedtime. Historical Provider, MD  Active   Mounjaro 12.5 mg/0.5 mL pen injector [id number] Yes  Historical Provider, MD  Active   nitrofurantoin, macrocrystal-monohydrate, (Macrobid) 100 mg capsule [id number]     Patient not taking: Reported on 1/23/2025    Historical Provider, MD  Flag for Review   OneTouch Delica Plus Lancet 33 gauge misc [id number]   Historical Provider, MD  Active   OneTouch Ultra2 Meter misc [id number]   Historical Provider, MD  Active      Discontinued 01/23/25 1335   polyethylene glycol (Glycolax, Miralax) 17 gram packet [id number] Yes Take 17 g by mouth once daily. Historical Provider, MD  Active   sacubitriL-valsartan (Entresto) 49-51 mg tablet [id number] Yes Take 1 tablet by mouth 2 times a day. Georgette Ortiz MD PhD  Active   spironolactone (Aldactone) 25 mg tablet [id number] Yes Take 1 tablet (25 mg) by mouth once daily. Georgette Ortiz MD PhD  Active   tiZANidine (Zanaflex) 4 mg tablet [id number] Yes Take 1 tablet (4 mg) by mouth. Historical Provider, MD  Active   topiramate (Topamax) 50 mg tablet [id number] Yes Take 1 tablet (50 mg) by mouth 2 times a day. Historical Provider, MD  Active   triamterene-hydrochlorothiazid (Maxzide-25) 37.5-25 mg  "tablet [id number]     Patient not taking: Reported on 1/23/2025    Historical Provider, MD  Flag for Review                      Allergies   Allergen Reactions    Lisinopril Cough        Investigations:    The electronic medical record has been reviewed by me for salient history. All cardiovascular imaging and testing available in the electronic medical record, and [employer] has been reviewed.    Visit Vitals  /70 (BP Location: Left arm, Patient Position: Sitting, BP Cuff Size: Large adult)   Pulse 100   Resp 20   Ht 1.588 m (5' 2.5\")   Wt 110 kg (243 lb)   SpO2 99%   BMI 43.74 kg/m²   Smoking Status Former   BSA 2.2 m²       On examination:    Very pleasant obese middle aged AA woman in no apparent CP or painful distress  Well  groomed   Neck: No JVD or HJR  CVS: HS 1,2.   No added sounds  Resp: CTA bilaterally. Percussion note resonant  Abdomen: Obese,SNT, BS wnl  Extremities: No pedal oedema  Skin: warm and dry  CNS: AO x 4, no gross deficits  Lab Results   Component Value Date    WBC 6.7 10/12/2024    HGB 12.5 10/12/2024    HCT 41.1 10/12/2024     10/12/2024    CHOL 127 10/11/2024    TRIG 67 10/11/2024    HDL 45.0 10/11/2024    ALT 27 10/12/2024    AST 42 (H) 10/12/2024     01/21/2025    K 5.0 01/21/2025     01/21/2025    CREATININE 1.48 (H) 01/21/2025    BUN 24 (H) 01/21/2025    CO2 21 01/21/2025    TSH 1.44 10/11/2024    HGBA1C 8.1 (H) 08/13/2024      Transthoracic echo (TTE) complete    Result Date: 4/30/2024   [facility], [address] [place] [address]                Tel [phone] and Fax [phone] TRANSTHORACIC ECHOCARDIOGRAM REPORT  Patient Name:      FLAVIA OSEI         Reading Physician:    [id number] Rodolfo Long MD Study Date:        4/30/2024            Ordering Provider:    [id number] DECLAN GRIJALVA MRN/PID:           [id number]            "  Fellow: Accession#:        [id number]         Nurse:                Cori Coker RN Date of Birth/Age: 1960 / 63 years Sonographer:          Lucille Calderon RDCS Gender:            F                    Additional Staff: Height:            157.48 cm            Admit Date: Weight:            109.32 kg            Admission Status:     Outpatient BSA / BMI:         2.07 m2 / 44.08      Encounter#:           [id number]                    kg/m2                                         Department Location:  [facility] Non                                                               Invasive Blood Pressure: 131 /77 mmHg Study Type:    TRANSTHORACIC ECHO (TTE) COMPLETE Diagnosis/ICD: Heart failure, unspecified-I50.9; Hypertensive heart disease with                heart failure-I11.0 Indication:    New onset of congestive heart failure; hypertensive heart disease                with heart failure CPT Code:      Echo Complete w Full Doppler-31416 Patient History: Pertinent History: HTN, DM, DLD, asthma. Study Detail: The following Echo studies were performed: 2D, M-Mode, color flow               and Doppler. Technically challenging study due to body habitus.               Definity used as a contrast agent for endocardial border               definition. Total contrast used for this procedure was 2.0 mL via               IV push.  PHYSICIAN INTERPRETATION: Left Ventricle: The left ventricular systolic function is moderately decreased, with an estimated ejection fraction of 30-35%. There is global hypokinesis of the left ventricle with minor regional variations. The left ventricular cavity size is mildly dilated. There is mild eccentric left ventricular hypertrophy. Spectral Doppler shows an impaired relaxation pattern of left ventricular diastolic filling. Left Atrium: The left atrium is  normal in size. Right Ventricle: The right ventricle is normal in size. There is normal right ventricular global systolic function. Right Atrium: The right atrium is normal in size. Aortic Valve: The aortic valve is trileaflet. There is trivial aortic valve regurgitation. The peak instantaneous gradient of the aortic valve is 4.2 mmHg. Mitral Valve: The mitral valve is normal in structure. There is trace mitral valve regurgitation. Tricuspid Valve: The tricuspid valve is structurally normal. There is trace tricuspid regurgitation. The right ventricular systolic pressure is unable to be estimated. Pulmonic Valve: The pulmonic valve is structurally normal. The pulmonic valve regurgitation was not well visualized. Pericardium: There is no pericardial effusion noted. Aorta: The aortic root is normal. Systemic Veins: The inferior vena cava appears to be of normal size. In comparison to the previous echocardiogram(s): There are no prior studies on this patient for comparison purposes.  CONCLUSIONS:  1. Left ventricular systolic function is moderately decreased with a 30-35% estimated ejection fraction.  2. Poorly visualized anatomical structures due to suboptimal image quality.  3. Spectral Doppler shows an impaired relaxation pattern of left ventricular diastolic filling.  4. There is mild eccentric left ventricular hypertrophy.  5. There is global hypokinesis of the left ventricle with minor regional variations. QUANTITATIVE DATA SUMMARY: 2D MEASUREMENTS:                          Normal Ranges: Ao Root d:     2.80 cm   (2.0-3.7cm) LAs:           3.20 cm   (2.7-4.0cm) IVSd:          1.00 cm   (0.6-1.1cm) LVPWd:         0.80 cm   (0.6-1.1cm) LVIDd:         5.70 cm   (3.9-5.9cm) LVIDs:         4.10 cm LV Mass Index: 95.5 g/m2 LV % FS        28.1 % AORTA MEASUREMENTS:                    Normal Ranges: Asc Ao, d: 2.60 cm (2.1-3.4cm) LV SYSTOLIC FUNCTION BY 2D PLANIMETRY (MOD):                     Normal Ranges: EF-A4C View:  26.1 % (>=55%) EF-A2C View: 38.6 % EF-Biplane:  31.3 % AORTIC VALVE:                         Normal Ranges: AoV Vmax:      1.02 m/s (<=1.7m/s) AoV Peak P.2 mmHg (<20mmHg) LVOT Max Alejandro:  0.99 m/s (<=1.1m/s) LVOT VTI:      20.20 cm LVOT Diameter: 1.80 cm  (1.8-2.4cm) AoV Area,Vmax: 2.47 cm2 (2.5-4.5cm2)  RIGHT VENTRICLE: TAPSE: 20.3 mm RV s'  0.13 m/s TRICUSPID VALVE/RVSP:                   Normal Ranges: IVC Diam: 1.40 cm PULMONIC VALVE:                         Normal Ranges: PV Accel Time: 79 msec  (>120ms) PV Max Alejandro:    0.7 m/s  (0.6-0.9m/s) PV Max P.8 mmHg  [id number] Rodolfo Long MD Electronically signed on 2024 at 11:57:59 AM  ** Final **     Echocardiogram stress test    Result Date: 3/5/2024    [facility], [address] [place] [address]                Tel [phone] and Fax [phone]  Dobutamine Stress Echo Patient Name:     OMARI OSEI        Ordering Provider:    [id number] ABDELRAHMAN RODRIGUES Study Date:       3/4/2024            Reading Physician:    [id number] Escobar MD MRN/PID:          [id number]            Supervising           Lizzie Escobar                                       Physician:            MD Accession#:       [id number]        Fellow:               [id number] Moy East MD Date of           1960      Fellow: Birth/Age:        years Gender:           F                   Nurse:                Sofia Hernandez RN Admission Status: Inpatient           Sonographer:          ADM Height:           157.48 cm           Technologist: Weight:           117.03 kg           Additional Staff: BSA:              2.13 m2             Encounter#:           [id number] BMI:              47.19 kg/m2         Patient Location: Study Type:    ECHOCARDIOGRAM STRESS TEST Diagnosis/ICD: Other forms of dyspnea-R06.09 Indication:    Dyspnea on Exertion CPT  Code:      Stress Echo-52785; Stress Test Interpretation-80748; Stress Test                Supervision-[id number] Falls Risk:  Study Details: Correct procedure and correct patient verified verbally and with                ID Band checked.  Patient History: Hypertension, dyslipidemia and dyspnea. Allergies: Lisinopril-cough. Smoker:    Former. Diabetes:  Yes.  Medications: The patient's prescribed medication is HCTZ, metoprolol. The patient took medications as prescribed.  Patient Performance: The patient received 30 mcg/kg/min of Dobutamine and a total dose of 0.25 mg of atropine in divided doses of 0.25 mg at 11:20:28 AM. The resting blood pressure was 166/97 mmHg with a heart rate of 90 bpm. The patient developed no symptoms during the stress exam. The blood pressure response was normal. The test was terminated due to: MPHR >85%. Patient has met the discharge criteria and is discharged to their floor.  Baseline ECG: Resting ECG showed normal sinus rhythm.  Stress ECG: Stress ECG showed sinus tachycardia. No ST changes.  Stress Stage Data: +---+------+-------+---------------------------------------+ HR Sys BPDias BPComments                                +---+------+-------+---------------------------------------+ 90 166   97                                             +---+------+-------+---------------------------------------+ 87 169   89     Dobutamine @ 5 mcg/kg/min. No symptoms  +---+------+-------+---------------------------------------+ 98 185   79     Dobutamine @ 10mcg/kg/min. No symptoms  +---+------+-------+---------------------------------------+ [id number]   80     Dobutamine @ 20 mcg/kg/min. No symptoms +---+------+-------+---------------------------------------+ 680260   73     Dobutamine @ 30 mcg/kg/min. No symptoms +---+------+-------+---------------------------------------+  Recovery ECG: Recovery ECG showed normal sinus rhythm, with rare PVC.   +------------+---+------+-------+----------------------+             HR Sys BPDias BPComments               +------------+---+------+-------+----------------------+ Recovery I  129             1 minute. No sympoms   +------------+---+------+-------+----------------------+ Recovery II [id number]   74     2 minutes. No symptoms +------------+---+------+-------+----------------------+ Recovery [id number]   73     4 minutes. No symptoms +------------+---+------+-------+----------------------+ Recovery IV 96 153   82     6 minutes. No symtoms  +------------+---+------+-------+----------------------+  Baseline Echo: Global LV systolic function is mildly decreased. The resting baseline ejection fraction was estimated at 45 to 50%. There are no regional wall motion abnormalities at baseline.  Stress Echo: There are no stress induced regional wall motion abnormalities. The ejection fraction is approximately 60 to 65% at peak stress. The left ventricular internal cavity size at peak stress is decreased. At peak stress, the global LV systolic function is increased.  Summary:  1. The resting ejection fraction was estimated at 45 to 50% with a peak exercise ejection fraction estimated at 60 to 65%.  2. Mildly decreased global left ventricular systolic function.  3. Adequate level of stress achieved.  4. No clinical, echocardiographic or electrocardiographic evidence for ischemia at a maximal infusion.  5. No stress induced ischemia, however resting EF is reduced. Suggest full echo.  6. There were no stress-induced wall motion abnormalities. This is a negative stress echo test for ischemia. 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  My final recommendations will be communicated back to the requesting clinician  by way of shared  Medical record.   Review of the electronic medical record shows a past medical history significant for hypertension, OA, hyperlipidemia, asthma, diabetes mellitus, obesity with BMI~45 kg/m².  She presented to [facility] 3/2024 with dyspnea and was found to have elevated natruretic peptides, and depressed ejection fraction.  On TTE 4/2024 LVEF 30-35% LVIDD 5.7 cm.  For HFrEF she is currently maintained on losartan.  There is a documented allergy to lisinopril (cough), she confirmed 9/27/2024 that she only had a cough with lisinopril.    NYHA Functional Class: 2  ACC/AHA Stage C heart failure  Volume status: Euvolemic  Perfusion status: Warm to touch  Aetiology: TBD    PLAN:  #HFrEF  -We will work towards medication optimization, as below, in the first instance  -Cardiac MRI with regadenoson challenge prior to next visit, insurance clearance pending  - Plan for repeat TTE and CPET once heart failure medication is at goal  - Genetic testing has not been undertaken.  There is a vague sense of heart disease in her family, but no clear/known history of other relatives with heart failure  - Continue cardiac rehabilitation  -Once medications are optimised will consider need for device therapy  -Heart failure lifestyle modifications discussed and Qs answered.     -Medication optimisation:  BB:  Continue metoprolol succinate 100 mg once daily  RAASi: Increase sacubitril/valsartan to 97/103 mg twice daily  AA: Continue  spironolactone 12 5 mg once daily  SGLT2i: Continue generic dapagliflozin 10 mg once daily  Sinew to hold triamterene-hydrochlorothiazide     COUNSELING:   We discussed the following non-pharmacological measures during this visit:  ·Smoking and alcohol abstinence/cessation, if applicable.  ·Dietary and medication compliance (in particular, salt restriction)  ·Monitoring daily weights and blood pressures  ·Exercise regimen (walking)    Heart Failure Education Booklet given: 9/27/2024    Follow-up  appointment in 4 months, renal function panel check in 1 month in 4 months    This note was transcribed using the Dragon Dictation system. There may be grammatical, punctuation, or verbiage errors that can occur with voice recognition programs.    Georgette Ortiz MD PhD

## 2025-01-23 NOTE — PATIENT INSTRUCTIONS
Thank you for coming in today. If you have any questions or concerns, you may call the Heart Failure Office at [phone] option 6, or [phone].  You may also contact our heart failure nursing team via email on [email or web address].     For quicker results set-up your  medineering account to receive results and other correspondence directly to your phone.     Please bring all your pills/medications to your Cardiology appointments.     **  - Make slow positional changes      - Please make the following medication changes:  1.  INCREASE Entresto to 97/103 mg twice daily     - Please have the following tests done:  1.Blood tests in one month  (RFP, BNP, CBC, iron, TIBC, ferritin)     2.  Blood tests in 4 months   (RFP, BNP, CBC, iron, TIBC, ferritin)    3. Echocardiogram in MAY 2025, we can schedule this for you today or you may CALL  [phone]   OR    [phone] to schedule       - Please make an appointment to be seen in May  2025

## 2025-01-28 ENCOUNTER — APPOINTMENT (OUTPATIENT)
Dept: CARDIAC REHAB | Facility: HOSPITAL | Age: 65
End: 2025-01-28
Payer: COMMERCIAL

## 2025-01-30 ENCOUNTER — APPOINTMENT (OUTPATIENT)
Dept: CARDIAC REHAB | Facility: HOSPITAL | Age: 65
End: 2025-01-30
Payer: COMMERCIAL

## 2025-01-31 ENCOUNTER — DOCUMENTATION (OUTPATIENT)
Dept: CARDIAC REHAB | Facility: HOSPITAL | Age: 65
End: 2025-01-31
Payer: COMMERCIAL

## 2025-01-31 NOTE — PROGRESS NOTES
Cardiac Rehabilitation 30 Day Reassessment    Name: Leeann Shankar   Medical Record Number: [id number]  YOB: 1960   Age: 64 y.o.  Today’s Date: 1/31/2025   Primary Care Physician: No primary care provider on file.   Referring Physician: Georgette Ortiz MD*   Program Location: [address]  Primary Diagnosis:   1. Hypertensive heart disease with heart failure  Referral to Cardiac Rehab    Follow Up In Cardiac Rehab         Onset/Date of Diagnosis: 03/04/24   Session #: 2  AACVPR Risk Stratification: High   Falls Risk: Low    Psychosocial Reassessment:    Pre PHQ-9: 7  No data recorded     Sent PH-Q 9 to MD if score > 20: No; score < 20    Pt reported/currently experiencing stress: Yes; Anxiety; Severity: moderate and Depression; Severity: moderate  Patient uses stress management skills: Yes , prayer  History of:  anxiety and depression  Currently seeing a mental health provider: No  Social Support: Yes, Whom:daughter (aid), kids, grandchildren  Quality of Life Survey: KCCQ (see Heart Failure Management below)    Learning Assessment:  Learning assessment/barriers: Work  Preferred learning method: Auditory, Visual, Reading handout, and Writing handout  Barriers: None  Comments:    Stages of Change: Action    Psychosocial Plan  Goal Status: In progress  Psychosocial Goals: Demonstrating proper techniques for stress management, Maintain or lower PH-Q 9 score by discharge, and Identify strategies for managing depression    Psychosocial Interventions/Education:   1/31/2025 Could not reassess stress due to lack of attendance.    Nutrition Reassessment:     Diet Habit Survey: Picture Your Plate  Pre: Survey not yet returned by patient.  Post: To be done at discharge.    Survey results reviewed with Dietician: Not at this time    Lipids Assessment  Current Dietary Guidelines:  Regular  Barriers to dietary change: no  Hyperlipidemia: Yes , takes atorvastatin  Lab Results   Component  Value Date    CHOL 127 10/11/2024    HDL 45.0 10/11/2024    LDLCALC 69 10/11/2024    TRIG 67 10/11/2024     Diabetes Assessment  History of Diabetes: Yes Pt monitors BS at home: Yes   Frequency: every other /day  FBS range: < 150  Hypoglycemic Episodes: No   Lab Results   Component Value Date    HGBA1C 8.1 (H) 08/13/2024      Weight Management        Nutrition Plan  Goal Status: In progress  Nutrition Goals: Improve Diet Habit Survey score by 5-10 points by discharge, Adapt a low-sodium, DASH diet prior to discharge, Adapt a Mediterranean focused diet prior to discharge, Learn how to read and interpret nutrition labels prior to discharge, Lose 1lb/week while enrolled in program, Improve HgbA1C prior to discharge, Check blood glucose daily, and Verbalize S/S of hypoglycemia or hyperglycemia by discharge    Nutrition Interventions/Education:   1/31/2025 See the staff comments below.    Exercise Reassessment:   Current Home Exercise: Has full gym at home in basement (bike, treadmill, row machine, and weights); also swims but has not gone for the past month  Current Home Exercise?: No  Mode: NA  Frequency: NA  Duration: NA     Exercise Prescription  Exercise Prescription based on:   [facility] Activity Status Index (DASI)  DASI Score: 7.2   MET Score: 3.6     Patient health history  Frequency:  2 days/week  Mode: Treadmill, NuStep, and Recumbent Cycle  Duration: 24 total aerobic minutes  Intensity: RPE 12-16  Target HR:  To be calculated after 6 attended sessions.  MET Level: 2.6  Patient wears supplemental O2: No   Modality Workload METs Duration (minutes)   1 Pre-Exercise      2 Session Warm Up   5 : 00   3 Treadmill 2.0 mph @ 0 % 2.6 8 : 00   4 NuStep 4000 3 load @ 52 treadwell 2.6 8 : 00   5 Recumbent Bike 2 load @ 55 rpms 2.2 8 : 00   6 Post-Exercise         Resistance Training: No   Home Exercise Prescription given: To be given prior to discharge from program.    Exercise Plan  Goal Status: In progress  Exercise Goals:  Increase exercise MET level by 5-10% each week, Increase total exercise duration to 30-45 minutes, Obtain 150 minutes/week of moderate intensity aerobic exercise, Initiate strength training 2-3 days a week, Initiate flexibility training 2-3 days a week, and Establish a home exercise program before discharge    Exercise Interventions/Education:   1/31/2025 Could not progress exercise due to lack of attended sessions.    Other Core Components/Risk Factor Reassessment:   Medication adherence  Medication compliance: Yes  Uses pill box/organizer: Yes   Carries medication list: Yes   Current Medications:   Medication Documentation Review Audit       Reviewed by Macarena Hernandez RN (Registered Nurse) on 01/23/25 at 1336      Medication Order Taking? Sig Documenting Provider Last Dose Status   atorvastatin (Lipitor) 80 mg tablet [id number] Yes Take 1 tablet (80 mg) by mouth once daily. Sea Starr MD  Active   baclofen (Lioresal) 20 mg tablet [id number] Yes Take 1 tablet (20 mg) by mouth 3 times a day. Stop methocarbamol Historical MD Matty  Active   blood pressure monitor (Blood Pressure Kit) kit [id number] Yes Take blood pressure 1-2 times a day Georgette Ortiz MD PhD  Active   blood sugar diagnostic strip [id number] Yes Use as instructed Sea Starr MD  Active   dapagliflozin propanediol (Farxiga) 10 mg [id number] Yes Take 1 tablet (10 mg) by mouth once daily. Georgette Ortiz MD PhD  Active   diclofenac sodium (Voltaren) 1 % gel [id number] Yes Apply 4.5 inches (4 g) topically. Sea Starr MD  Active   lidocaine (lidocaine HCL) 4 % cream [id number] Yes Apply thin layer to affected area every 4 hours as needed for pain Sea Starr MD  Active   lidocaine 4 % cream [id number]     Patient not taking: Reported on 1/23/2025    Sea Starr MD  Flag for Review     Discontinued 03/04/24 2220   metoprolol succinate XL (Toprol-XL) 100 mg 24 hr tablet [id number] Yes Take 1 tablet (100 mg)  by mouth once daily. Do not crush or chew. Georgette Ortiz MD PhD  Active   mirtazapine (Remeron) 15 mg tablet [id number] Yes Take 1 tablet (15 mg) by mouth once daily at bedtime. Historical Provider, MD  Active   Mounjaro 12.5 mg/0.5 mL pen injector [id number] Yes  Historical Provider, MD  Active   nitrofurantoin, macrocrystal-monohydrate, (Macrobid) 100 mg capsule [id number]     Patient not taking: Reported on 1/23/2025    Historical MD Matty  Flag for Review   OneTouch Delica Plus Lancet 33 gauge misc [id number]   Historical Provider, MD  Active   OneTouch Ultra2 Meter misc [id number]   Historical Provider, MD  Active      Discontinued 01/23/25 1335   polyethylene glycol (Glycolax, Miralax) 17 gram packet [id number] Yes Take 17 g by mouth once daily. Historical MD Matty  Active   sacubitriL-valsartan (Entresto) 49-51 mg tablet [id number] Yes Take 1 tablet by mouth 2 times a day. Georgette Ortiz MD PhD  Active   spironolactone (Aldactone) 25 mg tablet [id number] Yes Take 1 tablet (25 mg) by mouth once daily. Georgette Ortiz MD PhD  Active   tiZANidine (Zanaflex) 4 mg tablet [id number] Yes Take 1 tablet (4 mg) by mouth. Sea Starr MD  Active   topiramate (Topamax) 50 mg tablet [id number] Yes Take 1 tablet (50 mg) by mouth 2 times a day. Historical MD Matty  Active   triamterene-hydrochlorothiazid (Maxzide-25) 37.5-25 mg tablet [id number]     Patient not taking: Reported on 1/23/2025    Historical Provider, MD  Flag for Review                   Is patient prescribed Nitroglycerin? No  Blood Pressure Management  History of Hypertension: Yes   Medication Changes: No   Resting BP:  There were no vitals taken for this visit.      Heart Failure Management  Hx of Heart Failure: Yes, Type (selection): HFrEF  Most recent EF: Echocardiogram - LVEF (%): 30     Onset of heart failure diagnosis: 3/2024  Last heart failure hospitalization: 3/3/24  Number of HF admissions per year: 1    Symptoms: Fatigue  with exertion, Shortness of breath, and Orthopnea (sleeps on stomach)  Is there a family Hx of HF: Yes   Does patient obtain daily weight No , has scale     KCCQ survey: Pre: 76.88  Post: To be done at discharge    Heart Failure Reassessment Heart Failure Symptom Management: Initial Treatment Plan. Will reassess in 30 days.    Heart Failure Goals: Able to verbalize signs and symptoms of fluid retention and when to contact MD, Adhere to proper fluid restrictions, Adapt a low sodium diet and verbalize guidelines, Obtain daily weight and verbalize proper method of obtaining weights    Smoking/Tobacco Assessment  Social History     Tobacco Use    Smoking status: Former     Types: Cigarettes    Smokeless tobacco: Never   Substance Use Topics    Alcohol use: Not Currently    Drug use: Not Currently      Other Core Component Plan  Goal Status: In progress  Other Core Component Goals: Verbalize medication usage and drug actions by discharge and Achieve resting BP of < 130/80 by discharge    Other Core Component Interventions/Education:   To be done in Cardiac Rehab.    Individual Patient Goals:  Resume swimming by discharge.  Establish home exercise regimen by discharge from program.  Goal Status: In progress    Staff Comments:  Leeann has completed 2 sessions of Cardiac Rehab. Patient  has not been consistent with attendance due to visits out of town and holidays. Patient has not changed intensity and duration on Treadmill, NuStep, and Recumbent Cycle modalities.  No cardiac complaints have been voiced and no ectopy has been noted on ECG. BP have been WNL.     Rehab Staff Signature: Kathya Acevedo [facility]-EP

## 2025-02-18 ENCOUNTER — APPOINTMENT (OUTPATIENT)
Dept: CARDIAC REHAB | Facility: HOSPITAL | Age: 65
End: 2025-02-18

## 2025-02-18 ENCOUNTER — TELEPHONE (OUTPATIENT)
Dept: CARDIAC REHAB | Facility: HOSPITAL | Age: 65
End: 2025-02-18
Payer: COMMERCIAL

## 2025-02-18 NOTE — PROGRESS NOTES
PATIENT: Leeann Shankar  DATE: 2/18/2025    Called patient regarding missed cardiac rehab appointment. Patient reports to be in acute right shoulder pain due to torn rotator cuff muscle. Patient wants to hold the rehab for a month.    USHA Lora-EP

## 2025-02-20 ENCOUNTER — APPOINTMENT (OUTPATIENT)
Dept: CARDIAC REHAB | Facility: HOSPITAL | Age: 65
End: 2025-02-20

## 2025-02-25 ENCOUNTER — APPOINTMENT (OUTPATIENT)
Dept: CARDIAC REHAB | Facility: HOSPITAL | Age: 65
End: 2025-02-25

## 2025-02-27 ENCOUNTER — APPOINTMENT (OUTPATIENT)
Dept: CARDIAC REHAB | Facility: HOSPITAL | Age: 65
End: 2025-02-27

## 2025-02-28 ENCOUNTER — DOCUMENTATION (OUTPATIENT)
Dept: CARDIAC REHAB | Facility: HOSPITAL | Age: 65
End: 2025-02-28
Payer: COMMERCIAL

## 2025-02-28 NOTE — PROGRESS NOTES
Cardiac Rehabilitation 60 Day Reassessment    Name: Leeann Shankar   Medical Record Number: [id number]  YOB: 1960   Age: 64 y.o.  Today’s Date: 2/28/2025   Primary Care Physician: No primary care provider on file.   Referring Physician: Georgette Ortiz MD*   Program Location: [address]  Primary Diagnosis:   1. Hypertensive heart disease with heart failure  Referral to Cardiac Rehab    Follow Up In Cardiac Rehab         Onset/Date of Diagnosis: 03/04/24   Session #: 2  AACVPR Risk Stratification: High   Falls Risk: Low    Psychosocial Reassessment:    Pre PHQ-9: 7  No data recorded     Sent PH-Q 9 to MD if score > 20: No; score < 20    Pt reported/currently experiencing stress: Yes; Anxiety; Severity: moderate and Depression; Severity: moderate  Patient uses stress management skills: Yes , prayer  History of:  anxiety and depression  Currently seeing a mental health provider: No  Social Support: Yes, Whom:daughter (aid), kids, grandchildren  Quality of Life Survey: KCCQ (see Heart Failure Management below)    Learning Assessment:  Learning assessment/barriers: Work  Preferred learning method: Auditory, Visual, Reading handout, and Writing handout  Barriers: None  Comments:    Stages of Change: Action    Psychosocial Plan  Goal Status: In progress  Psychosocial Goals: Demonstrating proper techniques for stress management, Maintain or lower PH-Q 9 score by discharge, and Identify strategies for managing depression    Psychosocial Interventions/Education:   1/31/2025 Could not reassess stress due to lack of attendance.  02/28/2025 Could not reassess stress due to lack of attendance.    Nutrition Reassessment:     Diet Habit Survey: Picture Your Plate  Pre: Survey not yet returned by patient.  Post: To be done at discharge.    Survey results reviewed with Dietician: Not at this time    Lipids Assessment  Current Dietary Guidelines:  Regular  Barriers to dietary change:  no  Hyperlipidemia: Yes , takes atorvastatin  Lab Results   Component Value Date    CHOL 127 10/11/2024    HDL 45.0 10/11/2024    LDLCALC 69 10/11/2024    TRIG 67 10/11/2024     Diabetes Assessment  History of Diabetes: Yes Pt monitors BS at home: Yes   Frequency: every other /day  FBS range: < 150  Hypoglycemic Episodes: No   Lab Results   Component Value Date    HGBA1C 8.1 (H) 08/13/2024      Weight Management        Nutrition Plan  Goal Status: In progress  Nutrition Goals: Improve Diet Habit Survey score by 5-10 points by discharge, Adapt a low-sodium, DASH diet prior to discharge, Adapt a Mediterranean focused diet prior to discharge, Learn how to read and interpret nutrition labels prior to discharge, Lose 1lb/week while enrolled in program, Improve HgbA1C prior to discharge, Check blood glucose daily, and Verbalize S/S of hypoglycemia or hyperglycemia by discharge    Nutrition Interventions/Education:   1/31/2025 See the staff comments below.  02/25/2025 Could not reassess due to lack of attendance.    Exercise Reassessment:   Current Home Exercise: Has full gym at home in basement (bike, treadmill, row machine, and weights); also swims but has not gone for the past month  Current Home Exercise?: No  Mode: NA  Frequency: NA  Duration: NA     Exercise Prescription  Exercise Prescription based on:   [facility] Activity Status Index (DASI)  DASI Score: 7.2   MET Score: 3.6     Patient health history  Frequency:  2 days/week  Mode: Treadmill, NuStep, and Recumbent Cycle  Duration: 24 total aerobic minutes  Intensity: RPE 12-16  Target HR:  To be calculated after 6 attended sessions.  MET Level: 2.6  Patient wears supplemental O2: No   Modality Workload METs Duration (minutes)   1 Pre-Exercise      2 Session Warm Up   5 : 00   3 Treadmill 2.0 mph @ 0 % 2.6 8 : 00   4 NuStep 4000 3 load @ 52 treadwell 2.6 8 : 00   5 Recumbent Bike 2 load @ 55 rpms 2.2 8 : 00   6 Post-Exercise         Resistance Training: No   Home Exercise  Prescription given: To be given prior to discharge from program.    Exercise Plan  Goal Status: In progress  Exercise Goals: Increase exercise MET level by 5-10% each week, Increase total exercise duration to 30-45 minutes, Obtain 150 minutes/week of moderate intensity aerobic exercise, Initiate strength training 2-3 days a week, Initiate flexibility training 2-3 days a week, and Establish a home exercise program before discharge    Exercise Interventions/Education:   1/31/2025 Could not progress exercise due to lack of attended sessions.  2/25/2025 Could not progress exercise due to lack of attended sessions.    Other Core Components/Risk Factor Reassessment:   Medication adherence  Medication compliance: Yes  Uses pill box/organizer: Yes   Carries medication list: Yes   Current Medications:   Medication Documentation Review Audit       Reviewed by Macarena Hernandez RN (Registered Nurse) on 01/23/25 at 1336      Medication Order Taking? Sig Documenting Provider Last Dose Status   atorvastatin (Lipitor) 80 mg tablet [id number] Yes Take 1 tablet (80 mg) by mouth once daily. Historical Provider, MD  Active   baclofen (Lioresal) 20 mg tablet [id number] Yes Take 1 tablet (20 mg) by mouth 3 times a day. Stop methocarbamol Historical Provider, MD  Active   blood pressure monitor (Blood Pressure Kit) kit [id number] Yes Take blood pressure 1-2 times a day Georgette Ortiz MD PhD  Active   blood sugar diagnostic strip [id number] Yes Use as instructed Historical MD Matty  Active   dapagliflozin propanediol (Farxiga) 10 mg [id number] Yes Take 1 tablet (10 mg) by mouth once daily. Georgette Ortiz MD PhD  Active   diclofenac sodium (Voltaren) 1 % gel [id number] Yes Apply 4.5 inches (4 g) topically. Historical Provider, MD  Active   lidocaine (lidocaine HCL) 4 % cream [id number] Yes Apply thin layer to affected area every 4 hours as needed for pain Historical Provider, MD  Active   lidocaine 4 % cream [id number]     Patient not  taking: Reported on 1/23/2025    Sea Starr MD  Flag for Review     Discontinued 03/04/24 2219   metoprolol succinate XL (Toprol-XL) 100 mg 24 hr tablet [id number] Yes Take 1 tablet (100 mg) by mouth once daily. Do not crush or chew. Georgette Ortiz MD PhD  Active   mirtazapine (Remeron) 15 mg tablet [id number] Yes Take 1 tablet (15 mg) by mouth once daily at bedtime. Historical MD Matty  Active   Mounjaro 12.5 mg/0.5 mL pen injector [id number] Yes  Historical Provider, MD  Active   nitrofurantoin, macrocrystal-monohydrate, (Macrobid) 100 mg capsule [id number]     Patient not taking: Reported on 1/23/2025    Sea Starr MD  Flag for Review   OneTouch Delica Plus Lancet 33 gauge misc [id number]   Historical Provider, MD  Active   OneTouch Ultra2 Meter misc [id number]   Historical MD Matty  Active      Discontinued 01/23/25 1335   polyethylene glycol (Glycolax, Miralax) 17 gram packet [id number] Yes Take 17 g by mouth once daily. Historical MD Matty  Active   sacubitriL-valsartan (Entresto) 49-51 mg tablet [id number] Yes Take 1 tablet by mouth 2 times a day. Georgette Ortiz MD PhD  Active   spironolactone (Aldactone) 25 mg tablet [id number] Yes Take 1 tablet (25 mg) by mouth once daily. Georgette Ortiz MD PhD  Active   tiZANidine (Zanaflex) 4 mg tablet [id number] Yes Take 1 tablet (4 mg) by mouth. Sea Starr MD  Active   topiramate (Topamax) 50 mg tablet [id number] Yes Take 1 tablet (50 mg) by mouth 2 times a day. Historical Provider, MD  Active   triamterene-hydrochlorothiazid (Maxzide-25) 37.5-25 mg tablet [id number]     Patient not taking: Reported on 1/23/2025    Sea Starr MD  Flag for Review                   Is patient prescribed Nitroglycerin? No  Blood Pressure Management  History of Hypertension: Yes   Medication Changes: No   Resting BP:  There were no vitals taken for this visit.      Heart Failure Management  Hx of Heart Failure: Yes, Type (selection):  HFrEF  Most recent EF: Echocardiogram - LVEF (%): 30     Onset of heart failure diagnosis: 3/2024  Last heart failure hospitalization: 3/3/24  Number of HF admissions per year: 1    Symptoms: Fatigue with exertion, Shortness of breath, and Orthopnea (sleeps on stomach)  Is there a family Hx of HF: Yes   Does patient obtain daily weight No , has scale     KCCQ survey: Pre: 76.88  Post: To be done at discharge    Heart Failure Reassessment Heart Failure Symptom Management: Initial Treatment Plan. Will reassess in 30 days.    Heart Failure Goals: Able to verbalize signs and symptoms of fluid retention and when to contact MD, Adhere to proper fluid restrictions, Adapt a low sodium diet and verbalize guidelines, Obtain daily weight and verbalize proper method of obtaining weights    Smoking/Tobacco Assessment  Social History     Tobacco Use    Smoking status: Former     Types: Cigarettes    Smokeless tobacco: Never   Substance Use Topics    Alcohol use: Not Currently    Drug use: Not Currently      Other Core Component Plan  Goal Status: In progress  Other Core Component Goals: Verbalize medication usage and drug actions by discharge and Achieve resting BP of < 130/80 by discharge    Other Core Component Interventions/Education:   02/25/2025 Could not reassess due to lack of attendance.    Individual Patient Goals:  Resume swimming by discharge.  Establish home exercise regimen by discharge from program.  Goal Status: In progress    Staff Comments:  Leeann has completed 2 sessions of Cardiac Rehab. Patient  has not been consistent with attendance. Upon contact, patient reports to be in acute right shoulder pain due to torn rotator cuff muscle. Patient wants to hold the rehab for a month.  Patient has not changed intensity and duration on Treadmill, NuStep, and Recumbent Cycle modalities.  No cardiac complaints have been voiced and no ectopy has been noted on ECG. BP have been WNL.     Rehab Staff Signature: Kathya FERNANDEZ  [place], [facility]EP

## 2025-03-04 ENCOUNTER — APPOINTMENT (OUTPATIENT)
Dept: CARDIAC REHAB | Facility: HOSPITAL | Age: 65
End: 2025-03-04
Payer: MEDICARE

## 2025-03-06 ENCOUNTER — APPOINTMENT (OUTPATIENT)
Dept: CARDIAC REHAB | Facility: HOSPITAL | Age: 65
End: 2025-03-06
Payer: MEDICARE

## 2025-03-11 ENCOUNTER — APPOINTMENT (OUTPATIENT)
Dept: CARDIAC REHAB | Facility: HOSPITAL | Age: 65
End: 2025-03-11
Payer: MEDICARE

## 2025-03-13 ENCOUNTER — APPOINTMENT (OUTPATIENT)
Dept: CARDIAC REHAB | Facility: HOSPITAL | Age: 65
End: 2025-03-13
Payer: MEDICARE

## 2025-03-14 ENCOUNTER — CLINICAL SUPPORT (OUTPATIENT)
Dept: EMERGENCY MEDICINE | Facility: HOSPITAL | Age: 65
End: 2025-03-14
Payer: MEDICARE

## 2025-03-14 ENCOUNTER — APPOINTMENT (OUTPATIENT)
Dept: RADIOLOGY | Facility: HOSPITAL | Age: 65
End: 2025-03-14
Payer: MEDICARE

## 2025-03-14 ENCOUNTER — HOSPITAL ENCOUNTER (INPATIENT)
Facility: HOSPITAL | Age: 65
End: 2025-03-14
Attending: EMERGENCY MEDICINE | Admitting: NURSE PRACTITIONER
Payer: MEDICARE

## 2025-03-14 DIAGNOSIS — N18.9 ACUTE KIDNEY INJURY SUPERIMPOSED ON CKD: Primary | ICD-10-CM

## 2025-03-14 DIAGNOSIS — I50.23 ACUTE ON CHRONIC SYSTOLIC HEART FAILURE: ICD-10-CM

## 2025-03-14 DIAGNOSIS — N17.9 AKI (ACUTE KIDNEY INJURY): ICD-10-CM

## 2025-03-14 DIAGNOSIS — E87.20 NORMAL ANION GAP METABOLIC ACIDOSIS: ICD-10-CM

## 2025-03-14 DIAGNOSIS — E78.5 DYSLIPIDEMIA: ICD-10-CM

## 2025-03-14 DIAGNOSIS — R06.09 DYSPNEA ON EXERTION: ICD-10-CM

## 2025-03-14 DIAGNOSIS — I73.9 PERIPHERAL VASCULAR DISEASE (CMS-HCC): ICD-10-CM

## 2025-03-14 DIAGNOSIS — N17.9 ACUTE KIDNEY INJURY SUPERIMPOSED ON CKD: Primary | ICD-10-CM

## 2025-03-14 LAB
ALBUMIN SERPL BCP-MCNC: 4.8 G/DL (ref 3.4–5)
ALP SERPL-CCNC: 101 U/L (ref 33–136)
ALT SERPL W P-5'-P-CCNC: 18 U/L (ref 7–45)
ANION GAP SERPL CALC-SCNC: 16 MMOL/L (ref 10–20)
AST SERPL W P-5'-P-CCNC: 16 U/L (ref 9–39)
ATRIAL RATE: 106 BPM
ATRIAL RATE: 92 BPM
BASOPHILS # BLD AUTO: 0.05 X10*3/UL (ref 0–0.1)
BASOPHILS NFR BLD AUTO: 0.7 %
BILIRUB SERPL-MCNC: 0.3 MG/DL (ref 0–1.2)
BNP SERPL-MCNC: 2 PG/ML (ref 0–99)
BUN SERPL-MCNC: 50 MG/DL (ref 6–23)
CALCIUM SERPL-MCNC: 10.1 MG/DL (ref 8.6–10.6)
CARDIAC TROPONIN I PNL SERPL HS: 6 NG/L (ref 0–34)
CARDIAC TROPONIN I PNL SERPL HS: 6 NG/L (ref 0–34)
CHLORIDE SERPL-SCNC: 109 MMOL/L (ref 98–107)
CO2 SERPL-SCNC: 16 MMOL/L (ref 21–32)
CREAT SERPL-MCNC: 2.05 MG/DL (ref 0.5–1.05)
D DIMER PPP FEU-MCNC: 391 NG/ML FEU
EGFRCR SERPLBLD CKD-EPI 2021: 27 ML/MIN/1.73M*2
EOSINOPHIL # BLD AUTO: 0.21 X10*3/UL (ref 0–0.7)
EOSINOPHIL NFR BLD AUTO: 3.1 %
ERYTHROCYTE [DISTWIDTH] IN BLOOD BY AUTOMATED COUNT: 15.1 % (ref 11.5–14.5)
FLUAV RNA RESP QL NAA+PROBE: NOT DETECTED
FLUBV RNA RESP QL NAA+PROBE: NOT DETECTED
GLUCOSE SERPL-MCNC: 100 MG/DL (ref 74–99)
HCT VFR BLD AUTO: 37.4 % (ref 36–46)
HGB BLD-MCNC: 12.1 G/DL (ref 12–16)
IMM GRANULOCYTES # BLD AUTO: 0.02 X10*3/UL (ref 0–0.7)
IMM GRANULOCYTES NFR BLD AUTO: 0.3 % (ref 0–0.9)
LYMPHOCYTES # BLD AUTO: 3.17 X10*3/UL (ref 1.2–4.8)
LYMPHOCYTES NFR BLD AUTO: 46.5 %
MAGNESIUM SERPL-MCNC: 2.6 MG/DL (ref 1.6–2.4)
MCH RBC QN AUTO: 25.2 PG (ref 26–34)
MCHC RBC AUTO-ENTMCNC: 32.4 G/DL (ref 32–36)
MCV RBC AUTO: 78 FL (ref 80–100)
MONOCYTES # BLD AUTO: 0.63 X10*3/UL (ref 0.1–1)
MONOCYTES NFR BLD AUTO: 9.3 %
NEUTROPHILS # BLD AUTO: 2.73 X10*3/UL (ref 1.2–7.7)
NEUTROPHILS NFR BLD AUTO: 40.1 %
NRBC BLD-RTO: 0 /100 WBCS (ref 0–0)
P AXIS: 51 DEGREES
P AXIS: 67 DEGREES
P OFFSET: 181 MS
P OFFSET: 187 MS
P ONSET: 141 MS
P ONSET: 146 MS
PLATELET # BLD AUTO: 372 X10*3/UL (ref 150–450)
POTASSIUM SERPL-SCNC: 5.5 MMOL/L (ref 3.5–5.3)
PR INTERVAL: 142 MS
PR INTERVAL: 172 MS
PROT SERPL-MCNC: 8.1 G/DL (ref 6.4–8.2)
Q ONSET: 217 MS
Q ONSET: 227 MS
QRS COUNT: 15 BEATS
QRS COUNT: 17 BEATS
QRS DURATION: 66 MS
QRS DURATION: 86 MS
QT INTERVAL: 326 MS
QT INTERVAL: 344 MS
QTC CALCULATION(BAZETT): 425 MS
QTC CALCULATION(BAZETT): 433 MS
QTC FREDERICIA: 393 MS
QTC FREDERICIA: 396 MS
R AXIS: 15 DEGREES
R AXIS: 41 DEGREES
RBC # BLD AUTO: 4.8 X10*6/UL (ref 4–5.2)
RSV RNA RESP QL NAA+PROBE: NOT DETECTED
SARS-COV-2 RNA RESP QL NAA+PROBE: NOT DETECTED
SODIUM SERPL-SCNC: 135 MMOL/L (ref 136–145)
T AXIS: -3 DEGREES
T AXIS: 73 DEGREES
T OFFSET: 389 MS
T OFFSET: 390 MS
VENTRICULAR RATE: 106 BPM
VENTRICULAR RATE: 92 BPM
WBC # BLD AUTO: 6.8 X10*3/UL (ref 4.4–11.3)

## 2025-03-14 PROCEDURE — 85018 HEMOGLOBIN: CPT

## 2025-03-14 PROCEDURE — 83735 ASSAY OF MAGNESIUM: CPT

## 2025-03-14 PROCEDURE — 80053 COMPREHEN METABOLIC PANEL: CPT

## 2025-03-14 PROCEDURE — 85025 COMPLETE CBC W/AUTO DIFF WBC: CPT

## 2025-03-14 PROCEDURE — 93005 ELECTROCARDIOGRAM TRACING: CPT

## 2025-03-14 PROCEDURE — 82550 ASSAY OF CK (CPK): CPT | Performed by: NURSE PRACTITIONER

## 2025-03-14 PROCEDURE — 36410 VNPNXR 3YR/> PHY/QHP DX/THER: CPT

## 2025-03-14 PROCEDURE — 83880 ASSAY OF NATRIURETIC PEPTIDE: CPT

## 2025-03-14 PROCEDURE — 84484 ASSAY OF TROPONIN QUANT: CPT

## 2025-03-14 PROCEDURE — 87637 SARSCOV2&INF A&B&RSV AMP PRB: CPT

## 2025-03-14 PROCEDURE — 36415 COLL VENOUS BLD VENIPUNCTURE: CPT

## 2025-03-14 PROCEDURE — 93308 TTE F-UP OR LMTD: CPT | Performed by: EMERGENCY MEDICINE

## 2025-03-14 PROCEDURE — 71046 X-RAY EXAM CHEST 2 VIEWS: CPT

## 2025-03-14 PROCEDURE — 85379 FIBRIN DEGRADATION QUANT: CPT | Performed by: EMERGENCY MEDICINE

## 2025-03-14 PROCEDURE — 99285 EMERGENCY DEPT VISIT HI MDM: CPT | Mod: 25 | Performed by: EMERGENCY MEDICINE

## 2025-03-14 PROCEDURE — 71046 X-RAY EXAM CHEST 2 VIEWS: CPT | Performed by: STUDENT IN AN ORGANIZED HEALTH CARE EDUCATION/TRAINING PROGRAM

## 2025-03-14 PROCEDURE — 83930 ASSAY OF BLOOD OSMOLALITY: CPT | Performed by: NURSE PRACTITIONER

## 2025-03-14 RX ORDER — CALCIUM GLUCONATE 20 MG/ML
2 INJECTION, SOLUTION INTRAVENOUS ONCE
Status: COMPLETED | OUTPATIENT
Start: 2025-03-14 | End: 2025-03-15

## 2025-03-14 RX ORDER — INDOMETHACIN 25 MG/1
50 CAPSULE ORAL ONCE
Status: COMPLETED | OUTPATIENT
Start: 2025-03-14 | End: 2025-03-15

## 2025-03-14 RX ORDER — DEXTROSE 50 % IN WATER (D50W) INTRAVENOUS SYRINGE
25 ONCE
Status: COMPLETED | OUTPATIENT
Start: 2025-03-14 | End: 2025-03-15

## 2025-03-14 ASSESSMENT — PAIN DESCRIPTION - DESCRIPTORS: DESCRIPTORS: PRESSURE

## 2025-03-15 ENCOUNTER — APPOINTMENT (OUTPATIENT)
Dept: CARDIOLOGY | Facility: HOSPITAL | Age: 65
End: 2025-03-15
Payer: MEDICARE

## 2025-03-15 PROBLEM — N18.9 ACUTE KIDNEY INJURY SUPERIMPOSED ON CKD: Status: ACTIVE | Noted: 2025-03-15

## 2025-03-15 PROBLEM — I73.9 PERIPHERAL VASCULAR DISEASE (CMS-HCC): Status: ACTIVE | Noted: 2025-03-15

## 2025-03-15 PROBLEM — N17.9 ACUTE KIDNEY INJURY SUPERIMPOSED ON CKD (CMS-HCC): Status: ACTIVE | Noted: 2025-03-15

## 2025-03-15 LAB
ALBUMIN SERPL BCP-MCNC: 3.5 G/DL (ref 3.4–5)
ALBUMIN SERPL BCP-MCNC: 3.8 G/DL (ref 3.4–5)
ANION GAP SERPL CALC-SCNC: 12 MMOL/L (ref 10–20)
ANION GAP SERPL CALC-SCNC: 13 MMOL/L (ref 10–20)
AORTIC VALVE PEAK VELOCITY: 1.36 M/S
AV PEAK GRADIENT: 7 MMHG
AVA (PEAK VEL): 2.82 CM2
BUN SERPL-MCNC: 37 MG/DL (ref 6–23)
BUN SERPL-MCNC: 46 MG/DL (ref 6–23)
CALCIUM SERPL-MCNC: 7.8 MG/DL (ref 8.6–10.6)
CALCIUM SERPL-MCNC: 9 MG/DL (ref 8.6–10.6)
CHLORIDE SERPL-SCNC: 111 MMOL/L (ref 98–107)
CHLORIDE SERPL-SCNC: 116 MMOL/L (ref 98–107)
CHLORIDE UR-SCNC: 108 MMOL/L
CHLORIDE/CREATININE (MMOL/G) IN URINE: 114 MMOL/G CREAT (ref 38–318)
CK SERPL-CCNC: 154 U/L (ref 0–215)
CO2 SERPL-SCNC: 13 MMOL/L (ref 21–32)
CO2 SERPL-SCNC: 17 MMOL/L (ref 21–32)
CREAT SERPL-MCNC: 1.54 MG/DL (ref 0.5–1.05)
CREAT SERPL-MCNC: 1.76 MG/DL (ref 0.5–1.05)
CREAT UR-MCNC: 94.8 MG/DL (ref 20–320)
EGFRCR SERPLBLD CKD-EPI 2021: 32 ML/MIN/1.73M*2
EGFRCR SERPLBLD CKD-EPI 2021: 38 ML/MIN/1.73M*2
EJECTION FRACTION APICAL 4 CHAMBER: 52.9
EJECTION FRACTION: 50 %
ERYTHROCYTE [DISTWIDTH] IN BLOOD BY AUTOMATED COUNT: 15.2 % (ref 11.5–14.5)
GLUCOSE BLD MANUAL STRIP-MCNC: 100 MG/DL (ref 74–99)
GLUCOSE BLD MANUAL STRIP-MCNC: 100 MG/DL (ref 74–99)
GLUCOSE BLD MANUAL STRIP-MCNC: 106 MG/DL (ref 74–99)
GLUCOSE BLD MANUAL STRIP-MCNC: 82 MG/DL (ref 74–99)
GLUCOSE SERPL-MCNC: 112 MG/DL (ref 74–99)
GLUCOSE SERPL-MCNC: 118 MG/DL (ref 74–99)
HCT VFR BLD AUTO: 31.2 % (ref 36–46)
HGB BLD-MCNC: 10.3 G/DL (ref 12–16)
LEFT ATRIUM VOLUME AREA LENGTH INDEX BSA: 24.9 ML/M2
LEFT VENTRICLE INTERNAL DIMENSION DIASTOLE: 5.1 CM (ref 3.5–6)
LEFT VENTRICULAR OUTFLOW TRACT DIAMETER: 2.2 CM
MAGNESIUM SERPL-MCNC: 2.14 MG/DL (ref 1.6–2.4)
MCH RBC QN AUTO: 25.1 PG (ref 26–34)
MCHC RBC AUTO-ENTMCNC: 33 G/DL (ref 32–36)
MCV RBC AUTO: 76 FL (ref 80–100)
MITRAL VALVE E/A RATIO: 0.7
NRBC BLD-RTO: 0 /100 WBCS (ref 0–0)
OSMOLALITY SERPL: 303 MOSM/KG (ref 280–300)
OSMOLALITY UR: 551 MOSM/KG (ref 200–1200)
PHOSPHATE SERPL-MCNC: 4 MG/DL (ref 2.5–4.9)
PHOSPHATE SERPL-MCNC: 4.4 MG/DL (ref 2.5–4.9)
PLATELET # BLD AUTO: 221 X10*3/UL (ref 150–450)
POTASSIUM SERPL-SCNC: 4.4 MMOL/L (ref 3.5–5.3)
POTASSIUM SERPL-SCNC: 5.2 MMOL/L (ref 3.5–5.3)
POTASSIUM UR-SCNC: 24 MMOL/L
POTASSIUM/CREAT UR-RTO: 25 MMOL/G CREAT
RBC # BLD AUTO: 4.11 X10*6/UL (ref 4–5.2)
RIGHT VENTRICLE FREE WALL PEAK S': 18.1 CM/S
SODIUM SERPL-SCNC: 136 MMOL/L (ref 136–145)
SODIUM SERPL-SCNC: 137 MMOL/L (ref 136–145)
SODIUM UR-SCNC: 126 MMOL/L
SODIUM/CREAT UR-RTO: 133 MMOL/G CREAT
TRICUSPID ANNULAR PLANE SYSTOLIC EXCURSION: 2 CM
WBC # BLD AUTO: 6.2 X10*3/UL (ref 4.4–11.3)

## 2025-03-15 PROCEDURE — 2500000004 HC RX 250 GENERAL PHARMACY W/ HCPCS (ALT 636 FOR OP/ED): Performed by: EMERGENCY MEDICINE

## 2025-03-15 PROCEDURE — 83735 ASSAY OF MAGNESIUM: CPT | Performed by: NURSE PRACTITIONER

## 2025-03-15 PROCEDURE — 82947 ASSAY GLUCOSE BLOOD QUANT: CPT

## 2025-03-15 PROCEDURE — 93306 TTE W/DOPPLER COMPLETE: CPT | Performed by: INTERNAL MEDICINE

## 2025-03-15 PROCEDURE — 83935 ASSAY OF URINE OSMOLALITY: CPT | Performed by: NURSE PRACTITIONER

## 2025-03-15 PROCEDURE — C8929 TTE W OR WO FOL WCON,DOPPLER: HCPCS

## 2025-03-15 PROCEDURE — 2500000004 HC RX 250 GENERAL PHARMACY W/ HCPCS (ALT 636 FOR OP/ED): Performed by: NURSE PRACTITIONER

## 2025-03-15 PROCEDURE — 36415 COLL VENOUS BLD VENIPUNCTURE: CPT | Performed by: NURSE PRACTITIONER

## 2025-03-15 PROCEDURE — 1200000002 HC GENERAL ROOM WITH TELEMETRY DAILY

## 2025-03-15 PROCEDURE — 99223 1ST HOSP IP/OBS HIGH 75: CPT | Performed by: NURSE PRACTITIONER

## 2025-03-15 PROCEDURE — 82570 ASSAY OF URINE CREATININE: CPT | Performed by: STUDENT IN AN ORGANIZED HEALTH CARE EDUCATION/TRAINING PROGRAM

## 2025-03-15 PROCEDURE — 2500000004 HC RX 250 GENERAL PHARMACY W/ HCPCS (ALT 636 FOR OP/ED): Performed by: STUDENT IN AN ORGANIZED HEALTH CARE EDUCATION/TRAINING PROGRAM

## 2025-03-15 PROCEDURE — 2500000001 HC RX 250 WO HCPCS SELF ADMINISTERED DRUGS (ALT 637 FOR MEDICARE OP): Performed by: NURSE PRACTITIONER

## 2025-03-15 PROCEDURE — 82570 ASSAY OF URINE CREATININE: CPT | Performed by: NURSE PRACTITIONER

## 2025-03-15 PROCEDURE — 36415 COLL VENOUS BLD VENIPUNCTURE: CPT | Performed by: STUDENT IN AN ORGANIZED HEALTH CARE EDUCATION/TRAINING PROGRAM

## 2025-03-15 PROCEDURE — 80069 RENAL FUNCTION PANEL: CPT | Performed by: NURSE PRACTITIONER

## 2025-03-15 PROCEDURE — 2500000002 HC RX 250 W HCPCS SELF ADMINISTERED DRUGS (ALT 637 FOR MEDICARE OP, ALT 636 FOR OP/ED): Performed by: EMERGENCY MEDICINE

## 2025-03-15 PROCEDURE — 96375 TX/PRO/DX INJ NEW DRUG ADDON: CPT | Mod: 59

## 2025-03-15 PROCEDURE — 96374 THER/PROPH/DIAG INJ IV PUSH: CPT | Mod: 59

## 2025-03-15 PROCEDURE — 2500000005 HC RX 250 GENERAL PHARMACY W/O HCPCS: Performed by: EMERGENCY MEDICINE

## 2025-03-15 PROCEDURE — 85027 COMPLETE CBC AUTOMATED: CPT | Performed by: NURSE PRACTITIONER

## 2025-03-15 PROCEDURE — 80069 RENAL FUNCTION PANEL: CPT | Performed by: STUDENT IN AN ORGANIZED HEALTH CARE EDUCATION/TRAINING PROGRAM

## 2025-03-15 RX ORDER — TALC
6 POWDER (GRAM) TOPICAL NIGHTLY PRN
Status: DISCONTINUED | OUTPATIENT
Start: 2025-03-15 | End: 2025-03-19 | Stop reason: HOSPADM

## 2025-03-15 RX ORDER — MIRTAZAPINE 15 MG/1
15 TABLET, FILM COATED ORAL NIGHTLY
Status: DISCONTINUED | OUTPATIENT
Start: 2025-03-15 | End: 2025-03-19 | Stop reason: HOSPADM

## 2025-03-15 RX ORDER — DEXTROSE 50 % IN WATER (D50W) INTRAVENOUS SYRINGE
25
Status: DISCONTINUED | OUTPATIENT
Start: 2025-03-15 | End: 2025-03-19 | Stop reason: HOSPADM

## 2025-03-15 RX ORDER — TOPIRAMATE 50 MG/1
50 TABLET, FILM COATED ORAL 2 TIMES DAILY
Status: DISCONTINUED | OUTPATIENT
Start: 2025-03-15 | End: 2025-03-19 | Stop reason: HOSPADM

## 2025-03-15 RX ORDER — POLYETHYLENE GLYCOL 3350 17 G/17G
17 POWDER, FOR SOLUTION ORAL DAILY
Status: DISCONTINUED | OUTPATIENT
Start: 2025-03-15 | End: 2025-03-19 | Stop reason: HOSPADM

## 2025-03-15 RX ORDER — INSULIN LISPRO 100 [IU]/ML
0-10 INJECTION, SOLUTION INTRAVENOUS; SUBCUTANEOUS
Status: DISCONTINUED | OUTPATIENT
Start: 2025-03-15 | End: 2025-03-19 | Stop reason: HOSPADM

## 2025-03-15 RX ORDER — LOSARTAN POTASSIUM 25 MG/1
25 TABLET ORAL DAILY
COMMUNITY
Start: 2025-02-17 | End: 2025-03-15 | Stop reason: ALTCHOICE

## 2025-03-15 RX ORDER — TIRZEPATIDE 15 MG/.5ML
15 INJECTION, SOLUTION SUBCUTANEOUS WEEKLY
COMMUNITY
Start: 2025-02-01

## 2025-03-15 RX ORDER — SODIUM BICARBONATE 650 MG/1
650 TABLET ORAL 2 TIMES DAILY
Status: DISCONTINUED | OUTPATIENT
Start: 2025-03-15 | End: 2025-03-16

## 2025-03-15 RX ORDER — DEXTROSE 50 % IN WATER (D50W) INTRAVENOUS SYRINGE
12.5
Status: DISCONTINUED | OUTPATIENT
Start: 2025-03-15 | End: 2025-03-19 | Stop reason: HOSPADM

## 2025-03-15 RX ORDER — HEPARIN SODIUM 5000 [USP'U]/ML
7500 INJECTION, SOLUTION INTRAVENOUS; SUBCUTANEOUS EVERY 8 HOURS SCHEDULED
Status: DISCONTINUED | OUTPATIENT
Start: 2025-03-15 | End: 2025-03-19 | Stop reason: HOSPADM

## 2025-03-15 RX ORDER — METOPROLOL SUCCINATE 100 MG/1
100 TABLET, EXTENDED RELEASE ORAL DAILY
Status: DISCONTINUED | OUTPATIENT
Start: 2025-03-15 | End: 2025-03-19 | Stop reason: HOSPADM

## 2025-03-15 RX ORDER — ATORVASTATIN CALCIUM 80 MG/1
80 TABLET, FILM COATED ORAL NIGHTLY
Status: DISCONTINUED | OUTPATIENT
Start: 2025-03-15 | End: 2025-03-19 | Stop reason: HOSPADM

## 2025-03-15 RX ORDER — ACETAMINOPHEN 160 MG/5ML
650 SOLUTION ORAL EVERY 6 HOURS PRN
Status: DISCONTINUED | OUTPATIENT
Start: 2025-03-15 | End: 2025-03-19 | Stop reason: HOSPADM

## 2025-03-15 RX ORDER — ACETAMINOPHEN 325 MG/1
650 TABLET ORAL EVERY 6 HOURS PRN
Status: DISCONTINUED | OUTPATIENT
Start: 2025-03-15 | End: 2025-03-19 | Stop reason: HOSPADM

## 2025-03-15 RX ADMIN — SODIUM CHLORIDE 500 ML: 9 INJECTION, SOLUTION INTRAVENOUS at 02:46

## 2025-03-15 RX ADMIN — INSULIN HUMAN 5 UNITS: 100 INJECTION, SOLUTION PARENTERAL at 01:00

## 2025-03-15 RX ADMIN — DEXTROSE MONOHYDRATE 25 G: 25 INJECTION, SOLUTION INTRAVENOUS at 00:58

## 2025-03-15 RX ADMIN — SODIUM BICARBONATE 650 MG: 650 TABLET ORAL at 08:51

## 2025-03-15 RX ADMIN — METOPROLOL SUCCINATE 100 MG: 50 TABLET, EXTENDED RELEASE ORAL at 08:51

## 2025-03-15 RX ADMIN — CALCIUM GLUCONATE 2 G: 20 INJECTION, SOLUTION INTRAVENOUS at 00:44

## 2025-03-15 RX ADMIN — SODIUM BICARBONATE 50 MEQ: 84 INJECTION, SOLUTION INTRAVENOUS at 00:56

## 2025-03-15 RX ADMIN — SODIUM CHLORIDE 500 ML: 9 INJECTION, SOLUTION INTRAVENOUS at 13:49

## 2025-03-15 RX ADMIN — TOPIRAMATE 50 MG: 50 TABLET, FILM COATED ORAL at 08:51

## 2025-03-15 RX ADMIN — PERFLUTREN 2 ML OF DILUTION: 6.52 INJECTION, SUSPENSION INTRAVENOUS at 09:58

## 2025-03-15 SDOH — HEALTH STABILITY: MENTAL HEALTH: HOW OFTEN DO YOU HAVE A DRINK CONTAINING ALCOHOL?: NEVER

## 2025-03-15 SDOH — SOCIAL STABILITY: SOCIAL INSECURITY: WITHIN THE LAST YEAR, HAVE YOU BEEN HUMILIATED OR EMOTIONALLY ABUSED IN OTHER WAYS BY YOUR PARTNER OR EX-PARTNER?: NO

## 2025-03-15 SDOH — HEALTH STABILITY: MENTAL HEALTH: HOW OFTEN DO YOU HAVE SIX OR MORE DRINKS ON ONE OCCASION?: NEVER

## 2025-03-15 SDOH — ECONOMIC STABILITY: FOOD INSECURITY: WITHIN THE PAST 12 MONTHS, THE FOOD YOU BOUGHT JUST DIDN'T LAST AND YOU DIDN'T HAVE MONEY TO GET MORE.: NEVER TRUE

## 2025-03-15 SDOH — SOCIAL STABILITY: SOCIAL INSECURITY
WITHIN THE LAST YEAR, HAVE YOU BEEN RAPED OR FORCED TO HAVE ANY KIND OF SEXUAL ACTIVITY BY YOUR PARTNER OR EX-PARTNER?: NO

## 2025-03-15 SDOH — SOCIAL STABILITY: SOCIAL INSECURITY
WITHIN THE LAST YEAR, HAVE YOU BEEN KICKED, HIT, SLAPPED, OR OTHERWISE PHYSICALLY HURT BY YOUR PARTNER OR EX-PARTNER?: NO

## 2025-03-15 SDOH — SOCIAL STABILITY: SOCIAL INSECURITY: DOES ANYONE TRY TO KEEP YOU FROM HAVING/CONTACTING OTHER FRIENDS OR DOING THINGS OUTSIDE YOUR HOME?: NO

## 2025-03-15 SDOH — SOCIAL STABILITY: SOCIAL INSECURITY: ARE THERE ANY APPARENT SIGNS OF INJURIES/BEHAVIORS THAT COULD BE RELATED TO ABUSE/NEGLECT?: NO

## 2025-03-15 SDOH — ECONOMIC STABILITY: FOOD INSECURITY: WITHIN THE PAST 12 MONTHS, YOU WORRIED THAT YOUR FOOD WOULD RUN OUT BEFORE YOU GOT THE MONEY TO BUY MORE.: NEVER TRUE

## 2025-03-15 SDOH — SOCIAL STABILITY: SOCIAL INSECURITY: WITHIN THE LAST YEAR, HAVE YOU BEEN AFRAID OF YOUR PARTNER OR EX-PARTNER?: NO

## 2025-03-15 SDOH — SOCIAL STABILITY: SOCIAL INSECURITY: DO YOU FEEL UNSAFE GOING BACK TO THE PLACE WHERE YOU ARE LIVING?: NO

## 2025-03-15 SDOH — SOCIAL STABILITY: SOCIAL INSECURITY: WERE YOU ABLE TO COMPLETE ALL THE BEHAVIORAL HEALTH SCREENINGS?: YES

## 2025-03-15 SDOH — ECONOMIC STABILITY: INCOME INSECURITY: IN THE PAST 12 MONTHS HAS THE ELECTRIC, GAS, OIL, OR WATER COMPANY THREATENED TO SHUT OFF SERVICES IN YOUR HOME?: NO

## 2025-03-15 SDOH — SOCIAL STABILITY: SOCIAL INSECURITY: HAVE YOU HAD THOUGHTS OF HARMING ANYONE ELSE?: NO

## 2025-03-15 SDOH — SOCIAL STABILITY: SOCIAL INSECURITY: DO YOU FEEL ANYONE HAS EXPLOITED OR TAKEN ADVANTAGE OF YOU FINANCIALLY OR OF YOUR PERSONAL PROPERTY?: NO

## 2025-03-15 SDOH — SOCIAL STABILITY: SOCIAL INSECURITY: HAS ANYONE EVER THREATENED TO HURT YOUR FAMILY OR YOUR PETS?: NO

## 2025-03-15 SDOH — SOCIAL STABILITY: SOCIAL INSECURITY: ABUSE: ADULT

## 2025-03-15 SDOH — SOCIAL STABILITY: SOCIAL INSECURITY: ARE YOU OR HAVE YOU BEEN THREATENED OR ABUSED PHYSICALLY, EMOTIONALLY, OR SEXUALLY BY ANYONE?: NO

## 2025-03-15 SDOH — HEALTH STABILITY: MENTAL HEALTH: HOW MANY DRINKS CONTAINING ALCOHOL DO YOU HAVE ON A TYPICAL DAY WHEN YOU ARE DRINKING?: PATIENT DOES NOT DRINK

## 2025-03-15 ASSESSMENT — LIFESTYLE VARIABLES
HAVE YOU EVER FELT YOU SHOULD CUT DOWN ON YOUR DRINKING: NO
EVER FELT BAD OR GUILTY ABOUT YOUR DRINKING: NO
SKIP TO QUESTIONS 9-10: 1
TOTAL SCORE: 0
AUDIT-C TOTAL SCORE: 0
HAVE PEOPLE ANNOYED YOU BY CRITICIZING YOUR DRINKING: NO
EVER HAD A DRINK FIRST THING IN THE MORNING TO STEADY YOUR NERVES TO GET RID OF A HANGOVER: NO

## 2025-03-15 ASSESSMENT — PAIN - FUNCTIONAL ASSESSMENT: PAIN_FUNCTIONAL_ASSESSMENT: 0-10

## 2025-03-15 ASSESSMENT — ENCOUNTER SYMPTOMS
ABDOMINAL PAIN: 0
COUGH: 0
SHORTNESS OF BREATH: 1
FEVER: 0
DIZZINESS: 1
VOMITING: 1
WEAKNESS: 0
DIFFICULTY URINATING: 0
HEADACHES: 0
CHILLS: 0
NAUSEA: 1

## 2025-03-15 ASSESSMENT — PATIENT HEALTH QUESTIONNAIRE - PHQ9
2. FEELING DOWN, DEPRESSED OR HOPELESS: NOT AT ALL
1. LITTLE INTEREST OR PLEASURE IN DOING THINGS: NOT AT ALL
SUM OF ALL RESPONSES TO PHQ9 QUESTIONS 1 & 2: 0

## 2025-03-15 ASSESSMENT — COGNITIVE AND FUNCTIONAL STATUS - GENERAL
WALKING IN HOSPITAL ROOM: A LITTLE
DAILY ACTIVITIY SCORE: 20
MOBILITY SCORE: 20
DRESSING REGULAR UPPER BODY CLOTHING: A LITTLE
TOILETING: A LITTLE
PATIENT BASELINE BEDBOUND: NO
CLIMB 3 TO 5 STEPS WITH RAILING: A LITTLE
PATIENT BASELINE BEDBOUND: NO
DRESSING REGULAR LOWER BODY CLOTHING: A LITTLE
STANDING UP FROM CHAIR USING ARMS: A LITTLE
MOVING TO AND FROM BED TO CHAIR: A LITTLE
HELP NEEDED FOR BATHING: A LITTLE

## 2025-03-15 ASSESSMENT — ACTIVITIES OF DAILY LIVING (ADL)
FEEDING YOURSELF: INDEPENDENT
HEARING - RIGHT EAR: FUNCTIONAL
DRESSING YOURSELF: NEEDS ASSISTANCE
BATHING: NEEDS ASSISTANCE
HEARING - LEFT EAR: FUNCTIONAL
ASSISTIVE_DEVICE: OTHER (COMMENT)
GROOMING: INDEPENDENT
JUDGMENT_ADEQUATE_SAFELY_COMPLETE_DAILY_ACTIVITIES: YES
TOILETING: NEEDS ASSISTANCE
PATIENT'S MEMORY ADEQUATE TO SAFELY COMPLETE DAILY ACTIVITIES?: YES
ADEQUATE_TO_COMPLETE_ADL: YES
LACK_OF_TRANSPORTATION: NO
WALKS IN HOME: NEEDS ASSISTANCE

## 2025-03-15 ASSESSMENT — PAIN SCALES - GENERAL: PAINLEVEL_OUTOF10: 0 - NO PAIN

## 2025-03-15 NOTE — ED PROVIDER NOTES
Emergency Department Provider Note        History of Present Illness     CC: Shortness of Breath     HPI:  Patient is a 64 year old female with a PMH of HFrEF (most recent LVEF 30%) and T2DM presenting with shortness of breath. The patient states her SOB started around 2 weeks ago with an associated cough. She reports SOB at rest and that it increases on exertion, causing her to feel lightheaded. She also states there is associated chest pain on exertion, but that the pain moves around depending on the position she moves in.  She states when she walks she has pain in her thighs, more prominent in her right thigh and describes the pain as if someone is punching her in the leg.  She denies any leg swelling, nausea, vomiting, diarrhea, runny nose, or congestion. She states that this sort of feels similar to episodes in the past where her CHF was troublesome, but that this time it is the worst it has ever been.  She denies any abdominal pain, nausea or vomiting.  Denies any fevers or recent infectious type symptoms.  No other symptoms at this time.    Limitations to history: None  Independent historian(s): None  Records Reviewed: Recent available ED and inpatient notes reviewed in EMR.    PMHx/PSHx:  Per HPI.   - has a past medical history of Diabetes mellitus (Multi), Hyperlipidemia, and Hypertension.  - has no past surgical history on file.    Medications:  Reviewed in EMR. See EMR for complete list of medications and doses.    Allergies:  Lisinopril    Social History:  - Tobacco:  reports that she has quit smoking. Her smoking use included cigarettes. She has never used smokeless tobacco.   - Alcohol:  reports that she does not currently use alcohol.   - Illicit Drugs:  reports that she does not currently use drugs.     ROS:  Per HPI.       Physical Exam     Triage Vitals:  T 36.8 °C (98.2 °F)  HR (!) 105  /60  RR 16  O2 98 %      General: Patient resting in bed, no acute distress, mildly increased work of  breathing, appropriately conversational without confusion or gross mental status changes.  Head: Normocephalic. Atraumatic.  Neck:  FROM. No gross masses.   Eyes: EOMI. No scleral icterus or injection.  ENT: Moist mucous membranes, no apparent trauma or lesions.  CV: Regular rhythm. No murmurs, rubs, gallops appreciated. 2+ radial pulses bilaterally.  Resp: Clear to auscultation bilaterally.  Mildly increased work of breathing with no significant respiratory distress.  GI: Soft, non-distended.  No tenderness with palpation.     EXT: No peripheral edema, contusions, or wounds.  Skin: Warm and dry, no rashes or lesions.  Neuro: Alert and oriented.  No focal neurological deficits.  Equal motor strength in bilateral upper and lower extremities.  Sensation intact throughout.  Speech fluent.      Medical Decision Making & ED Course     Labs:   Labs Reviewed   CBC WITH AUTO DIFFERENTIAL - Abnormal       Result Value    WBC 6.8      nRBC 0.0      RBC 4.80      Hemoglobin 12.1      Hematocrit 37.4      MCV 78 (*)     MCH 25.2 (*)     MCHC 32.4      RDW 15.1 (*)     Platelets 372      Neutrophils % 40.1      Immature Granulocytes %, Automated 0.3      Lymphocytes % 46.5      Monocytes % 9.3      Eosinophils % 3.1      Basophils % 0.7      Neutrophils Absolute 2.73      Immature Granulocytes Absolute, Automated 0.02      Lymphocytes Absolute 3.17      Monocytes Absolute 0.63      Eosinophils Absolute 0.21      Basophils Absolute 0.05     COMPREHENSIVE METABOLIC PANEL - Abnormal    Glucose 100 (*)     Sodium 135 (*)     Potassium 5.5 (*)     Chloride 109 (*)     Bicarbonate 16 (*)     Anion Gap 16      Urea Nitrogen 50 (*)     Creatinine 2.05 (*)     eGFR 27 (*)     Calcium 10.1      Albumin 4.8      Alkaline Phosphatase 101      Total Protein 8.1      AST 16      Bilirubin, Total 0.3      ALT 18     MAGNESIUM - Abnormal    Magnesium 2.60 (*)    B-TYPE NATRIURETIC PEPTIDE - Normal    BNP 2      Narrative:        <100 pg/mL -  Heart failure unlikely  100-299 pg/mL - Intermediate probability of acute heart                  failure exacerbation. Correlate with clinical                  context and patient history.    >=300 pg/mL - Heart Failure likely. Correlate with clinical                  context and patient history.     Biotin interference may cause falsely decreased results. Patients taking a Biotin dose of up to 5 mg/day should refrain from taking Biotin for 24 hours before sample  collection. Providers may contact their local laboratory for further information.   SERIAL TROPONIN-INITIAL - Normal    Troponin I, High Sensitivity (CMC) 6      Narrative:     Less than 99th percentile of normal range cutoff-  Female and children under 18 years old <35 ng/L; Male <54 ng/L: Negative  Repeat testing should be performed if clinically indicated.     Female and children under 18 years old  ng/L; Male  ng/L:  Consistent with possible cardiac damage and possible increased clinical   risk. Serial measurements may help to assess extent of myocardial damage.     >120 ng/L: Consistent with cardiac damage, increased clinical risk and  myocardial infarction. Serial measurements may help assess extent of   myocardial damage.      NOTE: Children less than 1 year old may have higher baseline troponin   levels and results should be interpreted in conjunction with the overall   clinical context.    NOTE: Troponin I testing is performed using a different   testing methodology at [facility] than at other   [facility]. Direct result comparisons should only   be made within the same method.     SERIAL TROPONIN, 1 HOUR - Normal    Troponin I, High Sensitivity (CMC) 6      Narrative:     Less than 99th percentile of normal range cutoff-  Female and children under 18 years old <35 ng/L; Male <54 ng/L: Negative  Repeat testing should be performed if clinically indicated.     Female and children under 18 years old  ng/L; Male   ng/L:  Consistent with possible cardiac damage and possible increased clinical   risk. Serial measurements may help to assess extent of myocardial damage.     >120 ng/L: Consistent with cardiac damage, increased clinical risk and  myocardial infarction. Serial measurements may help assess extent of   myocardial damage.      NOTE: Children less than 1 year old may have higher baseline troponin   levels and results should be interpreted in conjunction with the overall   clinical context.    NOTE: Troponin I testing is performed using a different   testing methodology at [facility] than at [facility]. Direct result comparisons should only   be made within the same method.     INFLUENZA A AND B PCR - Normal    Flu A Result Not Detected      Flu B Result Not Detected      Narrative:     This assay is an in vitro diagnostic multiplex nucleic acid amplification test for the detection and discrimination of Influenza A & B from nasopharyngeal specimens, and has been validated for use at [facility]. Negative results do not preclude Influenza A/B infections, and should not be used as the sole basis for diagnosis, treatment, or other management decisions. If Influenza A/B and RSV PCR results are negative, testing for Parainfluenza virus, Adenovirus and Metapneumovirus is routinely performed for [facility] pediatric oncology and intensive care inpatients, and is available on other patients by placing an add-on request.   SARS-COV-2 PCR - Normal    Coronavirus 2019, PCR Not Detected      Narrative:     This assay is an FDA-cleared, in vitro diagnostic nucleic acid amplification test for the qualitative detection and differentiation of SARS CoV-2 from nasopharyngeal specimens collected from individuals with signs and symptoms of respiratory tract infections, and has been validated for use at [facility]. Negative results do not preclude COVID-19 infections  and should not be used as the sole basis for diagnosis, treatment, or other management decisions. Testing for SARS CoV-2 is recommended only for patients who meet current clinical and/or epidemiological criteria defined by federal, state, or local public health directives.   RSV PCR - Normal    RSV PCR Not Detected      Narrative:     This assay is an FDA-cleared, in vitro diagnostic nucleic acid amplification test for the detection of RSV from nasopharyngeal specimens, and has been validated for use at [facility]. Negative results do not preclude RSV infections, and should not be used as the sole basis for diagnosis, treatment, or other management decisions. If Influenza A/B and RSV PCR results are negative, testing for Parainfluenza virus, Adenovirus and Metapneumovirus is routinely performed for pediatric oncology and intensive care inpatients at [facility], and is available on other patients by placing an add-on request.       D-DIMER, VTE EXCLUSION - Normal    D-Dimer, Quantitative VTE Exclusion 391      Narrative:     The VTE Exclusion D-Dimer assay is reported in ng/mL Fibrinogen Equivalent Units (FEU).    Per 's instructions for use, a value of less than 500 ng/mL (FEU) may help to exclude DVT or PE in outpatients when the assay is used with a clinical pretest probability assessment.([facility] must utilize and document eCalc 'Wells Score Deep Vein Thrombosis Risk' for DVT exclusion only. Emergency Department should utilize  Guidelines for Emergency Department Use of the VTE Exclusion D-Dimer and Clinical Pretest probability assessment model for DVT or PE exclusion.)   POCT GLUCOSE - Normal    POCT Glucose 82     TROPONIN SERIES- (INITIAL, 1 HR)    Narrative:     The following orders were created for panel order Troponin I Series, High Sensitivity (0, 1 HR).  Procedure                               Abnormality         Status                     ---------                                -----------         ------                     Troponin I, High Sensiti...[[id number]]  Normal              Final result               Troponin, High Sensitivi...[[id number]]  Normal              Final result                 Please view results for these tests on the individual orders.   BLOOD GAS VENOUS FULL PANEL   ELECTROLYTE PANEL, URINE   OSMOLALITY, URINE   OSMOLALITY   POCT GLUCOSE METER        Imaging:   Point of Care Ultrasound   Final Result      XR chest 2 views   Final Result   1.  No evidence of acute cardiopulmonary process.                  MACRO:   None        Signed by: Abad Jenkins 3/14/2025 4:08 PM   Dictation workstation:   [id number]      Transthoracic Echo (TTE) Complete    (Results Pending)        EKG:  15: 07: Rate of 106 bpm, regular rhythm, normal axis, normal intervals, no evidence of ST segment elevations or depressions, no pattern T wave abnormalities.  Low voltage EKG compared to prior obtained March 3, 2024.    19: 48: Rate of 92 bpm, regular rhythm, normal axis, normal intervals, no evidence of ST segment elevations or depressions, no pattern T wave abnormalities.    MDM:  This is a 64-year-old female who presents to the emergency department with shortness of breath with associated cough and chest pain.  Upon arrival she is hemodynamically stable.  Mildly tachycardic to 105 with a blood pressure 100/60, other vitals are normal.  She is afebrile.  She is satting well on room air.  Differential considered includes PE vs viral infection/pneumonia vs decompensated HFrEF, vs MI. A CXR was done on arrival which showed no acute cardiopulmonary processes, and the patient was afebrile, decreasing the likelihood of pneumonia. A POCUS was done given her history of HFrEF and SOB and it showed LVEF that was decreased which is consistent with her PMH, and it also showed no evidence for pericardial effusion. BNP and Troponin was ordered and those were WNL decreasing the likelihood of  decompensated HFrEF. Viral testing was ordered for RSV, COVID, Flu A and B to rule out viral infections in the setting of cough and slight tachycardia at 105, though this is less likely with her being afebrile and no evidence of leukocytosis on CBC. A D-dimer was ordered to help rule out a PE in the setting of chest pain and SOB, though there is no evidence of peripheral edema. EKG showed no evidence of a STEMI making an MI less likely.  Patient's initial VBG showed a metabolic acidosis with a pH of 7.23, normal CO2, bicarb low to 17.  Lactate was normal.  Labs were then obtained which showed no leukocytosis or anemia.  On metabolic panel there is a mild hyperkalemia which is treated medically as well as a low bicarb that is confirmed and creatinine that is elevated compared to the patient's baseline.  Liver function appears normal.  Troponins were normal x 2 and BNP is normal as well.  No evidence of fluid overload on chest x-ray.  Viral swabs were negative.  Given no other cause identified for her shortness of breath, D-dimer was added on which did return normal as well making PE less likely.  Given her unexplained SCARLET with metabolic acidosis of unknown etiology, I do feel she is appropriate for admission.  She has had normal tropes with no evidence of acute ischemia though her EKG does appear different than prior and she may benefit from cardiac restratification and testing as well.  Admissions coordinator contacted and the patient accepted to medicine for further evaluation and management.  She remained stable and awaits transfer to regular nursing floor.      ED Course:  ED Course as of 03/15/25 0101   Fri Mar 14, 2025   2127 Patient reported approximately 2 weeks of cough, shortness of breath, chest discomfort.  Symptoms worse when ambulating.  No measured fever, abdominal pain, back pain, nausea or vomiting.  Reports poor appetite and decreased oral intake.  No leg pain or swelling.  When asked if she has  needed extra pillows at night to prop herself up or whether she has been sleeping in the recliner, patient states that she has been sleeping flat on the bed on her stomach when she is typical for her.  Initial vital signs notable for heart rate 105 otherwise within normal limits.  Multiple attempts by RN for vascular access unsuccessful.  I placed right forearm ultrasound-guided IV.  EKG low voltage QRS not present on previous.  Point-of-care ultrasound cardiac point-of-care ultrasound demonstrated no pericardial effusion.  Decreased LVEF.  I reviewed most recent transthoracic echocardiogram from 4/30/2024 that demonstrated moderately decreased LV systolic function 30 to 35% poorly visualized anatomic structures due to suboptimal image quality. [MC]   2127 ECG 12 lead  Interpreted by me.  3/14/2025 at 1948.  Sinus rhythm 92 bpm.  , QRS 86, QTc 425.  Low voltage QRS.  No ST elevation. [MC]   2315 Blood Gas Venous Full Panel  Venous full panel did not result in computer.  Notable for pH 7.23, pCO2 41, lactate 1.0. [MC]      ED Course User Index  [MC] Mariano Navarrete MD         Diagnoses as of 03/15/25 0101   Acute kidney injury superimposed on CKD (CMS-HCC)   Normal anion gap metabolic acidosis   Dyspnea on exertion       Independent Result Review and Interpretation: Relevant laboratory and radiographic results were reviewed and independently interpreted by myself.  As necessary, they are commented on in the ED Course.    Social Determinants Limiting Care:  None identified      Patient seen by and discussed with the attending emergency medicine physician.       Disposition    Admit    Brain Miller DO   Emergency Medicine PGY-3  [facility]      Procedures      Procedures ? SmartLinks last updated 3/15/2025 1:01 AM        Brain Miller DO  Resident  03/15/25 0110

## 2025-03-15 NOTE — PROGRESS NOTES
Pt reporting viral prodrome symtpoms in context of her HF and medication management. Appears dry, bp low. Checking orthostatics, hold antihypertensives/GDMT and diuretics. Echo with improved EF to 50%, ivc collapsible. Plan to assess for fluids before initiating further GDMT as tolerated.

## 2025-03-15 NOTE — H&P
"History Of Present Illness  Leeann Shankar \"Chery\" is a 64 y.o. female with a PMH of HTN, T2DM (last HgA1c 7.8% 3/8/24), HLD, OA, VERN, vit D deficiency, and chronic Hep C. Pt presented to the ED for further evaluation of SOB and dizziness as well as bilateral thigh pain. She also endorses intermittent nausea and vomiting and states that she has had a hard time keeping down her food over the last few days. Pt observed ambulating in the ED and she is visibly winded and SOB with ambulation. Pt reports that SOB is present at rest and worse with activity. Symptoms have been present for the last 3 weeks per pt. Pox % on RA since arrival to ED. She has had to stop attending cardiac rehab due to her symptoms. She describes bilateral thigh pain as a burning pain that occurs with  activity. Pt did not take any meds to help manage symptoms. Pt told writer that she thinks that her GDMT is cause of her symptoms and are contributing to changes in her renal function. Pt reports that she did not have these symptoms until she was started on medications for heart failure. Labs obtained as part of ED workup reviewed. Labs notable for hyperkalemia, NAGMA, hypermagnesemia, and SCARLET. D-dimer 391 on admit. CXR negative on admit. K cocktail administered while in the ED. Pt admitted to medicine for treatment of SCARLET on CKD and NAGMA.    ED interventions: calcium gluconate 2g x1 dose, Humulin R 5 units x1, Sodium bicarb 50 mEq IV push x1, d50 25g x1 dose     Past Medical History  Past Medical History:   Diagnosis Date    Diabetes mellitus (Multi)     Hyperlipidemia     Hypertension        Surgical History  History reviewed. No pertinent surgical history.     Social History  She reports that she has quit smoking. Her smoking use included cigarettes. She has never used smokeless tobacco. She reports that she does not currently use alcohol. She reports that she does not currently use drugs.    Family History  No family history on file.   "   Allergies  Lisinopril    Review of Systems   Constitutional:  Negative for chills and fever.   HENT:  Negative for congestion.    Respiratory:  Positive for shortness of breath. Negative for cough.    Cardiovascular:  Negative for chest pain and leg swelling.   Gastrointestinal:  Positive for nausea and vomiting. Negative for abdominal pain.   Genitourinary:  Negative for difficulty urinating.   Musculoskeletal:         Intermittent burning in bilateral thigh with activity   Neurological:  Positive for dizziness. Negative for weakness and headaches.        Physical Exam  Constitutional:       General: She is not in acute distress.     Appearance: She is obese.   HENT:      Head: Normocephalic.      Nose: Nose normal.      Mouth/Throat:      Mouth: Mucous membranes are dry.   Cardiovascular:      Rate and Rhythm: Normal rate.      Pulses: Normal pulses.      Heart sounds: Normal heart sounds.   Pulmonary:      Effort: Pulmonary effort is normal. No respiratory distress.   Abdominal:      General: Bowel sounds are normal.      Palpations: Abdomen is soft.   Musculoskeletal:         General: Normal range of motion.   Skin:     General: Skin is warm and dry.   Neurological:      Mental Status: She is alert and oriented to person, place, and time.          Last Recorded Vitals  Blood pressure 131/74, pulse 90, temperature 36.8 °C (98.2 °F), temperature source Temporal, resp. rate 20, SpO2 99%.    Relevant Results    XR chest 2 views    Result Date: 3/14/2025  Interpreted By:  Abad Jenkins, STUDY: XR CHEST 2 VIEWS;  3/14/2025 4:03 pm   INDICATION: Signs/Symptoms:sob.     COMPARISON: 03/03/2024   ACCESSION NUMBER(S): [id number]   ORDERING CLINICIAN: DAMARIS KIM   FINDINGS: PA and lateral radiographs of the chest were provided.       CARDIOMEDIASTINAL SILHOUETTE: Cardiomediastinal silhouette is normal in size and configuration. Aortic atherosclerosis.   LUNGS: Lungs are clear.   ABDOMEN: No remarkable upper  abdominal findings.   BONES: No acute osseous changes. Multilevel degenerative changes of the spine.       1.  No evidence of acute cardiopulmonary process.       MACRO: None   Signed by: Abad Jenkins 3/14/2025 4:08 PM Dictation workstation:   [id number]    Results for orders placed or performed during the hospital encounter of 03/14/25 (from the past 24 hours)   ECG 12 lead   Result Value Ref Range    Ventricular Rate 106 BPM    Atrial Rate 106 BPM    MO Interval 172 ms    QRS Duration 66 ms    QT Interval 326 ms    QTC Calculation(Bazett) 433 ms    P Axis 67 degrees    R Axis 41 degrees    T Axis -3 degrees    QRS Count 17 beats    Q Onset 227 ms    P Onset 141 ms    P Offset 187 ms    T Offset 390 ms    QTC Fredericia 393 ms   ECG 12 lead   Result Value Ref Range    Ventricular Rate 92 BPM    Atrial Rate 92 BPM    MO Interval 142 ms    QRS Duration 86 ms    QT Interval 344 ms    QTC Calculation(Bazett) 425 ms    P Axis 51 degrees    R Axis 15 degrees    T Axis 73 degrees    QRS Count 15 beats    Q Onset 217 ms    P Onset 146 ms    P Offset 181 ms    T Offset 389 ms    QTC Fredericia 396 ms   CBC and Auto Differential   Result Value Ref Range    WBC 6.8 4.4 - 11.3 x10*3/uL    nRBC 0.0 0.0 - 0.0 /100 WBCs    RBC 4.80 4.00 - 5.20 x10*6/uL    Hemoglobin 12.1 12.0 - 16.0 g/dL    Hematocrit 37.4 36.0 - 46.0 %    MCV 78 (L) 80 - 100 fL    MCH 25.2 (L) 26.0 - 34.0 pg    MCHC 32.4 32.0 - 36.0 g/dL    RDW 15.1 (H) 11.5 - 14.5 %    Platelets 372 150 - 450 x10*3/uL    Neutrophils % 40.1 40.0 - 80.0 %    Immature Granulocytes %, Automated 0.3 0.0 - 0.9 %    Lymphocytes % 46.5 13.0 - 44.0 %    Monocytes % 9.3 2.0 - 10.0 %    Eosinophils % 3.1 0.0 - 6.0 %    Basophils % 0.7 0.0 - 2.0 %    Neutrophils Absolute 2.73 1.20 - 7.70 x10*3/uL    Immature Granulocytes Absolute, Automated 0.02 0.00 - 0.70 x10*3/uL    Lymphocytes Absolute 3.17 1.20 - 4.80 x10*3/uL    Monocytes Absolute 0.63 0.10 - 1.00 x10*3/uL    Eosinophils  Absolute 0.21 0.00 - 0.70 x10*3/uL    Basophils Absolute 0.05 0.00 - 0.10 x10*3/uL   Comprehensive metabolic panel   Result Value Ref Range    Glucose 100 (H) 74 - 99 mg/dL    Sodium 135 (L) 136 - 145 mmol/L    Potassium 5.5 (H) 3.5 - 5.3 mmol/L    Chloride 109 (H) 98 - 107 mmol/L    Bicarbonate 16 (L) 21 - 32 mmol/L    Anion Gap 16 10 - 20 mmol/L    Urea Nitrogen 50 (H) 6 - 23 mg/dL    Creatinine 2.05 (H) 0.50 - 1.05 mg/dL    eGFR 27 (L) >60 mL/min/1.73m*2    Calcium 10.1 8.6 - 10.6 mg/dL    Albumin 4.8 3.4 - 5.0 g/dL    Alkaline Phosphatase 101 33 - 136 U/L    Total Protein 8.1 6.4 - 8.2 g/dL    AST 16 9 - 39 U/L    Bilirubin, Total 0.3 0.0 - 1.2 mg/dL    ALT 18 7 - 45 U/L   Magnesium   Result Value Ref Range    Magnesium 2.60 (H) 1.60 - 2.40 mg/dL   B-Type Natriuretic Peptide   Result Value Ref Range    BNP 2 0 - 99 pg/mL   Troponin I, High Sensitivity, Initial   Result Value Ref Range    Troponin I, High Sensitivity (CMC) 6 0 - 34 ng/L   Troponin, High Sensitivity, 1 Hour   Result Value Ref Range    Troponin I, High Sensitivity (CMC) 6 0 - 34 ng/L   D-dimer, VTE Exclusion   Result Value Ref Range    D-Dimer, Quantitative VTE Exclusion 391 <=500 ng/mL FEU   Influenza A, and B PCR   Result Value Ref Range    Flu A Result Not Detected Not Detected    Flu B Result Not Detected Not Detected   Sars-CoV-2 PCR   Result Value Ref Range    Coronavirus 2019, PCR Not Detected Not Detected   RSV PCR   Result Value Ref Range    RSV PCR Not Detected Not Detected   POCT GLUCOSE   Result Value Ref Range    POCT Glucose 82 74 - 99 mg/dL      Lab Results   Component Value Date    HGBA1C 7.8 (H) 03/08/2025      Scheduled medications  atorvastatin, 80 mg, oral, Nightly  heparin (porcine), 7,500 Units, subcutaneous, q8h AARON  insulin lispro, 0-10 Units, subcutaneous, TID AC  metoprolol succinate XL, 100 mg, oral, Daily  mirtazapine, 15 mg, oral, Nightly  polyethylene glycol, 17 g, oral, Daily  sodium bicarbonate, 650 mg, oral,  BID  sodium chloride, 500 mL, intravenous, Once  topiramate, 50 mg, oral, BID      Continuous medications     PRN medications  PRN medications: acetaminophen **OR** acetaminophen, dextrose, dextrose, glucagon, glucagon, melatonin       Assessment/Plan     Pt is a 64 year old female with a PMH of HTN, T2DM (last HgA1c 7.8% 3/8/24), HLD, OA, VERN, vit D deficiency, and chronic Hep C. Pt presented to the ED for further evaluation of SOB and dizziness as well as bilateral thigh pain.Labs notable for hyperkalemia, NAGMA, hypermagnesemia, and SCARLET. D-dimer 391 on admit. CXR negative on admit. K cocktail administered while in the ED. Pt admitted to medicine for treatment of SCARLET on CKD and NAGMA.    SCARLET on CKD  NAGMA  - Cr 2.05 GFR 27 on admit--> Cr 1.48 GFR 40 on 1/21/25   - Etiology:  Medication Induced vs dehydration  - 500 ml NS bolus x1  - Goal urine output >0.5cc/kg/hr.   - Avoid nephrotoxic medication administration   - Labs ordered: Urine electrolytes, Serum osmolality, Urine osmolality  - Nephrology consult and renal US if renal function does not improve within next 24 hrs  - Monitor kidney function with repeat labs as well as urine output   - RFP ordered for AM  - holding home dose of Entresto, Farxiga, and Aldactone d/t SCARLET  - start sodium bicarb 650 mg PO BID  - s/p sodium bicarb 50 mEq IV push x1 dose while in ED    Hyperkalemia  - K 5.5 on admit  - s/p K cocktail while in the ED  - RFP ordered for AM    Dizziness  - orthostatic BP/P once in AM    HFrEF (last EF 30-35% 4/30/24)  HTN  SOB  - BNP on admit: 2  - last /74  - continue to monitor BP  - DW, I&O  - Echo ordered for AM to r/o cardiac cause of dyspnea and assessment of HF since pt is newly diagnosed with HF in the last year  - consult cardiology in AM for adjustment of GDMT; pt would like to see Dr. Ortiz if possible during admit  - last seen by Dr. Ortiz (cardiology) on 1/23/25. Per documentation from visit: Pt has been attending cardiac  rehab. Cardiac MRI with regadenoson challenge prior to next visit ( insurance clearance pending).Plan for repeat TTE and CPET once heart failure medication is at goal. Once medications are optimised will consider need for device therapy. Pt has appt scheduled with Cardiology at [facility] on 4/15 and with Dr. Ortiz on 5/13.   - c/w Toprol  mg PO every day  - holding home dose of Aldactone, Entresto, and Farxiga d/t SCARLET    T2DM (last HgA1c 7.8% 3/8/24)  - accucheck ACHS  - Lispro SSI 0-10 units ordered  - hypoglycemia order set placed    HLD  - c/w home dose of Lipitor 80 mg PO at bedtime    Dispo: admit to RNF. Plan per above. Estimated LOS: 3 days    I spent 75 minutes in the professional and overall care of this patient.      Sonia Rondon, APRN-CNP

## 2025-03-15 NOTE — ED PROCEDURE NOTE
Procedure    Performed by: Mariano Navarrete MD  Authorized by: Mariano Navarrete MD    Cardiac Indications: orthopnea dyspnea                  Procedure: Cardiac Ultrasound    Findings:   Views: parasternal long and parasternal short  The pericardial space was visualized and was NEGATIVE for a significant pericardial effusion.    LV: LV systolic function was DECREASED.      Impression:  Cardiac: The focused cardiac ultrasound exam had ABNORMAL findings as specified.                   Mariano Navarrete MD  03/14/25 [id number]

## 2025-03-15 NOTE — ED PROCEDURE NOTE
Procedure  Ultrasound Guided IV    Performed by: Mariano Navarrete MD  Authorized by: Mariano Navarrete MD    Consent:     Consent obtained:  Verbal    Consent given by:  Patient    There was difficulty obtaining IV access on this patient, and multiple attempts by nursing staff and/or medics have failed. Patient required IV access by ED provider under the assistance of ultrasound.      Site was prepped and sterilized before IV insertion.     Ultrasound images were saved in the patient's chart demonstrating cannulization.     Compressible forearm vein.    IV Size:20  IV Side: Right  IV Site: Forearm    MD Mariano Cardona MD  03/14/25 2116       Mariano Navarrete MD  03/14/25 2116

## 2025-03-16 LAB
ALBUMIN SERPL BCP-MCNC: 3.8 G/DL (ref 3.4–5)
ALBUMIN SERPL-MCNC: 4.7 G/DL (ref 3.6–5.1)
ANION GAP SERPL CALC-SCNC: 15 MMOL/L (ref 10–20)
BNP SERPL-MCNC: 6 PG/ML
BUN SERPL-MCNC: 40 MG/DL (ref 6–23)
BUN SERPL-MCNC: 48 MG/DL (ref 7–25)
BUN/CREAT SERPL: 22 (CALC) (ref 6–22)
CALCIUM SERPL-MCNC: 9.2 MG/DL (ref 8.6–10.6)
CALCIUM SERPL-MCNC: 9.6 MG/DL (ref 8.6–10.4)
CHLORIDE SERPL-SCNC: 111 MMOL/L (ref 98–107)
CHLORIDE SERPL-SCNC: 111 MMOL/L (ref 98–110)
CO2 SERPL-SCNC: 14 MMOL/L (ref 21–32)
CO2 SERPL-SCNC: 15 MMOL/L (ref 20–32)
CREAT SERPL-MCNC: 1.73 MG/DL (ref 0.5–1.05)
CREAT SERPL-MCNC: 2.15 MG/DL (ref 0.5–1.05)
CREAT UR-MCNC: 94.3 MG/DL (ref 20–320)
EGFRCR SERPLBLD CKD-EPI 2021: 25 ML/MIN/1.73M2
EGFRCR SERPLBLD CKD-EPI 2021: 33 ML/MIN/1.73M*2
ERYTHROCYTE [DISTWIDTH] IN BLOOD BY AUTOMATED COUNT: 14.9 % (ref 11–15)
ERYTHROCYTE [DISTWIDTH] IN BLOOD BY AUTOMATED COUNT: 15.2 % (ref 11.5–14.5)
FERRITIN SERPL-MCNC: 197 NG/ML (ref 16–288)
GLUCOSE BLD MANUAL STRIP-MCNC: 112 MG/DL (ref 74–99)
GLUCOSE BLD MANUAL STRIP-MCNC: 130 MG/DL (ref 74–99)
GLUCOSE BLD MANUAL STRIP-MCNC: 77 MG/DL (ref 74–99)
GLUCOSE BLD MANUAL STRIP-MCNC: 88 MG/DL (ref 74–99)
GLUCOSE SERPL-MCNC: 120 MG/DL (ref 65–99)
GLUCOSE SERPL-MCNC: 73 MG/DL (ref 74–99)
HCT VFR BLD AUTO: 34.6 % (ref 36–46)
HCT VFR BLD AUTO: 39.2 % (ref 35–45)
HGB BLD-MCNC: 10.6 G/DL (ref 12–16)
HGB BLD-MCNC: 11.9 G/DL (ref 11.7–15.5)
IRON SATN MFR SERPL: 37 % (CALC) (ref 16–45)
IRON SERPL-MCNC: 117 MCG/DL (ref 45–160)
MAGNESIUM SERPL-MCNC: 2.08 MG/DL (ref 1.6–2.4)
MCH RBC QN AUTO: 25.1 PG (ref 27–33)
MCH RBC QN AUTO: 25.4 PG (ref 26–34)
MCHC RBC AUTO-ENTMCNC: 30.4 G/DL (ref 32–36)
MCHC RBC AUTO-ENTMCNC: 30.6 G/DL (ref 32–36)
MCV RBC AUTO: 82.7 FL (ref 80–100)
MCV RBC AUTO: 83 FL (ref 80–100)
NRBC BLD-RTO: 0 /100 WBCS (ref 0–0)
PHOSPHATE SERPL-MCNC: 4.6 MG/DL (ref 2.5–4.9)
PHOSPHATE SERPL-MCNC: 5 MG/DL (ref 2.5–4.5)
PLATELET # BLD AUTO: 274 X10*3/UL (ref 150–450)
PLATELET # BLD AUTO: 376 THOUSAND/UL (ref 140–400)
PMV BLD REES-ECKER: 10.3 FL (ref 7.5–12.5)
POTASSIUM SERPL-SCNC: 5.3 MMOL/L (ref 3.5–5.3)
POTASSIUM SERPL-SCNC: 6 MMOL/L (ref 3.5–5.3)
RBC # BLD AUTO: 4.18 X10*6/UL (ref 4–5.2)
RBC # BLD AUTO: 4.74 MILLION/UL (ref 3.8–5.1)
SODIUM SERPL-SCNC: 135 MMOL/L (ref 136–145)
SODIUM SERPL-SCNC: 136 MMOL/L (ref 135–146)
TIBC SERPL-MCNC: 315 MCG/DL (CALC) (ref 250–450)
UREA/CREAT UR-SRTO: 4.9 G/G CREAT
UUN UR-MCNC: 466 MG/DL
WBC # BLD AUTO: 6 X10*3/UL (ref 4.4–11.3)
WBC # BLD AUTO: 7.1 THOUSAND/UL (ref 3.8–10.8)

## 2025-03-16 PROCEDURE — 85027 COMPLETE CBC AUTOMATED: CPT | Performed by: NURSE PRACTITIONER

## 2025-03-16 PROCEDURE — 2500000004 HC RX 250 GENERAL PHARMACY W/ HCPCS (ALT 636 FOR OP/ED): Performed by: NURSE PRACTITIONER

## 2025-03-16 PROCEDURE — 1200000002 HC GENERAL ROOM WITH TELEMETRY DAILY

## 2025-03-16 PROCEDURE — 2500000001 HC RX 250 WO HCPCS SELF ADMINISTERED DRUGS (ALT 637 FOR MEDICARE OP): Performed by: NURSE PRACTITIONER

## 2025-03-16 PROCEDURE — 82947 ASSAY GLUCOSE BLOOD QUANT: CPT

## 2025-03-16 PROCEDURE — 2500000004 HC RX 250 GENERAL PHARMACY W/ HCPCS (ALT 636 FOR OP/ED): Performed by: STUDENT IN AN ORGANIZED HEALTH CARE EDUCATION/TRAINING PROGRAM

## 2025-03-16 PROCEDURE — 99232 SBSQ HOSP IP/OBS MODERATE 35: CPT | Performed by: STUDENT IN AN ORGANIZED HEALTH CARE EDUCATION/TRAINING PROGRAM

## 2025-03-16 PROCEDURE — 80069 RENAL FUNCTION PANEL: CPT | Performed by: NURSE PRACTITIONER

## 2025-03-16 PROCEDURE — 36415 COLL VENOUS BLD VENIPUNCTURE: CPT | Performed by: NURSE PRACTITIONER

## 2025-03-16 PROCEDURE — 83735 ASSAY OF MAGNESIUM: CPT | Performed by: NURSE PRACTITIONER

## 2025-03-16 RX ORDER — SODIUM BICARBONATE 650 MG/1
650 TABLET ORAL ONCE
Status: COMPLETED | OUTPATIENT
Start: 2025-03-16 | End: 2025-03-16

## 2025-03-16 RX ORDER — SODIUM BICARBONATE 650 MG/1
1300 TABLET ORAL 2 TIMES DAILY
Status: DISCONTINUED | OUTPATIENT
Start: 2025-03-16 | End: 2025-03-19 | Stop reason: HOSPADM

## 2025-03-16 RX ADMIN — SODIUM BICARBONATE 1300 MG: 650 TABLET ORAL at 21:45

## 2025-03-16 RX ADMIN — ACETAMINOPHEN 650 MG: 325 TABLET ORAL at 00:05

## 2025-03-16 RX ADMIN — SODIUM BICARBONATE 650 MG: 650 TABLET ORAL at 09:17

## 2025-03-16 RX ADMIN — MIRTAZAPINE 15 MG: 15 TABLET, FILM COATED ORAL at 00:00

## 2025-03-16 RX ADMIN — SODIUM CHLORIDE 1000 ML: 9 INJECTION, SOLUTION INTRAVENOUS at 12:30

## 2025-03-16 RX ADMIN — ATORVASTATIN CALCIUM 80 MG: 80 TABLET, FILM COATED ORAL at 21:45

## 2025-03-16 RX ADMIN — MIRTAZAPINE 15 MG: 15 TABLET, FILM COATED ORAL at 21:45

## 2025-03-16 RX ADMIN — TOPIRAMATE 50 MG: 50 TABLET, FILM COATED ORAL at 21:45

## 2025-03-16 RX ADMIN — SODIUM BICARBONATE 650 MG: 650 TABLET ORAL at 12:04

## 2025-03-16 RX ADMIN — ATORVASTATIN CALCIUM 80 MG: 80 TABLET, FILM COATED ORAL at 00:05

## 2025-03-16 RX ADMIN — TOPIRAMATE 50 MG: 50 TABLET, FILM COATED ORAL at 09:18

## 2025-03-16 RX ADMIN — SODIUM BICARBONATE 650 MG: 650 TABLET ORAL at 00:05

## 2025-03-16 RX ADMIN — METOPROLOL SUCCINATE 100 MG: 50 TABLET, EXTENDED RELEASE ORAL at 09:17

## 2025-03-16 ASSESSMENT — PAIN SCALES - WONG BAKER

## 2025-03-16 ASSESSMENT — COGNITIVE AND FUNCTIONAL STATUS - GENERAL
DAILY ACTIVITIY SCORE: 24
CLIMB 3 TO 5 STEPS WITH RAILING: A LITTLE
MOBILITY SCORE: 23
DAILY ACTIVITIY SCORE: 24
CLIMB 3 TO 5 STEPS WITH RAILING: A LITTLE
MOBILITY SCORE: 23

## 2025-03-16 ASSESSMENT — PAIN SCALES - GENERAL

## 2025-03-16 ASSESSMENT — PAIN SCALES - PAIN ASSESSMENT IN ADVANCED DEMENTIA (PAINAD)
FACIALEXPRESSION: SMILING OR INEXPRESSIVE
BODYLANGUAGE: RELAXED
BREATHING: NORMAL
FACIALEXPRESSION: SMILING OR INEXPRESSIVE
CONSOLABILITY: NO NEED TO CONSOLE
TOTALSCORE: 0
BREATHING: NORMAL
BODYLANGUAGE: RELAXED
TOTALSCORE: 0
CONSOLABILITY: NO NEED TO CONSOLE

## 2025-03-16 ASSESSMENT — ACTIVITIES OF DAILY LIVING (ADL): LACK_OF_TRANSPORTATION: NO

## 2025-03-16 ASSESSMENT — PAIN INTENSITY VAS
VAS_PAIN_BASICVITALS_IP: 0
VAS_PAIN_BASICVITALS_IP: 0

## 2025-03-16 NOTE — HOSPITAL COURSE
Leeann Shankar is a 64 y.o. female with a PMH of HTN, T2DM (last HgA1c 7.8% 3/8/24), HLD, OA, VERN, vit D deficiency, and chronic Hep C who presented with sob, dizziness and thigh pain. Pt endorsing URI sx, n/v and vomitting have that now been improving but she developed sob and dizziness. She is also have b/l thigh pain when she exerts herself/with ambulation. Sx now present for 3 weeks. She feels these are medication side effects. In the Ed she was found to have low bp. Labs showed hyperkalemia, NAGMA, hypermagnesemia, and SCARLET. D-dimer 391 on admit. CXR negative on admit. K cocktail administered. Cr  peaked at 2.05, associated with metabolic acidosis. Pt given fluids. Echo showing recovered EF to 50%, normal and collapsible IVC. Cr downtrending, holding antihypertensives, bp improved. She is awaiting RUFINA of lower extremity

## 2025-03-16 NOTE — PROGRESS NOTES
03/16/25 1359   Discharge Planning   Living Arrangements Family members   Support Systems Family members   Assistance Needed patient uses a rollator for ambulation assistance   Type of Residence Private residence   Home or Post Acute Services In home services   Type of Home Care Services Home health aide   Expected Discharge Disposition Home Health   Does the patient need discharge transport arranged? Yes   RoundTrip coordination needed? Yes   Financial Resource Strain   How hard is it for you to pay for the very basics like food, housing, medical care, and heating? Not hard   Housing Stability   In the last 12 months, was there a time when you were not able to pay the mortgage or rent on time? N   In the past 12 months, how many times have you moved where you were living? 0   At any time in the past 12 months, were you homeless or living in a shelter (including now)? N   Transportation Needs   In the past 12 months, has lack of transportation kept you from medical appointments or from getting medications? no   In the past 12 months, has lack of transportation kept you from meetings, work, or from getting things needed for daily living? No       Plan per Medical/Surgical team: Patient is admitted for shortness of breath, dizziness, and thigh pain. Echocardiogram ordered. Per notes, awaiting an RUFINA of the lower extremities.    Assessment Note: Patient lives with family. She uses a rollator for ambulation assistance. Her granddaughter is her home health aide. Patient stated she receives C through [facility] in the past. Denies any financial or social work needs. Will possibly need transportation assistance at discharge.    Home Care: [facility] x 7 days  for 4 hours  PCP: Dr. Aym Degroot  Pharmacy: [facility]  DME: rollator  Falls: denies  Dialysis: denies    Potential Barriers: none  Discharge Disposition: [facility]  ADOD: 1-3 days    Dalila Velazco RN, BSN  Transitional Care Coordinator

## 2025-03-16 NOTE — NURSING NOTE
Patient admitted to [place] [address] from the [facility]. She was oriented to the environment and call light system. She was placed on the telemetry monitor per MD order. Her admission screenings were completed. She lives at home and her grandson stays with her. She stated her daughter is her home health aid (from the [employer]) daily from [phone]. She has tub bars and a rollator. She anticipates returning home at discharge time. She would also like some information from the SW about advance directives. SW consult generated from the admission screening questions.

## 2025-03-16 NOTE — PROGRESS NOTES
Assessment/Plan   Leeann Shankar is a 64 y.o. female with a PMH of HTN, T2DM (last HgA1c 7.8% 3/8/24), HLD, OA, VERN, vit D deficiency, and chronic Hep C who presented with sob, dizziness and thigh pain. Pt endorsing URI sx, n/v and vomitting have that now been improving but she developed sob and dizziness. She is also have b/l thigh pain when she exerts herself/with ambulation. Sx now present for 3 weeks. She feels these are medication side effects. In the Ed she was found to have low bp. Labs showed hyperkalemia, NAGMA, hypermagnesemia, and SCARLET. D-dimer 391 on admit. CXR negative on admit. K cocktail administered. Cr  peaked at 2.05, associated with metabolic acidosis. Pt given fluids. Echo showing recovered EF to 50%, normal and collapsible IVC. Cr downtrending, holding antihypertensives, bp improved. She is awaiting RUFINA of lower extremity .      SCARLET on CKD  NAGMA  - Cr 2.05 GFR 27 on admit--> Cr 1.48 GFR 40 on 1/21/25  - currently ~1.7.  Etiology:  Medication Induced vs dehydration, likely related to fluid balance in setting of reported viral prodrome  - s/p fluid  - checking orthostatics again today. IVC was collapsible, EF was recovered.   - I/o, daily weight  - hold home Entresto, Farxiga, and Aldactone d/t SCARLET  - Avoid nephrotoxic medication administration   - start sodium bicarb 650 mg PO BID, increased to 1300mg BID. Acidosis could also be related to recent diarrhea and gi loss.      Hyperkalemia  - K 5.5 on admit  - s/p K cocktail while in the ED  - RFP ordered for AM, improved     Dizziness  - orthostatic BP/P repeat today  - bp improved  - likely needs reduction in antihypertensives or diuretic on dc     HFrEF (last EF 30-35% 4/30/24)  HTN  SOB  - BNP on admit: 2  - continue to monitor BP  - DW, I&O  - appears dry on admission  - echo now with recoevered EF to 50%.   - s/p fluids. Awaiting repeat orthostatics.   - last seen by Dr. Ortiz (cardiology) on 1/23/25. Per documentation from visit: Pt has been  attending cardiac rehab. Cardiac MRI with regadenoson challenge prior to next visit ( insurance clearance pending).Plan for repeat TTE and CPET once heart failure medication is at goal. Once medications are optimised will consider need for device therapy. Pt has appt scheduled with Cardiology at [facility] on 4/15 and with Dr. Ortiz on 5/13.   - c/w Toprol  mg PO every day  - holding home dose of Aldactone, Entresto, and Farxiga d/t SCARLET     T2DM (last HgA1c 7.8% 3/8/24)  - accucheck ACHS  - Lispro SSI 0-10 units ordered  - hypoglycemia order set placed     HLD  - c/w home dose of Lipitor 80 mg PO at bedtime          Scheduled outpatient appointments in system:   Future Appointments   Date Time Provider Department Center   3/18/2025  2:00 PM CARDIAC REHAB [facility]1800 CARDREHB ROOM [address]   3/20/2025  2:00 PM CARDIAC REHAB [place] [id number] CARDREHB ROOM [address]   3/25/2025  2:00 PM CARDIAC REHAB [facility] [id number] CARDREHB ROOM [address]   3/27/2025  2:00 PM CARDIAC REHAB [facility] [id number] CARDREHB ROOM [address]   4/1/2025  2:00 PM CARDIAC REHAB [facility] [id number] CARDREHB ROOM [address]   4/3/2025  2:00 PM CARDIAC REHAB [facility] [id number] CARDREHB ROOM [address]   4/8/2025  2:00 PM CARDIAC REHAB [facility] [id number] CARDREHB ROOM [address]   4/10/2025  2:00 PM CARDIAC REHAB [facility] [id number] CARDREHB ROOM [address]   4/15/2025  2:00 PM CARDIAC REHAB [facility] [id number] CARDREHB ROOM [address]   4/17/2025  2:00 PM CARDIAC REHAB [facility] [id number] CARDREHB ROOM [address]   4/22/2025  2:00 PM CARDIAC REHAB [facility]1800 CARDREHB ROOM [address]   4/24/2025  2:00 PM CARDIAC REHAB [facility] CARDREHB ROOM [address]   5/1/2025 11:00 AM [place] [address] ECHO [id number] [facility]   5/13/2025 11:00 AM Georgette Ortiz MD PhD [address]  "    ---------------------------------------------------------------------------------------------------  Subjective   No new events. Pt able to lay flat. She reports improved sob. No dizziness but has not been up. She is tolerating diet. Does not have leg pain at rest. Pt asking about dialysis. Pt educated on kidney dysfunction. No further n/v, diarrhea.     ---------------------------------------------------------------------------------------------------  Objective   Last Recorded Vitals  Blood pressure 89/69, pulse 103, temperature 36.1 °C (97 °F), temperature source Temporal, resp. rate 18, height 1.575 m (5' 2\"), weight 112 kg (246 lb 7.6 oz), SpO2 95%.  Intake/Output last 3 Shifts:  I/O last 3 completed shifts:  In: 100 (0.9 mL/kg) [IV Piggyback:100]  Out: - (0 mL/kg)   Weight: 111.8 kg     Physical Exam  Vitals and nursing note reviewed.   Constitutional:       General: She is not in acute distress.     Appearance: Normal appearance. She is obese. She is not ill-appearing.   HENT:      Head: Normocephalic and atraumatic.      Mouth/Throat:      Mouth: Mucous membranes are moist.   Eyes:      General: No scleral icterus.     Extraocular Movements: Extraocular movements intact.      Conjunctiva/sclera: Conjunctivae normal.   Cardiovascular:      Rate and Rhythm: Normal rate and regular rhythm.      Heart sounds: S1 normal and S2 normal. No murmur heard.  Pulmonary:      Effort: Pulmonary effort is normal. No respiratory distress.      Breath sounds: No wheezing, rhonchi or rales.   Abdominal:      General: Bowel sounds are normal. There is no distension.      Palpations: Abdomen is soft.      Tenderness: There is no abdominal tenderness. There is no guarding or rebound.   Musculoskeletal:         General: No swelling or deformity.      Cervical back: Neck supple.   Skin:     General: Skin is warm and dry.      Findings: No rash.      Comments: Skin wrinkling present   Neurological:      General: No focal " deficit present.      Mental Status: She is alert and oriented to person, place, and time. Mental status is at baseline.      Sensory: No sensory deficit.      Motor: No weakness.   Psychiatric:         Mood and Affect: Mood normal.         Behavior: Behavior normal.         Relevant Results  Lab Results   Component Value Date    WBC 6.0 03/16/2025    HGB 10.6 (L) 03/16/2025    HCT 34.6 (L) 03/16/2025    MCV 83 03/16/2025     03/16/2025      Lab Results   Component Value Date    GLUCOSE 73 (L) 03/16/2025    CALCIUM 9.2 03/16/2025     (L) 03/16/2025    K 5.3 03/16/2025    CO2 14 (L) 03/16/2025     (H) 03/16/2025    BUN 40 (H) 03/16/2025    CREATININE 1.73 (H) 03/16/2025     Scheduled medications  atorvastatin, 80 mg, oral, Nightly  heparin (porcine), 7,500 Units, subcutaneous, q8h AARON  insulin lispro, 0-10 Units, subcutaneous, TID AC  metoprolol succinate XL, 100 mg, oral, Daily  mirtazapine, 15 mg, oral, Nightly  polyethylene glycol, 17 g, oral, Daily  sodium bicarbonate, 1,300 mg, oral, BID  sodium bicarbonate, 650 mg, oral, Once  topiramate, 50 mg, oral, BID      Continuous medications     PRN medications  PRN medications: acetaminophen **OR** acetaminophen, dextrose, dextrose, glucagon, glucagon, melatonin    Richard Becker MD

## 2025-03-17 ENCOUNTER — APPOINTMENT (OUTPATIENT)
Dept: VASCULAR MEDICINE | Facility: HOSPITAL | Age: 65
End: 2025-03-17
Payer: MEDICARE

## 2025-03-17 VITALS
DIASTOLIC BLOOD PRESSURE: 53 MMHG | HEART RATE: 95 BPM | WEIGHT: 246.47 LBS | BODY MASS INDEX: 45.36 KG/M2 | OXYGEN SATURATION: 99 % | TEMPERATURE: 97.5 F | SYSTOLIC BLOOD PRESSURE: 111 MMHG | HEIGHT: 62 IN | RESPIRATION RATE: 17 BRPM

## 2025-03-17 LAB
ALBUMIN SERPL BCP-MCNC: 4 G/DL (ref 3.4–5)
ANION GAP BLDV CALCULATED.4IONS-SCNC: 15 MMOL/L (ref 10–25)
ANION GAP SERPL CALC-SCNC: 11 MMOL/L (ref 10–20)
BASE EXCESS BLDV CALC-SCNC: -9.9 MMOL/L (ref -2–3)
BODY TEMPERATURE: 37 DEGREES CELSIUS
BUN SERPL-MCNC: 34 MG/DL (ref 6–23)
CA-I BLDV-SCNC: 1.29 MMOL/L (ref 1.1–1.33)
CALCIUM SERPL-MCNC: 9.4 MG/DL (ref 8.6–10.6)
CHLORIDE BLDV-SCNC: 109 MMOL/L (ref 98–107)
CHLORIDE SERPL-SCNC: 111 MMOL/L (ref 98–107)
CO2 SERPL-SCNC: 20 MMOL/L (ref 21–32)
CREAT SERPL-MCNC: 1.83 MG/DL (ref 0.5–1.05)
EGFRCR SERPLBLD CKD-EPI 2021: 31 ML/MIN/1.73M*2
ERYTHROCYTE [DISTWIDTH] IN BLOOD BY AUTOMATED COUNT: 15.1 % (ref 11.5–14.5)
GLUCOSE BLD MANUAL STRIP-MCNC: 124 MG/DL (ref 74–99)
GLUCOSE BLD MANUAL STRIP-MCNC: 136 MG/DL (ref 74–99)
GLUCOSE BLD MANUAL STRIP-MCNC: 84 MG/DL (ref 74–99)
GLUCOSE BLDV-MCNC: 105 MG/DL (ref 74–99)
GLUCOSE SERPL-MCNC: 82 MG/DL (ref 74–99)
HCO3 BLDV-SCNC: 17.2 MMOL/L (ref 22–26)
HCT VFR BLD AUTO: 35.4 % (ref 36–46)
HCT VFR BLD EST: 38 % (ref 36–46)
HGB BLD-MCNC: 10.8 G/DL (ref 12–16)
HGB BLDV-MCNC: 12.5 G/DL (ref 12–16)
INHALED O2 CONCENTRATION: 21 %
LACTATE BLDV-SCNC: 1 MMOL/L (ref 0.4–2)
MCH RBC QN AUTO: 24.9 PG (ref 26–34)
MCHC RBC AUTO-ENTMCNC: 30.5 G/DL (ref 32–36)
MCV RBC AUTO: 82 FL (ref 80–100)
NRBC BLD-RTO: 0 /100 WBCS (ref 0–0)
OXYHGB MFR BLDV: 56 % (ref 45–75)
PCO2 BLDV: 41 MM HG (ref 41–51)
PH BLDV: 7.23 PH (ref 7.33–7.43)
PHOSPHATE SERPL-MCNC: 4.3 MG/DL (ref 2.5–4.9)
PLATELET # BLD AUTO: 307 X10*3/UL (ref 150–450)
PO2 BLDV: 38 MM HG (ref 35–45)
POTASSIUM BLDV-SCNC: 5.9 MMOL/L (ref 3.5–5.3)
POTASSIUM SERPL-SCNC: 5 MMOL/L (ref 3.5–5.3)
RBC # BLD AUTO: 4.33 X10*6/UL (ref 4–5.2)
SAO2 % BLDV: 57 % (ref 45–75)
SODIUM BLDV-SCNC: 135 MMOL/L (ref 136–145)
SODIUM SERPL-SCNC: 137 MMOL/L (ref 136–145)
WBC # BLD AUTO: 6.4 X10*3/UL (ref 4.4–11.3)

## 2025-03-17 PROCEDURE — 85027 COMPLETE CBC AUTOMATED: CPT | Performed by: STUDENT IN AN ORGANIZED HEALTH CARE EDUCATION/TRAINING PROGRAM

## 2025-03-17 PROCEDURE — 82947 ASSAY GLUCOSE BLOOD QUANT: CPT

## 2025-03-17 PROCEDURE — 80069 RENAL FUNCTION PANEL: CPT | Performed by: STUDENT IN AN ORGANIZED HEALTH CARE EDUCATION/TRAINING PROGRAM

## 2025-03-17 PROCEDURE — 93922 UPR/L XTREMITY ART 2 LEVELS: CPT | Performed by: SURGERY

## 2025-03-17 PROCEDURE — 2500000004 HC RX 250 GENERAL PHARMACY W/ HCPCS (ALT 636 FOR OP/ED): Performed by: STUDENT IN AN ORGANIZED HEALTH CARE EDUCATION/TRAINING PROGRAM

## 2025-03-17 PROCEDURE — 1200000002 HC GENERAL ROOM WITH TELEMETRY DAILY

## 2025-03-17 PROCEDURE — 99232 SBSQ HOSP IP/OBS MODERATE 35: CPT | Performed by: STUDENT IN AN ORGANIZED HEALTH CARE EDUCATION/TRAINING PROGRAM

## 2025-03-17 PROCEDURE — 93922 UPR/L XTREMITY ART 2 LEVELS: CPT

## 2025-03-17 PROCEDURE — 36415 COLL VENOUS BLD VENIPUNCTURE: CPT | Performed by: STUDENT IN AN ORGANIZED HEALTH CARE EDUCATION/TRAINING PROGRAM

## 2025-03-17 PROCEDURE — 2500000001 HC RX 250 WO HCPCS SELF ADMINISTERED DRUGS (ALT 637 FOR MEDICARE OP): Performed by: NURSE PRACTITIONER

## 2025-03-17 RX ADMIN — SODIUM BICARBONATE 1300 MG: 650 TABLET ORAL at 21:05

## 2025-03-17 RX ADMIN — METOPROLOL SUCCINATE 100 MG: 50 TABLET, EXTENDED RELEASE ORAL at 09:24

## 2025-03-17 RX ADMIN — TOPIRAMATE 50 MG: 50 TABLET, FILM COATED ORAL at 09:25

## 2025-03-17 RX ADMIN — ATORVASTATIN CALCIUM 80 MG: 80 TABLET, FILM COATED ORAL at 21:04

## 2025-03-17 RX ADMIN — TOPIRAMATE 50 MG: 50 TABLET, FILM COATED ORAL at 21:04

## 2025-03-17 RX ADMIN — MIRTAZAPINE 15 MG: 15 TABLET, FILM COATED ORAL at 21:04

## 2025-03-17 RX ADMIN — SODIUM CHLORIDE 1000 ML: 9 INJECTION, SOLUTION INTRAVENOUS at 09:59

## 2025-03-17 RX ADMIN — SODIUM BICARBONATE 1300 MG: 650 TABLET ORAL at 09:25

## 2025-03-17 ASSESSMENT — COGNITIVE AND FUNCTIONAL STATUS - GENERAL
WALKING IN HOSPITAL ROOM: A LITTLE
DAILY ACTIVITIY SCORE: 24
CLIMB 3 TO 5 STEPS WITH RAILING: A LITTLE
DAILY ACTIVITIY SCORE: 24
MOBILITY SCORE: 24
MOBILITY SCORE: 22

## 2025-03-17 ASSESSMENT — PAIN SCALES - WONG BAKER: WONGBAKER_NUMERICALRESPONSE: NO HURT

## 2025-03-17 ASSESSMENT — PAIN SCALES - GENERAL
PAINLEVEL_OUTOF10: 0 - NO PAIN
PAINLEVEL_OUTOF10: 0 - NO PAIN

## 2025-03-17 NOTE — PROGRESS NOTES
Assessment/Plan   Leeann Shankar is a 64 y.o. female with a PMH of HTN, T2DM (last HgA1c 7.8% 3/8/24), HLD, OA, VERN, vit D deficiency, and chronic Hep C who presented with sob, dizziness and thigh pain. Pt endorsing URI sx, n/v and vomitting have that now been improving but she developed sob and dizziness. She is also have b/l thigh pain when she exerts herself/with ambulation. Sx now present for 3 weeks. She feels these are medication side effects. In the Ed she was found to have low bp. Labs showed hyperkalemia, NAGMA, hypermagnesemia, and SCARLET. D-dimer 391 on admit. CXR negative on admit. K cocktail administered. Cr  peaked at 2.05, associated with metabolic acidosis. Pt given fluids. Echo showing recovered EF to 50%, normal and collapsible IVC. Cr downtrending, holding antihypertensives, bp improved. She is awaiting RUFINA of lower extremity .       SCARLET on CKD  NAGMA  - Cr 2.05 GFR 27 on admit--> Cr 1.48 GFR 40 on 1/21/25  - currently ~1.7-1.8.  Etiology:  Medication Induced vs dehydration, likely related to fluid balance in setting of reported viral prodrome. Pt remains orthostatic after fluids.   -  IVC was collapsible, EF was recovered to 50%. Holding BP meds except metop. Pt's orthostatics remain positive on 3/17.   - continue fluids, reeval after volume optimized.   - I/o, daily weight  - hold home Entresto, Farxiga, and Aldactone d/t SCARLET  - Avoid nephrotoxic medication administration   - start sodium bicarb 650 mg PO BID, increased to 1300mg BID. Acidosis could also be related to recent diarrhea and gi loss.      Hyperkalemia  - K 5.5 on admit  - s/p K cocktail while in the ED  - RFP ordered for AM, improved. Currently around 5. improved     Dizziness 2/2 orthostatic hypotension   - orthostatic BP/P repeat today  - bp improved  - likely needs reduction in antihypertensives or diuretic on dc  - remains orthostatic, ordered additional fluids and ordered thigh high comprsssion stockings. Repeat orthostatics after  fluids and after stockings applied.      HFrEF (last EF 30-35% 4/30/24)  HTN  - BNP on admit: 2  - continue to monitor BP  - DW, I&O  - appeared  dry on admission  - echo now with recoevered EF to 50%.   - continuing fluids 2/2 orthostatic hypotension, no signs of decompensation from heart failure signs/symptoms.   - last seen by Dr. Ortiz (cardiology) on 1/23/25. Per documentation from visit: Pt has been attending cardiac rehab. Cardiac MRI with regadenoson challenge prior to next visit ( insurance clearance pending).Plan for repeat TTE and CPET once heart failure medication is at goal. Once medications are optimised will consider need for device therapy. Pt has appt scheduled with Cardiology at [facility] on 4/15 and with Dr. Ortiz on 5/13.   - c/w Toprol  mg PO every day  - holding home dose of Aldactone, Entresto, and Farxiga d/t SCARLET and orthostatics.      T2DM (last HgA1c 7.8% 3/8/24)  - accucheck ACHS  - Lispro SSI 0-10 units ordered  - hypoglycemia order set placed     HLD  - c/w home dose of Lipitor 80 mg PO at bedtime          Scheduled outpatient appointments in system:   Future Appointments   Date Time Provider Department Center   3/17/2025  3:00 PM [facility] 2 [id number] [facility]   3/18/2025  2:00 PM [facility] [address]   3/20/2025  2:00 PM [facility] [address]   3/25/2025  2:00 PM [facility] [place] [id number] [facility] [address]   3/27/2025  2:00 PM [facility] [place] [id number] [facility] [address]   4/1/2025  2:00 PM [facility] [address]   4/3/2025  2:00 PM [facility] [place] [id number] [facility] [address]   4/8/2025  2:00 PM [facility] [address]   4/10/2025  2:00 PM CARDIAC REHAB [place] [id number] [place] [address] [id number] [facility]   4/15/2025  2:00 PM CARDIAC REHAB [address]  [id number] [facility]   4/17/2025  2:00 PM [facility] [address] [id number]   4/22/2025  2:00 PM [facility] [address] [id number]   4/24/2025  2:00 PM [facility] [address] [id number]   5/1/2025 11:00 AM [place] [address] ECHO [id number] GEA [place]   5/13/2025 11:00 AM Georgette Ortiz MD PhD [id number] [facility]     ---------------------------------------------------------------------------------------------------  Subjective   No new events. Tolerating diet. Able to lay flat, no sob. She is still dizzy with position change. No palpitaitons, cp, sob.      ---------------------------------------------------------------------------------------------------  Objective   Last Recorded Vitals  Blood pressure 117/73, pulse 97, temperature 37.1 °C (98.8 °F), temperature source Temporal, resp. rate 16, height 1.575 m (5' 2\"), weight 112 kg (246 lb 7.6 oz), SpO2 93%.  Intake/Output last 3 Shifts:  I/O last 3 completed shifts:  In: 1240 (11.1 mL/kg) [P.O.:240; IV Piggyback:1000]  Out: 400 (3.6 mL/kg) [Urine:400 (0.1 mL/kg/hr)]  Weight: 111.8 kg     Physical Exam  Vitals and nursing note reviewed.   Constitutional:       General: She is not in acute distress.     Appearance: Normal appearance. She is obese. She is not ill-appearing.   HENT:      Head: Normocephalic and atraumatic.      Mouth/Throat:      Mouth: Mucous membranes are moist.   Eyes:      General: No scleral icterus.     Extraocular Movements: Extraocular movements intact.      Conjunctiva/sclera: Conjunctivae normal.   Cardiovascular:      Rate and Rhythm: Normal rate and regular rhythm.      Heart sounds: S1 normal and S2 normal. No murmur heard.  Pulmonary:      Effort: Pulmonary effort is normal. No respiratory distress.      Breath sounds: No wheezing, rhonchi or rales.   Abdominal:      General: Bowel sounds are normal. There is no distension.      Palpations: Abdomen is soft. "      Tenderness: There is no abdominal tenderness. There is no guarding or rebound.   Musculoskeletal:         General: No swelling or deformity.      Cervical back: Neck supple.   Skin:     General: Skin is warm and dry.      Findings: No rash.      Comments: Skin wrinkling present   Neurological:      General: No focal deficit present.      Mental Status: She is alert and oriented to person, place, and time. Mental status is at baseline.      Sensory: No sensory deficit.      Motor: No weakness.   Psychiatric:         Mood and Affect: Mood normal.         Behavior: Behavior normal.       Relevant Results  Lab Results   Component Value Date    WBC 6.4 03/17/2025    HGB 10.8 (L) 03/17/2025    HCT 35.4 (L) 03/17/2025    MCV 82 03/17/2025     03/17/2025      Lab Results   Component Value Date    GLUCOSE 82 03/17/2025    CALCIUM 9.4 03/17/2025     03/17/2025    K 5.0 03/17/2025    CO2 20 (L) 03/17/2025     (H) 03/17/2025    BUN 34 (H) 03/17/2025    CREATININE 1.83 (H) 03/17/2025     Scheduled medications  atorvastatin, 80 mg, oral, Nightly  heparin (porcine), 7,500 Units, subcutaneous, q8h AARON  insulin lispro, 0-10 Units, subcutaneous, TID AC  metoprolol succinate XL, 100 mg, oral, Daily  mirtazapine, 15 mg, oral, Nightly  polyethylene glycol, 17 g, oral, Daily  sodium bicarbonate, 1,300 mg, oral, BID  sodium chloride, 1,000 mL, intravenous, Once  topiramate, 50 mg, oral, BID      Continuous medications     PRN medications  PRN medications: acetaminophen **OR** acetaminophen, dextrose, dextrose, glucagon, glucagon, melatonin    Richard Becker MD

## 2025-03-18 ENCOUNTER — APPOINTMENT (OUTPATIENT)
Dept: CARDIAC REHAB | Facility: HOSPITAL | Age: 65
End: 2025-03-18
Payer: MEDICARE

## 2025-03-18 LAB
GLUCOSE BLD MANUAL STRIP-MCNC: 102 MG/DL (ref 74–99)
GLUCOSE BLD MANUAL STRIP-MCNC: 122 MG/DL (ref 74–99)

## 2025-03-18 PROCEDURE — 2500000001 HC RX 250 WO HCPCS SELF ADMINISTERED DRUGS (ALT 637 FOR MEDICARE OP): Performed by: NURSE PRACTITIONER

## 2025-03-18 PROCEDURE — 2500000004 HC RX 250 GENERAL PHARMACY W/ HCPCS (ALT 636 FOR OP/ED): Performed by: STUDENT IN AN ORGANIZED HEALTH CARE EDUCATION/TRAINING PROGRAM

## 2025-03-18 PROCEDURE — 99233 SBSQ HOSP IP/OBS HIGH 50: CPT | Performed by: STUDENT IN AN ORGANIZED HEALTH CARE EDUCATION/TRAINING PROGRAM

## 2025-03-18 PROCEDURE — 82947 ASSAY GLUCOSE BLOOD QUANT: CPT

## 2025-03-18 PROCEDURE — 1210000001 HC SEMI-PRIVATE ROOM DAILY

## 2025-03-18 RX ORDER — SODIUM CHLORIDE, SODIUM LACTATE, POTASSIUM CHLORIDE, CALCIUM CHLORIDE 600; 310; 30; 20 MG/100ML; MG/100ML; MG/100ML; MG/100ML
125 INJECTION, SOLUTION INTRAVENOUS CONTINUOUS
Status: ACTIVE | OUTPATIENT
Start: 2025-03-18 | End: 2025-03-19

## 2025-03-18 RX ADMIN — ACETAMINOPHEN 650 MG: 160 SOLUTION ORAL at 12:24

## 2025-03-18 RX ADMIN — ATORVASTATIN CALCIUM 80 MG: 80 TABLET, FILM COATED ORAL at 21:05

## 2025-03-18 RX ADMIN — TOPIRAMATE 50 MG: 50 TABLET, FILM COATED ORAL at 08:32

## 2025-03-18 RX ADMIN — METOPROLOL SUCCINATE 100 MG: 50 TABLET, EXTENDED RELEASE ORAL at 08:32

## 2025-03-18 RX ADMIN — SODIUM BICARBONATE 1300 MG: 650 TABLET ORAL at 21:05

## 2025-03-18 RX ADMIN — TOPIRAMATE 50 MG: 50 TABLET, FILM COATED ORAL at 22:58

## 2025-03-18 RX ADMIN — SODIUM BICARBONATE 1300 MG: 650 TABLET ORAL at 08:32

## 2025-03-18 RX ADMIN — MIRTAZAPINE 15 MG: 15 TABLET, FILM COATED ORAL at 21:05

## 2025-03-18 ASSESSMENT — PAIN SCALES - GENERAL
PAINLEVEL_OUTOF10: 0 - NO PAIN
PAINLEVEL_OUTOF10: 5 - MODERATE PAIN
PAINLEVEL_OUTOF10: 0 - NO PAIN
PAINLEVEL_OUTOF10: 0 - NO PAIN

## 2025-03-18 ASSESSMENT — COGNITIVE AND FUNCTIONAL STATUS - GENERAL
DAILY ACTIVITIY SCORE: 24
MOBILITY SCORE: 24

## 2025-03-18 ASSESSMENT — PAIN - FUNCTIONAL ASSESSMENT: PAIN_FUNCTIONAL_ASSESSMENT: 0-10

## 2025-03-18 NOTE — PROGRESS NOTES
Assessment/Plan     Leeann Shankar is a 64 y.o. female with a PMH of HTN, T2DM (last HgA1c 7.8% 3/8/24), HLD, OA, VERN, vit D deficiency, and chronic Hep C who presented with sob, dizziness and thigh pain. Pt endorsing URI sx, n/v and vomitting have that now been improving but she developed sob and dizziness. She is also have b/l thigh pain when she exerts herself/with ambulation. Sx now present for 3 weeks. She feels these are medication side effects. In the Ed she was found to have low bp. Labs showed hyperkalemia, NAGMA, hypermagnesemia, and SCARLET. D-dimer 391 on admit. CXR negative on admit. K cocktail administered. Cr  peaked at 2.05, associated with metabolic acidosis. Pt given fluids. Echo showing recovered EF to 50%, normal and collapsible IVC. Cr downtrending, bp improved. RUFINA of lower extremity reviewed, no acute findings.      -BP seems more stable.   -CSARLET is slightly improving, giving more fluid.          SCARLET on CKD, resolving   NAGMA, resolving   - Cr 2.05 GFR 27 on admit--> Cr 1.48 GFR 40 on 1/21/25  - currently ~1.7-1.8.  Etiology:  Medication Induced vs dehydration, likely related to fluid balance in setting of reported viral prodrome.    -  IVC was collapsible, EF was recovered to 50%. Holding BP meds except metop.    - continue fluids, reeval after volume optimized.   - I/o, daily weight  - hold home Entresto, Farxiga, and Aldactone d/t SCARLET  - Avoid nephrotoxic medication administration   - start sodium bicarb 650 mg PO BID, increased to 1300mg BID.  Bicarb 20.      Hyperkalemia resolved     Dizziness 2/2 orthostatic hypotension resolved      HFrEF (last EF 30-35% 4/30/24)    HTN   - continue to monitor BP  - DW, I&O  - appeared  dry on admission  - echo now with recoevered EF to 50%.   - continuing fluids 2/2 orthostatic hypotension, no signs of decompensation from heart failure signs/symptoms.   - last seen by Dr. Ortiz (cardiology) on 1/23/25. Per documentation from visit: Pt has been attending  cardiac rehab. Cardiac MRI with regadenoson challenge prior to next visit ( insurance clearance pending).Plan for repeat TTE and CPET once heart failure medication is at goal. Once medications are optimised will consider need for device therapy. Pt has appt scheduled with Cardiology at [facility] on 4/15 and with Dr. Ortiz on 5/13.   - c/w Toprol  mg PO every day  - holding home dose of Aldactone, Entresto, and Farxiga d/t SCARLET and orthostatics.      T2DM (last HgA1c 7.8% 3/8/24)  - accucheck ACHS  - Lispro SSI 0-10 units ordered  - hypoglycemia order set placed     HLD  - c/w home dose of Lipitor 80 mg PO at bedtime          Scheduled outpatient appointments in system:   Future Appointments   Date Time Provider Department Center   3/20/2025  2:00 PM [facility] [address]   3/25/2025  2:00 PM [facility] [address]   3/27/2025  2:00 PM [facility] [address]   4/1/2025  2:00 PM [facility] [address]   4/3/2025  2:00 PM [facility] [address]   4/8/2025  2:00 PM [facility] [address]   4/10/2025  2:00 PM [facility] RE[facility] [address]   4/15/2025  2:00 PM [facility] RE[facility]AB [address]   4/17/2025  2:00 PM [facility] [address]   4/22/2025  2:00 PM [facility] [address]   4/24/2025  2:00 PM [facility] RE[facility]AB [address]   5/1/2025 11:00 AM [place] [address] ECHO [id number] GEA [place]   5/13/2025 11:00 AM Georgette Ortiz MD PhD [id number] [place]     ---------------------------------------------------------------------------------------------------  Subjective   No new events.      "  ---------------------------------------------------------------------------------------------------  Objective   Last Recorded Vitals  Blood pressure 118/68, pulse 96, temperature 36.4 °C (97.5 °F), resp. rate 19, height 1.575 m (5' 2\"), weight 112 kg (246 lb 7.6 oz), SpO2 94%.  Intake/Output last 3 Shifts:  I/O last 3 completed shifts:  In: 300 (2.7 mL/kg) [P.O.:300]  Out: - (0 mL/kg)   Weight: 111.8 kg     Physical Exam  Vitals and nursing note reviewed.   Constitutional:       General: She is not in acute distress.     Appearance: Normal appearance. She is obese. She is not ill-appearing.   HENT:      Head: Normocephalic and atraumatic.      Mouth/Throat:      Mouth: Mucous membranes are moist.   Eyes:      General: No scleral icterus.     Extraocular Movements: Extraocular movements intact.      Conjunctiva/sclera: Conjunctivae normal.   Cardiovascular:      Rate and Rhythm: Normal rate and regular rhythm.      Heart sounds: S1 normal and S2 normal. No murmur heard.  Pulmonary:      Effort: Pulmonary effort is normal. No respiratory distress.      Breath sounds: No wheezing, rhonchi or rales.   Abdominal:      General: Bowel sounds are normal. There is no distension.      Palpations: Abdomen is soft.      Tenderness: There is no abdominal tenderness. There is no guarding or rebound.   Musculoskeletal:         General: No swelling or deformity.      Cervical back: Neck supple.   Skin:     General: Skin is warm and dry.      Findings: No rash.      Comments: Skin wrinkling present   Neurological:      General: No focal deficit present.      Mental Status: She is alert and oriented to person, place, and time. Mental status is at baseline.      Sensory: No sensory deficit.      Motor: No weakness.   Psychiatric:         Mood and Affect: Mood normal.         Behavior: Behavior normal.         Relevant Results  Lab Results   Component Value Date    WBC 6.4 03/17/2025    HGB 10.8 (L) 03/17/2025    HCT 35.4 (L) " 03/17/2025    MCV 82 03/17/2025     03/17/2025      Lab Results   Component Value Date    GLUCOSE 82 03/17/2025    CALCIUM 9.4 03/17/2025     03/17/2025    K 5.0 03/17/2025    CO2 20 (L) 03/17/2025     (H) 03/17/2025    BUN 34 (H) 03/17/2025    CREATININE 1.83 (H) 03/17/2025     Scheduled medications  atorvastatin, 80 mg, oral, Nightly  heparin (porcine), 7,500 Units, subcutaneous, q8h AARON  insulin lispro, 0-10 Units, subcutaneous, TID AC  metoprolol succinate XL, 100 mg, oral, Daily  mirtazapine, 15 mg, oral, Nightly  polyethylene glycol, 17 g, oral, Daily  sodium bicarbonate, 1,300 mg, oral, BID  topiramate, 50 mg, oral, BID      Continuous medications     PRN medications  PRN medications: acetaminophen **OR** acetaminophen, dextrose, dextrose, glucagon, glucagon, melatonin    Elina Ford MD

## 2025-03-18 NOTE — PROGRESS NOTES
Patient plans to return home upon discharge. She has aide services 7 days a week and is a resumption of care with [facility]. ADOD 1-2 days.    ROBERT Malhotra

## 2025-03-18 NOTE — NURSING NOTE
RN notified Elina Ford MD that the patient refused her I.V fluids and refused to have her blood glucose checked.

## 2025-03-19 VITALS
BODY MASS INDEX: 45.36 KG/M2 | SYSTOLIC BLOOD PRESSURE: 112 MMHG | OXYGEN SATURATION: 94 % | TEMPERATURE: 97.7 F | HEIGHT: 62 IN | HEART RATE: 110 BPM | WEIGHT: 246.47 LBS | DIASTOLIC BLOOD PRESSURE: 67 MMHG | RESPIRATION RATE: 16 BRPM

## 2025-03-19 LAB
ALBUMIN SERPL BCP-MCNC: 4.3 G/DL (ref 3.4–5)
ANION GAP SERPL CALC-SCNC: 13 MMOL/L (ref 10–20)
BUN SERPL-MCNC: 27 MG/DL (ref 6–23)
CALCIUM SERPL-MCNC: 9.5 MG/DL (ref 8.6–10.6)
CHLORIDE SERPL-SCNC: 108 MMOL/L (ref 98–107)
CO2 SERPL-SCNC: 21 MMOL/L (ref 21–32)
CREAT SERPL-MCNC: 1.75 MG/DL (ref 0.5–1.05)
EGFRCR SERPLBLD CKD-EPI 2021: 32 ML/MIN/1.73M*2
GLUCOSE BLD MANUAL STRIP-MCNC: 151 MG/DL (ref 74–99)
GLUCOSE SERPL-MCNC: 114 MG/DL (ref 74–99)
PHOSPHATE SERPL-MCNC: 3.9 MG/DL (ref 2.5–4.9)
POTASSIUM SERPL-SCNC: 4.5 MMOL/L (ref 3.5–5.3)
SODIUM SERPL-SCNC: 137 MMOL/L (ref 136–145)

## 2025-03-19 PROCEDURE — 36415 COLL VENOUS BLD VENIPUNCTURE: CPT | Performed by: STUDENT IN AN ORGANIZED HEALTH CARE EDUCATION/TRAINING PROGRAM

## 2025-03-19 PROCEDURE — 97161 PT EVAL LOW COMPLEX 20 MIN: CPT | Mod: GP

## 2025-03-19 PROCEDURE — 2500000001 HC RX 250 WO HCPCS SELF ADMINISTERED DRUGS (ALT 637 FOR MEDICARE OP): Performed by: NURSE PRACTITIONER

## 2025-03-19 PROCEDURE — 82947 ASSAY GLUCOSE BLOOD QUANT: CPT

## 2025-03-19 PROCEDURE — 80069 RENAL FUNCTION PANEL: CPT | Performed by: STUDENT IN AN ORGANIZED HEALTH CARE EDUCATION/TRAINING PROGRAM

## 2025-03-19 PROCEDURE — 2500000001 HC RX 250 WO HCPCS SELF ADMINISTERED DRUGS (ALT 637 FOR MEDICARE OP): Performed by: STUDENT IN AN ORGANIZED HEALTH CARE EDUCATION/TRAINING PROGRAM

## 2025-03-19 PROCEDURE — 2500000004 HC RX 250 GENERAL PHARMACY W/ HCPCS (ALT 636 FOR OP/ED): Performed by: STUDENT IN AN ORGANIZED HEALTH CARE EDUCATION/TRAINING PROGRAM

## 2025-03-19 PROCEDURE — 99239 HOSP IP/OBS DSCHRG MGMT >30: CPT | Performed by: STUDENT IN AN ORGANIZED HEALTH CARE EDUCATION/TRAINING PROGRAM

## 2025-03-19 RX ORDER — ATORVASTATIN CALCIUM 80 MG/1
80 TABLET, FILM COATED ORAL DAILY
Qty: 30 TABLET | Refills: 0 | Status: SHIPPED | OUTPATIENT
Start: 2025-03-19 | End: 2025-04-18

## 2025-03-19 RX ORDER — TALC
6 POWDER (GRAM) TOPICAL NIGHTLY PRN
Qty: 10 TABLET | Refills: 0 | Status: SHIPPED | OUTPATIENT
Start: 2025-03-19

## 2025-03-19 RX ORDER — ACETAMINOPHEN 325 MG/1
650 TABLET ORAL EVERY 6 HOURS PRN
Qty: 30 TABLET | Refills: 0 | Status: SHIPPED | OUTPATIENT
Start: 2025-03-19

## 2025-03-19 RX ADMIN — TOPIRAMATE 50 MG: 50 TABLET, FILM COATED ORAL at 08:54

## 2025-03-19 RX ADMIN — METOPROLOL SUCCINATE 100 MG: 50 TABLET, EXTENDED RELEASE ORAL at 08:54

## 2025-03-19 RX ADMIN — SODIUM BICARBONATE 1300 MG: 650 TABLET ORAL at 08:54

## 2025-03-19 ASSESSMENT — PAIN - FUNCTIONAL ASSESSMENT: PAIN_FUNCTIONAL_ASSESSMENT: 0-10

## 2025-03-19 ASSESSMENT — PAIN SCALES - GENERAL
PAINLEVEL_OUTOF10: 0 - NO PAIN
PAINLEVEL_OUTOF10: 0 - NO PAIN

## 2025-03-19 ASSESSMENT — COGNITIVE AND FUNCTIONAL STATUS - GENERAL
MOBILITY SCORE: 18
CLIMB 3 TO 5 STEPS WITH RAILING: A LITTLE
STANDING UP FROM CHAIR USING ARMS: A LITTLE
WALKING IN HOSPITAL ROOM: A LITTLE
MOVING TO AND FROM BED TO CHAIR: A LITTLE
MOVING FROM LYING ON BACK TO SITTING ON SIDE OF FLAT BED WITH BEDRAILS: A LITTLE
TURNING FROM BACK TO SIDE WHILE IN FLAT BAD: A LITTLE

## 2025-03-19 ASSESSMENT — ACTIVITIES OF DAILY LIVING (ADL): ADL_ASSISTANCE: INDEPENDENT

## 2025-03-19 NOTE — CARE PLAN
Problem: Pain - Adult  Goal: Verbalizes/displays adequate comfort level or baseline comfort level  Outcome: Progressing     Problem: Safety - Adult  Goal: Free from fall injury  Outcome: Progressing     Problem: Discharge Planning  Goal: Discharge to home or other facility with appropriate resources  Outcome: Progressing     Problem: Chronic Conditions and Co-morbidities  Goal: Patient's chronic conditions and co-morbidity symptoms are monitored and maintained or improved  Outcome: Progressing     Problem: Nutrition  Goal: Nutrient intake appropriate for maintaining nutritional needs  Outcome: Progressing     Problem: Diabetes  Goal: Achieve decreasing blood glucose levels by end of shift  Outcome: Progressing  Goal: Increase stability of blood glucose readings by end of shift  Outcome: Progressing  Goal: Decrease in ketones present in urine by end of shift  Outcome: Progressing  Goal: Maintain electrolyte levels within acceptable range throughout shift  Outcome: Progressing  Goal: Maintain glucose levels >70mg/dl to <250mg/dl throughout shift  Outcome: Progressing  Goal: No changes in neurological exam by end of shift  Outcome: Progressing  Goal: Learn about and adhere to nutrition recommendations by end of shift  Outcome: Progressing  Goal: Vital signs within normal range for age by end of shift  Outcome: Progressing  Goal: Increase self care and/or family involovement by end of shift  Outcome: Progressing  Goal: Receive DSME education by end of shift  Outcome: Progressing      
  Problem: Pain - Adult  Goal: Verbalizes/displays adequate comfort level or baseline comfort level  Outcome: Progressing     Problem: Safety - Adult  Goal: Free from fall injury  Outcome: Progressing     Problem: Discharge Planning  Goal: Discharge to home or other facility with appropriate resources  Outcome: Progressing     Problem: Chronic Conditions and Co-morbidities  Goal: Patient's chronic conditions and co-morbidity symptoms are monitored and maintained or improved  Outcome: Progressing     Problem: Nutrition  Goal: Nutrient intake appropriate for maintaining nutritional needs  Outcome: Progressing     Problem: Diabetes  Goal: Achieve decreasing blood glucose levels by end of shift  Outcome: Progressing  Goal: Increase stability of blood glucose readings by end of shift  Outcome: Progressing  Goal: Decrease in ketones present in urine by end of shift  Outcome: Progressing  Goal: Maintain electrolyte levels within acceptable range throughout shift  Outcome: Progressing  Goal: Maintain glucose levels >70mg/dl to <250mg/dl throughout shift  Outcome: Progressing  Goal: No changes in neurological exam by end of shift  Outcome: Progressing  Goal: Learn about and adhere to nutrition recommendations by end of shift  Outcome: Progressing  Goal: Vital signs within normal range for age by end of shift  Outcome: Progressing  Goal: Increase self care and/or family involovement by end of shift  Outcome: Progressing  Goal: Receive DSME education by end of shift  Outcome: Progressing   The patient's goals for the shift include      The clinical goals for the shift include Pt will remain hemodynamically stable throughout this shift.    Over the shift, the patient did not make progress toward the following goals. Barriers to progression include SCARLET. Recommendations to address these barriers include continuation of care.    
  Problem: Pain - Adult  Goal: Verbalizes/displays adequate comfort level or baseline comfort level  Outcome: Progressing     Problem: Safety - Adult  Goal: Free from fall injury  Outcome: Progressing     Problem: Discharge Planning  Goal: Discharge to home or other facility with appropriate resources  Outcome: Progressing     Problem: Chronic Conditions and Co-morbidities  Goal: Patient's chronic conditions and co-morbidity symptoms are monitored and maintained or improved  Outcome: Progressing     Problem: Nutrition  Goal: Nutrient intake appropriate for maintaining nutritional needs  Outcome: Progressing     Problem: Diabetes  Goal: Achieve decreasing blood glucose levels by end of shift  Outcome: Progressing  Goal: Increase stability of blood glucose readings by end of shift  Outcome: Progressing  Goal: Decrease in ketones present in urine by end of shift  Outcome: Progressing  Goal: Maintain electrolyte levels within acceptable range throughout shift  Outcome: Progressing  Goal: Maintain glucose levels >70mg/dl to <250mg/dl throughout shift  Outcome: Progressing  Goal: No changes in neurological exam by end of shift  Outcome: Progressing  Goal: Learn about and adhere to nutrition recommendations by end of shift  Outcome: Progressing  Goal: Vital signs within normal range for age by end of shift  Outcome: Progressing  Goal: Increase self care and/or family involovement by end of shift  Outcome: Progressing  Goal: Receive DSME education by end of shift  Outcome: Progressing   The patient's goals for the shift include      The clinical goals for the shift include patient will be free from injury and falls    Over the shift, the patient did not make progress toward the following goals. Barriers to progression include SCARLET. Recommendations to address these barriers include continuation of care.    
The patient's goals for the shift include      The clinical goals for the shift include Pt respiratory status will remain stable during this shift.    Problem: Pain - Adult  Goal: Verbalizes/displays adequate comfort level or baseline comfort level  Outcome: Progressing     Problem: Safety - Adult  Goal: Free from fall injury  Outcome: Progressing     Problem: Nutrition  Goal: Nutrient intake appropriate for maintaining nutritional needs  Outcome: Progressing     Problem: Diabetes  Goal: Increase stability of blood glucose readings by end of shift  Outcome: Progressing     
The patient's goals for the shift include      The clinical goals for the shift include Pt will remain hemodynamically stable throughout shift.    Problem: Pain - Adult  Goal: Verbalizes/displays adequate comfort level or baseline comfort level  Outcome: Progressing     Problem: Safety - Adult  Goal: Free from fall injury  Outcome: Progressing     Problem: Discharge Planning  Goal: Discharge to home or other facility with appropriate resources  Outcome: Progressing     Problem: Chronic Conditions and Co-morbidities  Goal: Patient's chronic conditions and co-morbidity symptoms are monitored and maintained or improved  Outcome: Progressing     Problem: Nutrition  Goal: Nutrient intake appropriate for maintaining nutritional needs  Outcome: Progressing     
The patient's goals for the shift include      The clinical goals for the shift include Pt will remain hemodynamically stable throughout this shift.    Problem: Pain - Adult  Goal: Verbalizes/displays adequate comfort level or baseline comfort level  Outcome: Progressing     Problem: Safety - Adult  Goal: Free from fall injury  Outcome: Progressing     Problem: Discharge Planning  Goal: Discharge to home or other facility with appropriate resources  Outcome: Progressing     Problem: Chronic Conditions and Co-morbidities  Goal: Patient's chronic conditions and co-morbidity symptoms are monitored and maintained or improved  Outcome: Progressing     Problem: Nutrition  Goal: Nutrient intake appropriate for maintaining nutritional needs  Outcome: Progressing     Problem: Diabetes  Goal: Achieve decreasing blood glucose levels by end of shift  Outcome: Progressing  Goal: Increase stability of blood glucose readings by end of shift  Outcome: Progressing  Goal: Decrease in ketones present in urine by end of shift  Outcome: Progressing  Goal: Maintain electrolyte levels within acceptable range throughout shift  Outcome: Progressing  Goal: Maintain glucose levels >70mg/dl to <250mg/dl throughout shift  Outcome: Progressing  Goal: No changes in neurological exam by end of shift  Outcome: Progressing  Goal: Learn about and adhere to nutrition recommendations by end of shift  Outcome: Progressing  Goal: Vital signs within normal range for age by end of shift  Outcome: Progressing  Goal: Increase self care and/or family involovement by end of shift  Outcome: Progressing  Goal: Receive DSME education by end of shift  Outcome: Progressing     
The patient's goals for the shift include  to go home, help when needed    The clinical goals for the shift include Pt will remain hemodynamically stable throughout shift.      Problem: Pain - Adult  Goal: Verbalizes/displays adequate comfort level or baseline comfort level  Outcome: Progressing     Problem: Safety - Adult  Goal: Free from fall injury  Outcome: Progressing     Problem: Discharge Planning  Goal: Discharge to home or other facility with appropriate resources  Outcome: Progressing     Problem: Chronic Conditions and Co-morbidities  Goal: Patient's chronic conditions and co-morbidity symptoms are monitored and maintained or improved  Outcome: Progressing     Problem: Nutrition  Goal: Nutrient intake appropriate for maintaining nutritional needs  Outcome: Progressing     Problem: Diabetes  Goal: Achieve decreasing blood glucose levels by end of shift  Outcome: Progressing  Goal: Increase stability of blood glucose readings by end of shift  Outcome: Progressing  Goal: Decrease in ketones present in urine by end of shift  Outcome: Progressing  Goal: Maintain electrolyte levels within acceptable range throughout shift  Outcome: Progressing  Goal: Maintain glucose levels >70mg/dl to <250mg/dl throughout shift  Outcome: Progressing  Goal: No changes in neurological exam by end of shift  Outcome: Progressing  Goal: Learn about and adhere to nutrition recommendations by end of shift  Outcome: Progressing  Goal: Vital signs within normal range for age by end of shift  Outcome: Progressing  Goal: Increase self care and/or family involovement by end of shift  Outcome: Progressing  Goal: Receive DSME education by end of shift  Outcome: Progressing     
room air

## 2025-03-19 NOTE — PROGRESS NOTES
"Physical Therapy    Physical Therapy Evaluation    Patient Name: Leeann Shankar \"Chery\"  MRN: [id number]  Department: [facility]  Room: [address] [place] [address]  Today's Date: 3/19/2025   Time Calculation  Start Time: 1047  Stop Time: 1057  Time Calculation (min): 10 min    Assessment/Plan   PT Assessment  Barriers to Discharge Home: No anticipated barriers  Evaluation/Treatment Tolerance: Patient tolerated treatment well  Medical Staff Made Aware: Yes  End of Session Communication: Bedside nurse  Assessment Comment: Patient is a 65yo F presenting with SOB. Patient supervision for mobility at this time, has assist as needed at home. no further acute PT needs  End of Session Patient Position: Bed, 3 rail up, Alarm off, not on at start of session (sitting EOB)  IP OR SWING BED PT PLAN  Inpatient or Swing Bed: Inpatient  PT Plan  PT Plan: PT Eval only  PT Eval Only Reason: At baseline function  PT Frequency: PT eval only  PT Discharge Recommendations: No further acute PT  PT - OK to Discharge: Yes    Subjective   General Visit Information:  General  Reason for Referral: SOB, thigh pain  Past Medical History Relevant to Rehab: HTN, T2DM (last HgA1c 7.8% 3/8/24), HLD, OA, VERN, vit D deficiency, and chronic Hep C  Prior to Session Communication: Bedside nurse  Patient Position Received: Up in bathroom  General Comment: pt up in bathroom, agreeable to PT. RN cleared.  Home Living:  Home Living  Type of Home: House  Lives With:  (dtr)  Home Adaptive Equipment:  (rollator)  Home Layout: One level  Home Access: Stairs to enter with rails  Entrance Stairs-Number of Steps: 5-6  Prior Level of Function:  Prior Function Per Pt/Caregiver Report  Level of [place]: Independent with ADLs and functional transfers, Independent with homemaking with ambulation  Receives Help From:  (dtr assists, has aide to assist with cleaning)  ADL Assistance: Independent  Homemaking Assistance: Independent (aide and dtr assist)  Ambulatory Assistance: " Independent  Prior Function Comments: reports recently having pain and unable to go to OP PT  Precautions:  Precautions  Medical Precautions: Fall precautions      Date/Time Vitals Session Patient Position Pulse Resp SpO2 BP MAP (mmHg)    03/19/25 1047 --  --  109  --  99 %  --  --           Vital Signs Comment: during ambulation     Objective   Pain:  Pain Assessment  Pain Assessment: 0-10  0-10 (Numeric) Pain Score: 0 - No pain  Cognition:  Cognition  Overall Cognitive Status: Within Functional Limits    General Assessments:     Activity Tolerance  Endurance: Endurance does not limit participation in activity    Sensation  Light Touch: No apparent deficits       Functional Assessments:  Bed Mobility  Bed Mobility:  (no up in room)    Transfers  Transfer: Yes  Transfer 1  Transfer From 1: Sit to, Stand to  Transfer to 1: Stand, Sit  Technique 1: Sit to stand, Stand to sit  Transfer Level of Assistance 1: Distant supervision  Trials/Comments 1: stood twice from EOB    Ambulation/Gait Training  Ambulation/Gait Training Performed: Yes  Ambulation/Gait Training 1  Surface 1: Level tile  Device 1: No device  Assistance 1: Distant supervision  Quality of Gait 1: Wide base of support  Comments/Distance (ft) 1: steady, ambulated 120' x2  Extremity/Trunk Assessments:  RLE   RLE : Within Functional Limits  LLE   LLE : Within Functional Limits  Outcome Measures:  [facility] Basic Mobility  Turning from your back to your side while in a flat bed without using bedrails: A little  Moving from lying on your back to sitting on the side of a flat bed without using bedrails: A little  Moving to and from bed to chair (including a wheelchair): A little  Standing up from a chair using your arms (e.g. wheelchair or bedside chair): A little  To walk in hospital room: A little  Climbing 3-5 steps with railing: A little  Basic Mobility - Total Score: 18    Encounter Problems       Encounter Problems (Active)       Pain - Adult               Education Documentation  Precautions, taught by Nataliia Garcia PT at 3/19/2025 11:09 AM.  Learner: Patient  Readiness: Acceptance  Method: Explanation  Response: Verbalizes Understanding  Comment: edu on role of PT, dc plan and safety    Mobility Training, taught by Nataliia Garcia PT at 3/19/2025 11:09 AM.  Learner: Patient  Readiness: Acceptance  Method: Explanation  Response: Verbalizes Understanding  Comment: edu on role of PT, dc plan and safety    Education Comments  No comments found.

## 2025-03-19 NOTE — NURSING NOTE
3/19/2025   1815  Patient discharged to home, Discharged instructions given to patient and reviewed. IV discontinued. Patient packed own belongings. Transport called for patient to be taken to [facility] where her daughter was waiting.   Althea Harper RN.

## 2025-03-20 ENCOUNTER — APPOINTMENT (OUTPATIENT)
Dept: CARDIAC REHAB | Facility: HOSPITAL | Age: 65
End: 2025-03-20
Payer: MEDICARE

## 2025-03-25 ENCOUNTER — APPOINTMENT (OUTPATIENT)
Dept: CARDIAC REHAB | Facility: HOSPITAL | Age: 65
End: 2025-03-25
Payer: MEDICARE

## 2025-03-25 ENCOUNTER — TELEPHONE (OUTPATIENT)
Dept: CARDIAC REHAB | Facility: HOSPITAL | Age: 65
End: 2025-03-25
Payer: COMMERCIAL

## 2025-03-25 NOTE — PROGRESS NOTES
PATIENT: Leeann Shankar  DATE: 3/25/2025    Patient has attended only 2 cardiac rehab sessions back in January and has not swapnil able to come to rehab due to rotator suff injury. Called patient regarding missed Cardiac Rehab at [facility].  Patient did not answered the call; left a voicemail stating that she is being discharged from the program due to noncompliance.    Kathya Acevedo, ACSM-EP

## 2025-03-27 ENCOUNTER — DOCUMENTATION (OUTPATIENT)
Dept: CARDIAC REHAB | Facility: HOSPITAL | Age: 65
End: 2025-03-27
Payer: COMMERCIAL

## 2025-03-27 ENCOUNTER — APPOINTMENT (OUTPATIENT)
Dept: CARDIAC REHAB | Facility: HOSPITAL | Age: 65
End: 2025-03-27
Payer: MEDICARE

## 2025-03-27 NOTE — PROGRESS NOTES
Cardiac Rehabilitation Discharge Summary    Name: Leeann Shankar   Medical Record Number: [id number]  YOB: 1960   Age: 64 y.o.  Today’s Date: 3/27/2025   Primary Care Physician: No primary care provider on file.   Referring Physician: Georgette Ortiz MD*   Program Location: [address]         General  Primary Diagnosis:   1. Hypertensive heart disease with heart failure  Referral to Cardiac Rehab    Follow Up In Cardiac Rehab         Onset/Date of Diagnosis: 03/04/24   Session #: 2  AACVPR Risk Stratification: High   Falls Risk: Low    Psychosocial Reassessment:    Pre PHQ-9: 7   Sent PH-Q 9 to MD if score > 20: No; score < 20    Pt reported/currently experiencing stress: Yes; Anxiety; Severity: moderate and Depression; Severity: moderate  Patient uses stress management skills: Yes , prayer  History of:  anxiety and depression  Currently seeing a mental health provider: No  Social Support: Yes, Whom:daughter (aid), kids, grandchildren  Quality of Life Survey: KCCQ (see Heart Failure Management below)    Learning Assessment:  Learning assessment/barriers: Work  Preferred learning method: Auditory, Visual, Reading handout, and Writing handout  Barriers: None  Comments:    Stages of Change: Action    Psychosocial Plan  Goal Status: In progress  Psychosocial Goals: Demonstrating proper techniques for stress management, Maintain or lower PH-Q 9 score by discharge, and Identify strategies for managing depression    Psychosocial Interventions/Education:   1/31/2025 Could not reassess stress due to lack of attendance.  02/28/2025 Could not reassess stress due to lack of attendance.  03/27/2025 Could not reassess stress due to lack of attendance; see the staff comments below.    Nutrition Reassessment:     Diet Habit Survey: Picture Your Plate  Pre: Survey not yet returned by patient.  Post: NA    Survey results reviewed with Dietician: Not at this time    Lipids Assessment  Current Dietary  Guidelines:  Regular  Barriers to dietary change: no  Hyperlipidemia: Yes , takes atorvastatin  Lab Results   Component Value Date    CHOL 127 10/11/2024    HDL 45.0 10/11/2024    LDLCALC 69 10/11/2024    TRIG 67 10/11/2024     Diabetes Assessment  History of Diabetes: Yes Pt monitors BS at home: Yes   Frequency: every other /day  FBS range: < 150  Hypoglycemic Episodes: No   Lab Results   Component Value Date    HGBA1C 7.8 (H) 03/08/2025      Weight Management        Nutrition Plan  Goal Status: In progress  Nutrition Goals: Improve Diet Habit Survey score by 5-10 points by discharge, Adapt a low-sodium, DASH diet prior to discharge, Adapt a Mediterranean focused diet prior to discharge, Learn how to read and interpret nutrition labels prior to discharge, Lose 1lb/week while enrolled in program, Improve HgbA1C prior to discharge, Check blood glucose daily, and Verbalize S/S of hypoglycemia or hyperglycemia by discharge    Nutrition Interventions/Education:   1/31/2025 See the staff comments below.  02/25/2025 Could not reassess due to lack of attendance.  03/27/2025 Could not reassess due to lack of attendance; see the staff comments below.    Exercise Reassessment:   Current Home Exercise: Has full gym at home in basement (bike, treadmill, row machine, and weights); also swims but has not gone for the past month  Current Home Exercise?: No  Mode: NA  Frequency: NA  Duration: NA     Exercise Prescription  Exercise Prescription based on:   [facility] Activity Status Index (DASI)  DASI Score: 7.2   MET Score: 3.6     Patient health history  Frequency:  2 days/week  Mode: Treadmill, NuStep, and Recumbent Cycle  Duration: 24 total aerobic minutes  Intensity: RPE 12-16  Target HR:  To be calculated after 6 attended sessions.  MET Level: 2.6  Patient wears supplemental O2: No   Modality Workload METs Duration (minutes)   1 Pre-Exercise      2 Session Warm Up   5 : 00   3 Treadmill 2.0 mph @ 0 % 2.6 8 : 00   4 NuStep 4000 3  load @ 52 treadwell 2.6 8 : 00   5 Recumbent Bike 2 load @ 55 rpms 2.2 8 : 00   6 Post-Exercise         Resistance Training: No   Home Exercise Prescription given: To be given prior to discharge from program.    Exercise Plan  Goal Status: In progress  Exercise Goals: Increase exercise MET level by 5-10% each week, Increase total exercise duration to 30-45 minutes, Obtain 150 minutes/week of moderate intensity aerobic exercise, Initiate strength training 2-3 days a week, Initiate flexibility training 2-3 days a week, and Establish a home exercise program before discharge    Exercise Interventions/Education:   1/31/2025 Could not progress exercise due to lack of attended sessions.  2/25/2025 Could not progress exercise due to lack of attended sessions.  03/27/2025 Could not reassess due to lack of attendance; see the staff comments below.    Other Core Components/Risk Factor Reassessment:   Medication adherence  Medication compliance: Yes  Uses pill box/organizer: Yes   Carries medication list: Yes   Current Medications:   Medication Documentation Review Audit       Reviewed by JAYCOB Fleming (Nurse Practitioner) on 03/15/25 at 0159      Medication Order Taking? Sig Documenting Provider Last Dose Status   atorvastatin (Lipitor) 80 mg tablet [id number]  Take 1 tablet (80 mg) by mouth once daily. Historical Provider, MD  Active   blood pressure monitor (Blood Pressure Kit) kit [id number]  Take blood pressure 1-2 times a day Georgette Ortiz MD PhD  Active   blood sugar diagnostic strip [id number]  Use as instructed Historical Provider, MD  Active   dapagliflozin propanediol (Farxiga) 10 mg [id number]  Take 1 tablet (10 mg) by mouth once daily. Georgette Ortiz MD PhD  Active   diclofenac sodium (Voltaren) 1 % gel [id number]  Apply 4.5 inches (4 g) topically. Historical Provider, MD  Active   lidocaine (lidocaine HCL) 4 % cream [id number]  Apply thin layer to affected area every 4 hours as needed for pain  Historical Provider, MD  Active    Discontinued 03/15/25 0159   metoprolol succinate XL (Toprol-XL) 100 mg 24 hr tablet [id number]  Take 1 tablet (100 mg) by mouth once daily. Do not crush or chew. Georgette Ortiz MD PhD  Active   mirtazapine (Remeron) 15 mg tablet [id number]  Take 1 tablet (15 mg) by mouth once daily at bedtime. Historical Provider, MD  Active   Mounjaro 15 mg/0.5 mL pen injector [id number] Yes Inject 15 mg under the skin 1 (one) time per week. Historical Provider, MD  Active   OneTouch Delica Plus Lancet 33 gauge misc [id number]   Historical Provider, MD  Active   OneTouch Ultra2 Meter misc [id number]   Historical Provider, MD  Active   polyethylene glycol (Glycolax, Miralax) 17 gram packet [id number]  Take 17 g by mouth once daily. Historical Provider, MD  Active   sacubitriL-valsartan (Entresto)  mg tablet [id number]  Take 1 tablet by mouth 2 times a day. Georgette Ortiz MD PhD  Active   spironolactone (Aldactone) 25 mg tablet [id number]  Take 1 tablet (25 mg) by mouth once daily. Georgette Ortiz MD PhD  Active   topiramate (Topamax) 50 mg tablet [id number]  Take 1 tablet (50 mg) by mouth 2 times a day. Historical Provider, MD  Active                   Is patient prescribed Nitroglycerin? No  Blood Pressure Management  History of Hypertension: Yes   Medication Changes: No   Resting BP:  There were no vitals taken for this visit.      Heart Failure Management  Hx of Heart Failure: Yes, Type (selection): HFrEF  Most recent EF: Echocardiogram - LVEF (%): 30     Onset of heart failure diagnosis: 3/2024  Last heart failure hospitalization: 3/3/24  Number of HF admissions per year: 1    Symptoms: Fatigue with exertion, Shortness of breath, and Orthopnea (sleeps on stomach)  Is there a family Hx of HF: Yes   Does patient obtain daily weight No , has scale     KCCQ survey: Pre: 76.88  Post: To be done at discharge    Heart Failure Reassessment Heart Failure Symptom Management: Initial Treatment  Plan. Will reassess in 30 days.    Heart Failure Goals: Able to verbalize signs and symptoms of fluid retention and when to contact MD, Adhere to proper fluid restrictions, Adapt a low sodium diet and verbalize guidelines, Obtain daily weight and verbalize proper method of obtaining weights    Smoking/Tobacco Assessment  Social History     Tobacco Use    Smoking status: Former     Types: Cigarettes    Smokeless tobacco: Never   Substance Use Topics    Alcohol use: Not Currently    Drug use: Not Currently      Other Core Component Plan  Goal Status: In progress  Other Core Component Goals: Verbalize medication usage and drug actions by discharge and Achieve resting BP of < 130/80 by discharge    Other Core Component Interventions/Education:   02/25/2025 Could not reassess due to lack of attendance.  03/27/2025 Could not reassess due to lack of attendance; see the staff comments below.    Individual Patient Goals:  Resume swimming by discharge.  Establish home exercise regimen by discharge from program.  Goal Status: In progress    Staff Comments:  Leeann has completed 2 sessions of Cardiac Rehab and discharged from the program due to lack of attendance. Patient  has not been consistent with attendance. Upon contact, patient reports to be in acute right shoulder pain due to torn rotator cuff muscle. Patient wants to hold the rehab for a month. Patient did not returned to rehab and has attended only 2 cardiac rehab sessions back in January and has not swapnil able to come to rehab due to rotator suff injury. Patient has not changed intensity and duration on Treadmill, NuStep, and Recumbent Cycle modalities.  No cardiac complaints have been voiced and no ectopy has been noted on ECG. BP have been WNL. Thank you for your referral.    Rehab Staff Signature: Kathya Acevedo [facility]

## 2025-04-01 ENCOUNTER — APPOINTMENT (OUTPATIENT)
Dept: CARDIAC REHAB | Facility: HOSPITAL | Age: 65
End: 2025-04-01
Payer: MEDICARE

## 2025-04-03 ENCOUNTER — APPOINTMENT (OUTPATIENT)
Dept: CARDIAC REHAB | Facility: HOSPITAL | Age: 65
End: 2025-04-03
Payer: MEDICARE

## 2025-04-08 ENCOUNTER — APPOINTMENT (OUTPATIENT)
Dept: CARDIAC REHAB | Facility: HOSPITAL | Age: 65
End: 2025-04-08
Payer: MEDICARE

## 2025-04-10 ENCOUNTER — APPOINTMENT (OUTPATIENT)
Dept: CARDIAC REHAB | Facility: HOSPITAL | Age: 65
End: 2025-04-10
Payer: MEDICARE

## 2025-04-15 ENCOUNTER — APPOINTMENT (OUTPATIENT)
Dept: CARDIAC REHAB | Facility: HOSPITAL | Age: 65
End: 2025-04-15
Payer: MEDICARE

## 2025-04-17 ENCOUNTER — APPOINTMENT (OUTPATIENT)
Dept: CARDIOLOGY | Facility: HOSPITAL | Age: 65
End: 2025-04-17
Payer: COMMERCIAL

## 2025-04-17 ENCOUNTER — APPOINTMENT (OUTPATIENT)
Dept: CARDIAC REHAB | Facility: HOSPITAL | Age: 65
End: 2025-04-17
Payer: MEDICARE

## 2025-04-22 ENCOUNTER — APPOINTMENT (OUTPATIENT)
Dept: CARDIAC REHAB | Facility: HOSPITAL | Age: 65
End: 2025-04-22
Payer: MEDICARE

## 2025-04-24 ENCOUNTER — APPOINTMENT (OUTPATIENT)
Dept: CARDIAC REHAB | Facility: HOSPITAL | Age: 65
End: 2025-04-24
Payer: COMMERCIAL

## 2025-05-01 ENCOUNTER — APPOINTMENT (OUTPATIENT)
Facility: CLINIC | Age: 65
End: 2025-05-01
Payer: MEDICARE

## 2025-05-13 ENCOUNTER — APPOINTMENT (OUTPATIENT)
Dept: CARDIOLOGY | Facility: CLINIC | Age: 65
End: 2025-05-13
Payer: COMMERCIAL

## 2025-05-29 ENCOUNTER — APPOINTMENT (OUTPATIENT)
Facility: CLINIC | Age: 65
End: 2025-05-29
Payer: COMMERCIAL

## 2025-06-19 ENCOUNTER — OFFICE VISIT (OUTPATIENT)
Dept: CARDIOLOGY | Facility: HOSPITAL | Age: 65
End: 2025-06-19
Payer: MEDICARE

## 2025-06-19 VITALS
HEART RATE: 81 BPM | OXYGEN SATURATION: 94 % | DIASTOLIC BLOOD PRESSURE: 78 MMHG | WEIGHT: 244 LBS | BODY MASS INDEX: 44.63 KG/M2 | SYSTOLIC BLOOD PRESSURE: 151 MMHG

## 2025-06-19 DIAGNOSIS — N17.9 AKI (ACUTE KIDNEY INJURY): ICD-10-CM

## 2025-06-19 DIAGNOSIS — E78.5 DYSLIPIDEMIA: Primary | ICD-10-CM

## 2025-06-19 DIAGNOSIS — I50.20 HEART FAILURE WITH REDUCED EJECTION FRACTION: ICD-10-CM

## 2025-06-19 PROCEDURE — 99215 OFFICE O/P EST HI 40 MIN: CPT | Performed by: STUDENT IN AN ORGANIZED HEALTH CARE EDUCATION/TRAINING PROGRAM

## 2025-06-19 PROCEDURE — 1036F TOBACCO NON-USER: CPT | Performed by: STUDENT IN AN ORGANIZED HEALTH CARE EDUCATION/TRAINING PROGRAM

## 2025-06-19 PROCEDURE — 3060F POS MICROALBUMINURIA REV: CPT | Performed by: STUDENT IN AN ORGANIZED HEALTH CARE EDUCATION/TRAINING PROGRAM

## 2025-06-19 PROCEDURE — 3078F DIAST BP <80 MM HG: CPT | Performed by: STUDENT IN AN ORGANIZED HEALTH CARE EDUCATION/TRAINING PROGRAM

## 2025-06-19 PROCEDURE — 99211 OFF/OP EST MAY X REQ PHY/QHP: CPT

## 2025-06-19 PROCEDURE — 3077F SYST BP >= 140 MM HG: CPT | Performed by: STUDENT IN AN ORGANIZED HEALTH CARE EDUCATION/TRAINING PROGRAM

## 2025-06-19 RX ORDER — SPIRONOLACTONE 25 MG/1
12.5 TABLET ORAL DAILY
Qty: 15 TABLET | Refills: 11 | Status: SHIPPED | OUTPATIENT
Start: 2025-06-19 | End: 2026-06-19

## 2025-06-19 RX ORDER — DAPAGLIFLOZIN 5 MG/1
5 TABLET, FILM COATED ORAL DAILY
Qty: 30 TABLET | Refills: 11 | Status: SHIPPED | OUTPATIENT
Start: 2025-06-19 | End: 2026-06-19

## 2025-06-19 ASSESSMENT — ENCOUNTER SYMPTOMS
DIZZINESS: 0
LIGHT-HEADEDNESS: 0
WEAKNESS: 0
ORTHOPNEA: 0
COUGH: 0
PALPITATIONS: 0
HEADACHES: 0
SHORTNESS OF BREATH: 0
CONSTITUTIONAL NEGATIVE: 1
GASTROINTESTINAL NEGATIVE: 1
DYSPNEA ON EXERTION: 0
FOCAL WEAKNESS: 0

## 2025-06-19 NOTE — PATIENT INSTRUCTIONS
Thank you for coming in today. If you have any questions or concerns, you may call the Heart Failure Office at [phone] option [phone], or [phone].  You may also contact our heart failure nursing team via email on [email or web address].    For quicker results set-up your  [employer] account to receive results and other correspondence directly to your phone.    Please bring all your pills/medications to your Cardiology appointments.    **    - Please make the following medication changes:  1.  RESTART Farxiga 5 mg once daily close (please STOP this medicine if you ever develop vomiting, diarrhea, respiratory infection)    3.  RESTART spironolactone 12.5 mg once daily    - Please have the following tests done:  1.Blood tests in 1 week (RFP, BNP, CBC, Mg, lipids)    2.  Blood tests in 4 weeks (RFP, BNP)    - Please make an appointment to be seen in 1  month

## 2025-06-19 NOTE — PROGRESS NOTES
"Referring Clinician:   Primary cardiologist: Dr. 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  Review of the electronic medical record shows a past medical history significant for hypertension, OA, hyperlipidemia, asthma, diabetes mellitus, obesity with BMI~45 kg/m² (maintained on tirzepatide). She presented to [facility] 3/2024 with dyspnea and was found to have elevated natruretic peptides, and depressed ejection fraction.  On TTE 4/2024 LVEF 30-35% LVIDD 5.7 cm.  There is a documented allergy to lisinopril (cough), she confirmed 9/27/2024 that she only had a cough with lisinopril.    Ms. Shankar was hospitalized 3/2025 with dyspnea, dizziness and thigh pain and symptoms of an upper respiratory tract infection.  She was found to have hyperkalemia, acute kidney injury and was dehydrated.  She was volume repleted.    TTE 3/2025 demonstrated improvement in LV systolic function with LVEF ~ 50%. Sacubitril valsartan, empagliflozin, and spironolactone were held at the time of discharge.  Lab testing done at [facility] 6/16/2025 demonstrated normalised renal function.    Per her 3/2025 discharge summary, \"..presented with sob, dizziness and thigh pain. Pt endorsing URI sx, n/v and vomitting have that now been improving but she developed sob and dizziness. She is also have b/l thigh pain when she exerts herself/with ambulation. Sx now present for 3 weeks. She feels these are medication side effects. In the Ed she was found to have low bp. Labs showed hyperkalemia, NAGMA, hypermagnesemia, and SCARLET. D-dimer 391 on admit. CXR negative on admit. K cocktail administered. Cr  peaked at 2.05, associated with metabolic acidosis. Pt given fluids. Echo showing recovered EF to 50%, normal and collapsible IVC. Cr downtrending, bp improved. RUFINA of lower extremity " "reviewed, no acute findings.     -BP seems more stable.   -SCARLET is improving   -Held  Entresto, Farxiga, and Aldactone until fu with PCP and card clinic   -Bicarb is back to normal\"     Symptomatically, she denies chest pain, no SOB at rest, mild SOBOE, PND a few nights a week, 1 pillow orthopnoea,  no further  chronic leg  swelling ( wears compresison stockings), no palpitations, no syncope, no pre syncope.    Functionally, her exercise capacity is described as baseline - can walk in [employer], can climb a flight of stairs and walk a city block.  Moreover, she swims twice a week for up to 3 hours without major exertional symptoms.  Her knees and hip pain limit her mobility mostly.    She is strictly adherent with all prescribed medications    Her last HF hospitalisation was 3/2025.    Review of Systems   Constitutional: Negative.   Cardiovascular:  Negative for dyspnea on exertion, leg swelling, orthopnea and palpitations.   Respiratory:  Negative for cough and shortness of breath.    Gastrointestinal: Negative.    Genitourinary: Negative.    Neurological:  Negative for dizziness, focal weakness, headaches, light-headedness and weakness.         Surgical Hx:  - C sections x 4    Past Obstetric Hx:  - G4  - No known cardiac complications of pregnancy    Social Hx:  - Smoking- quit 2021, prev smoked from age [age] years 1/2 PPD  - ETOH- never a heavy drinker, nil now  - Illicit drugs-Last crack ~ 1980  - Lives alone, grandson visits often. Feels safe at home      Family Hx:  Specifically, there is no family history of  CAD, heart failure, ICD, PPM, OHT, arrhythmias, CVA, or sudden cardiac death.    Sister-heart disease? Dx  Niece- \"2 blood clots in her heart and fluid to her hips\"  - Cousin- ? Heart pump  Brother--heart disease ? Dx     Medication reconciliation completed, see below.     Medication Documentation Review Audit       Reviewed by Tricia Duffy RN (Registered Nurse) on 06/19/25 at 1349  "     Medication Order Taking? Sig Documenting Provider Last Dose Status   acetaminophen (Tylenol) 325 mg tablet [id number] Yes Take 2 tablets (650 mg) by mouth every 6 hours if needed for mild pain (1 - 3), headaches or fever (temp greater than 38.0 C). Elina Ford MD  Active   atorvastatin (Lipitor) 80 mg tablet [id number] Yes Take 1 tablet (80 mg) by mouth once daily. Elina Ford MD  Active   blood pressure monitor (Blood Pressure Kit) kit [id number] Yes Take blood pressure 1-2 times a day Georgette Ortiz MD PhD  Active   blood sugar diagnostic strip [id number] Yes Use as instructed Historical MD Matty  Active   diclofenac sodium (Voltaren) 1 % gel [id number] Yes Apply 4.5 inches (4 g) topically. Historical Provider, MD  Active   lidocaine (lidocaine HCL) 4 % cream [id number] Yes Apply thin layer to affected area every 4 hours as needed for pain Historical Provider, MD  Active   melatonin 3 mg tablet [id number]  Take 2 tablets (6 mg) by mouth as needed at bedtime for sleep.   Patient not taking: Reported on 6/19/2025    Elina Ford MD  Active     Discontinued 03/04/24 2219   metoprolol succinate XL (Toprol-XL) 100 mg 24 hr tablet [id number] Yes Take 1 tablet (100 mg) by mouth once daily. Do not crush or chew. Georgette Ortiz MD PhD  Active   mirtazapine (Remeron) 15 mg tablet [id number]  Take 1 tablet (15 mg) by mouth once daily at bedtime.   Patient not taking: Reported on 6/19/2025    Historical Provider, MD  Active   Mounjaro 15 mg/0.5 mL pen injector [id number] Yes Inject 15 mg under the skin 1 (one) time per week. Historical Provider, MD  Active   OneTouch Delica Plus Lancet 33 gauge misc [id number] Yes  Historical Provider, MD  Active   OneTouch Ultra2 Meter misc [id number] Yes  Historical Provider, MD  Active   polyethylene glycol (Glycolax, Miralax) 17 gram packet [id number]  Take 17 g by mouth once daily. Historical Provider, MD  Active   topiramate (Topamax) 50 mg tablet [id number] Yes  Take 1 tablet (50 mg) by mouth 2 times a day. Historical Provider, MD  Active                      Allergies   Allergen Reactions    Lisinopril Cough        Investigations:    The electronic medical record has been reviewed by me for salient history. All cardiovascular imaging and testing available in the electronic medical record, and [employer] has been reviewed.    Visit Vitals  /78   Pulse 81   Wt 111 kg (244 lb)   SpO2 94%   BMI 44.63 kg/m²   Smoking Status Former   BSA 2.2 m²      On examination:    Very pleasant obese middle aged -American woman in no apparent CP or painful distress  Well  groomed   Neck: No JVD or HJR  CVS: HS 1,2.   No added sounds  Resp: CTA bilaterally. Percussion note resonant  Abdomen: Obese,SNT, BS wnl  Extremities: No pedal oedema  Skin: warm and dry  CNS: AO x 4, no gross deficits    Lab Results   Component Value Date    WBC 6.4 03/17/2025    HGB 10.8 (L) 03/17/2025    HCT 35.4 (L) 03/17/2025     03/17/2025    CHOL 127 10/11/2024    TRIG 67 10/11/2024    HDL 45.0 10/11/2024    ALT 18 03/14/2025    AST 16 03/14/2025     03/19/2025    K 4.5 03/19/2025     (H) 03/19/2025    CREATININE 1.75 (H) 03/19/2025    BUN 27 (H) 03/19/2025    CO2 21 03/19/2025    TSH 1.44 10/11/2024    HGBA1C 7.8 (H) 03/08/2025      Transthoracic Echo (TTE) Complete  Result Date: 3/15/2025   [facility], [address] [place] [address]                Tel [phone] and Fax [phone] TRANSTHORACIC ECHOCARDIOGRAM REPORT  Patient Name:       FLAVIA Freeman Physician:    [id number] Audra Haines MD Study Date:         3/15/2025           Ordering Provider:    [id number] SKINNY WYATT MRN/PID:            [id number]            Fellow: Accession#:         [id number]        Nurse:                Katelyn                                                                Parrish SOMMERS Date of Birth/Age:  1960 / 64      Sonographer:          Marco rascon RDCS Gender assigned at  F                   Additional Staff: Birth: Height:             157.48 cm           Admit Date:           3/14/2025 Weight:             108.86 kg           Admission Status:     Inpatient -                                                               Routine BSA / BMI:          2.07 m2 / 43.90     Encounter#:           [id number]                     kg/m2 Blood Pressure:     119/63 mmHg         Department Location:  [facility]                                                               Non Invasive Study Type:    TRANSTHORACIC ECHO (TTE) COMPLETE Diagnosis/ICD: Acute on chronic systolic (congestive) heart failure (CHF)-I50.23 Indication:    acute on chronic systolic heart failure CPT Code:      Echo Complete w Full Doppler-[id number] Patient History: Pertinent History: HTN; DM II; HLD; CAITLIN; SOB; dizziness; PVD. Study Detail: The following Echo studies were performed: 2D, M-Mode, Doppler and               color flow. Technically challenging study due to body habitus.               Definity used as a contrast agent for endocardial border               definition. Total contrast used for this procedure was 2.0 mL via               IV push.  PHYSICIAN INTERPRETATION: Left Ventricle: Left ventricular ejection fraction is mildly decreased, calculated by Ortez's biplane at 50%. There are no regional left ventricular wall motion abnormalities. The left ventricular cavity size is normal. There is normal septal and normal posterior left ventricular wall thickness. There is paradoxical septal wall motion. Left ventricular diastolic filling cannot be determined due to E/A wave fusion. There is no definite left ventricular thrombus visualized. Left Atrium: The left atrial size is normal. Right Ventricle: The right  ventricle is normal in size. There is normal right ventricular global systolic function. Right Atrium: The right atrium is normal in size. Aortic Valve: The aortic valve is trileaflet. There is no evidence of aortic valve regurgitation. The peak instantaneous gradient of the aortic valve is 7 mmHg. Mitral Valve: The mitral valve is normal in structure. There is trace mitral valve regurgitation. Tricuspid Valve: The tricuspid valve is structurally normal. There is trace tricuspid regurgitation. The right ventricular systolic pressure is unable to be estimated. Pulmonic Valve: The pulmonic valve is structurally normal. There is physiologic pulmonic valve regurgitation. Pericardium: Trivial pericardial effusion. Aorta: The aortic root is normal. Systemic Veins: The inferior vena cava appears normal in size, with IVC inspiratory collapse greater than 50%. In comparison to the previous echocardiogram(s): Compared with the prior exam from 4/30/2024, there has been an improvement in the LV systolic function with prior LVEF moderately reduced at 30-35% and now mildly reduced at 50%. The LV is now normal in size wherreas it was mildy reduced previously.  CONCLUSIONS:  1. Left ventricular ejection fraction is mildly decreased, calculated by Ortez's biplane at 50%.  2. No left ventricular thrombus visualized.  3. There is normal right ventricular global systolic function.  4. Compared with the prior exam from 4/30/2024, there has been an improvement in the LV systolic function with prior LVEF moderately reduced at 30-35% and now mildly reduced at 50%. The LV is now normal in size wherreas it was mildy reduced previously. QUANTITATIVE DATA SUMMARY:  2D MEASUREMENTS:          Normal Ranges: Ao Root d:       3.20 cm  (2.0-3.7cm) LAs:             2.80 cm  (2.7-4.0cm) IVSd:            0.70 cm  (0.6-1.1cm) LVPWd:           0.60 cm  (0.6-1.1cm) LVIDd:           5.10 cm  (3.9-5.9cm) LVIDs:           4.00 cm LV Mass Index:   52 g/m2  LVEDV Index:     49 ml/m2 LV % FS          21.6 %  LEFT ATRIUM:                  Normal Ranges: LA Vol A4C:        50.5 ml    (22+/-6mL/m2) LA Vol A2C:        49.6 ml LA Vol BP:         51.4 ml LA Vol Index A4C:  24.5ml/m2 LA Vol Index A2C:  24.0 ml/m2 LA Vol Index BP:   24.9 ml/m2 LA Area A4C:       17.0 cm2 LA Area A2C:       16.4 cm2 LA Major Axis A4C: 4.9 cm LA Major Axis A2C: 4.6 cm LA Volume Index:   24.8 ml/m2  RIGHT ATRIUM:          Normal Ranges: RA Area A4C:  14.0 cm2  LV SYSTOLIC FUNCTION:                      Normal Ranges: EF-A4C View:    53 % (>=55%) EF-A2C View:    51 % EF-Biplane:     50 % LV EF Reported: 50 %  LV DIASTOLIC FUNCTION:            Normal Ranges: MV Peak E:             0.63 m/s   (0.7-1.2 m/s) MV Peak A:             0.90 m/s   (0.42-0.7 m/s) E/A Ratio:             0.70       (1.0-2.2) MV e'                  0.089 m/s  (>8.0) MV lateral e'          0.08 m/s MV medial e'           0.10 m/s E/e' Ratio:            7.06       (<8.0) PulmV Sys Alejandro:         66.80 cm/s PulmV Harrison Alejandro:        81.00 cm/s PulmV S/D Alejandro:         0.80 PulmV A Revs Alejandro:      51.80 cm/s  AORTIC VALVE:           Normal Ranges: AoV Vmax:      1.36 m/s (<=1.7m/s) AoV Peak P.4 mmHg (<20mmHg) LVOT Max Alejandro:  1.01 m/s (<=1.1m/s) LVOT VTI:      16.30 cm LVOT Diameter: 2.20 cm  (1.8-2.4cm) AoV Area,Vmax: 2.82 cm2 (2.5-4.5cm2)  RIGHT VENTRICLE: RV Basal 3.14 cm RV Mid   2.15 cm RV Major 6.3 cm TAPSE:   20.0 mm RV s'    0.18 m/s  TRICUSPID VALVE/RVSP:         Normal Ranges: IVC Diam:             1.60 cm  PULMONIC VALVE:          Normal Ranges: PV Max Alejandro:     0.9 m/s  (0.6-0.9m/s) PV Max PG:      3.4 mmHg  PULMONARY VEINS: PulmV A Revs Alejandro: 51.80 cm/s PulmV Harrison Alejandro:   81.00 cm/s PulmV S/D Alejandro:    0.80 PulmV Sys Alejandro:    66.80 cm/s  [id number] Audra Haines MD Electronically signed on 3/15/2025 at 12:33:08 PM  ** Final **     IMPRESSION:    64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  Review of the electronic medical record shows a past medical history significant for hypertension, OA, hyperlipidemia, asthma, diabetes mellitus, obesity with BMI~45 kg/m² (maintained on tirzepatide). She presented to [facility] 3/2024 with dyspnea and was found to have elevated natruretic peptides, and depressed ejection fraction.  On TTE 4/2024 LVEF 30-35% LVIDD 5.7 cm.  There is a documented allergy to lisinopril (cough), she confirmed 9/27/2024 that she only had a cough with lisinopril.    Ms. Shankar was hospitalized 3/2025 with dyspnea, dizziness and thigh pain and symptoms of an upper respiratory tract infection.  She was found to have hyperkalemia, acute kidney injury and was dehydrated.  She was volume repleted.    TTE 3/2025 demonstrated improvement in LV systolic function with LVEF ~ 50%. Sacubitril valsartan, empagliflozin, and spironolactone were held at the time of discharge.    NYHA Functional Class: 2  ACC/AHA Stage C heart failure  Volume status: Euvolemic  Perfusion status: Warm to touch  Aetiology: TBD    PLAN:  #[id number]  -Cardiac MRI previously declined by her insurance company  - No current indication for device therapy  -Will slowly reintroduce cardiac medications.  Starting dapagliflozin 5 mg once daily, and spironolactone 12.5 mg once daily today with close renal function monitoring  -Plan to start sacubitril/valsartan at next visit if other meds are tolerated  -Continue metoprolol succinate  -Heart failure lifestyle modifications discussed and Qs answered.     -Medication optimisation:  BB:  Continue metoprolol succinate 100 mg once daily  RAASi: Continue to hold ARNI, was previously on 97/103 mg twice daily  AA: Resume spironolactone 12 5 mg once daily  SGLT2i: Resume generic dapagliflozin at halved  dose of 5 mg once daily  Continue to hold triamterene-hydrochlorothiazide     COUNSELING:   We discussed the following non-pharmacological measures  during this visit:  ·Smoking and alcohol abstinence/cessation, if applicable.  ·Dietary and medication compliance (in particular, salt restriction)  ·Monitoring daily weights and blood pressures  ·Exercise regimen (walking)    Heart Failure Education Booklet given: 9/27/2024    Follow-up appointment in 4 weeks, renal function panel check in 1 week & 4 weeks    This note was transcribed using the Dragon Dictation system. There may be grammatical, punctuation, or verbiage errors that can occur with voice recognition programs.    Georgette Ortiz MD PhD

## 2025-06-26 DIAGNOSIS — E78.5 DYSLIPIDEMIA: ICD-10-CM

## 2025-06-26 DIAGNOSIS — I50.20 HEART FAILURE WITH REDUCED EJECTION FRACTION: ICD-10-CM

## 2025-07-19 DIAGNOSIS — I50.20 HEART FAILURE WITH REDUCED EJECTION FRACTION: ICD-10-CM

## 2025-08-19 LAB
ALBUMIN SERPL-MCNC: 4.3 G/DL (ref 3.6–5.1)
BNP SERPL-MCNC: 93 PG/ML
BUN SERPL-MCNC: 22 MG/DL (ref 7–25)
BUN/CREAT SERPL: 14 (CALC) (ref 6–22)
CALCIUM SERPL-MCNC: 9.7 MG/DL (ref 8.6–10.4)
CHLORIDE SERPL-SCNC: 111 MMOL/L (ref 98–110)
CO2 SERPL-SCNC: 22 MMOL/L (ref 20–32)
CREAT SERPL-MCNC: 1.57 MG/DL (ref 0.5–1.05)
EGFRCR SERPLBLD CKD-EPI 2021: 37 ML/MIN/1.73M2
GLUCOSE SERPL-MCNC: 133 MG/DL (ref 65–99)
PHOSPHATE SERPL-MCNC: 5.3 MG/DL (ref 2.5–4.5)
POTASSIUM SERPL-SCNC: 4.8 MMOL/L (ref 3.5–5.3)
SODIUM SERPL-SCNC: 142 MMOL/L (ref 135–146)